# Patient Record
Sex: FEMALE | Race: WHITE | NOT HISPANIC OR LATINO | Employment: FULL TIME | URBAN - METROPOLITAN AREA
[De-identification: names, ages, dates, MRNs, and addresses within clinical notes are randomized per-mention and may not be internally consistent; named-entity substitution may affect disease eponyms.]

---

## 2017-03-15 ENCOUNTER — TRANSCRIBE ORDERS (OUTPATIENT)
Dept: ADMINISTRATIVE | Facility: HOSPITAL | Age: 67
End: 2017-03-15

## 2017-03-15 DIAGNOSIS — R92.8 ABNORMAL MAMMOGRAM: Primary | ICD-10-CM

## 2017-03-22 ENCOUNTER — APPOINTMENT (OUTPATIENT)
Dept: MAMMOGRAPHY | Facility: HOSPITAL | Age: 67
End: 2017-03-22

## 2017-03-23 ENCOUNTER — APPOINTMENT (OUTPATIENT)
Dept: MAMMOGRAPHY | Facility: HOSPITAL | Age: 67
End: 2017-03-23

## 2017-03-31 ENCOUNTER — APPOINTMENT (OUTPATIENT)
Dept: MAMMOGRAPHY | Facility: HOSPITAL | Age: 67
End: 2017-03-31

## 2023-09-12 ENCOUNTER — TELEPHONE (OUTPATIENT)
Dept: GYNECOLOGIC ONCOLOGY | Facility: CLINIC | Age: 73
End: 2023-09-12
Payer: MEDICARE

## 2023-09-12 NOTE — TELEPHONE ENCOUNTER
Caller: Cheryl Storm    Relationship: Self    Best call back number: [phone]    What is the best time to reach you: ASAP    Who are you requesting to speak with (clinical staff, provider,  specific staff member): SCHEDULING     What was the call regarding: PT REQUESTING A CALL BACK TO SEE IF SHE CAN BE ADDED TO DR ALAN'S SURGERY SCHEDULE, SO THINGS WILL BE EXPEDITED AFTER HER VISIT ON 9-29, PLEASE CALL TO ADVISE    Is it okay if the provider responds through MyChart: YES

## 2023-09-29 ENCOUNTER — LAB (OUTPATIENT)
Dept: LAB | Facility: HOSPITAL | Age: 73
End: 2023-09-29
Payer: MEDICARE

## 2023-09-29 ENCOUNTER — TELEPHONE (OUTPATIENT)
Dept: GYNECOLOGIC ONCOLOGY | Facility: CLINIC | Age: 73
End: 2023-09-29

## 2023-09-29 ENCOUNTER — PREP FOR SURGERY (OUTPATIENT)
Dept: OTHER | Facility: HOSPITAL | Age: 73
End: 2023-09-29
Payer: MEDICARE

## 2023-09-29 ENCOUNTER — OFFICE VISIT (OUTPATIENT)
Dept: GYNECOLOGIC ONCOLOGY | Facility: CLINIC | Age: 73
End: 2023-09-29
Payer: MEDICARE

## 2023-09-29 VITALS
HEART RATE: 63 BPM | OXYGEN SATURATION: 98 % | RESPIRATION RATE: 17 BRPM | HEIGHT: 64 IN | WEIGHT: 124 LBS | BODY MASS INDEX: 21.17 KG/M2 | SYSTOLIC BLOOD PRESSURE: 123 MMHG | TEMPERATURE: 97.3 F | DIASTOLIC BLOOD PRESSURE: 75 MMHG

## 2023-09-29 DIAGNOSIS — C54.1 ENDOMETRIAL CANCER: Primary | ICD-10-CM

## 2023-09-29 DIAGNOSIS — Z80.0 FAMILY HISTORY OF PANCREATIC CANCER: ICD-10-CM

## 2023-09-29 DIAGNOSIS — C54.1 ENDOMETRIAL CANCER: ICD-10-CM

## 2023-09-29 LAB
ALBUMIN SERPL-MCNC: 4.5 G/DL (ref 3.5–5.2)
ALBUMIN/GLOB SERPL: 1.7 G/DL
ALP SERPL-CCNC: 71 U/L (ref 39–117)
ALT SERPL W P-5'-P-CCNC: 18 U/L (ref 1–33)
ANION GAP SERPL CALCULATED.3IONS-SCNC: 7 MMOL/L (ref 5–15)
AST SERPL-CCNC: 25 U/L (ref 1–32)
BASOPHILS # BLD AUTO: 0.02 10*3/MM3 (ref 0–0.2)
BASOPHILS NFR BLD AUTO: 0.4 % (ref 0–1.5)
BILIRUB SERPL-MCNC: 0.3 MG/DL (ref 0–1.2)
BUN SERPL-MCNC: 17 MG/DL (ref 8–23)
BUN/CREAT SERPL: 26.2 (ref 7–25)
CALCIUM SPEC-SCNC: 9.6 MG/DL (ref 8.6–10.5)
CANCER AG125 SERPL QL: 116 U/ML (ref 0–38.1)
CHLORIDE SERPL-SCNC: 102 MMOL/L (ref 98–107)
CO2 SERPL-SCNC: 29 MMOL/L (ref 22–29)
CREAT SERPL-MCNC: 0.65 MG/DL (ref 0.57–1)
DEPRECATED RDW RBC AUTO: 48.4 FL (ref 37–54)
EGFRCR SERPLBLD CKD-EPI 2021: 93.7 ML/MIN/1.73
EOSINOPHIL # BLD AUTO: 0.1 10*3/MM3 (ref 0–0.4)
EOSINOPHIL NFR BLD AUTO: 1.9 % (ref 0.3–6.2)
ERYTHROCYTE [DISTWIDTH] IN BLOOD BY AUTOMATED COUNT: 13.7 % (ref 12.3–15.4)
GLOBULIN UR ELPH-MCNC: 2.6 GM/DL
GLUCOSE SERPL-MCNC: 105 MG/DL (ref 65–99)
HCT VFR BLD AUTO: 40.9 % (ref 34–46.6)
HGB BLD-MCNC: 13.6 G/DL (ref 12–15.9)
IMM GRANULOCYTES # BLD AUTO: 0.01 10*3/MM3 (ref 0–0.05)
IMM GRANULOCYTES NFR BLD AUTO: 0.2 % (ref 0–0.5)
LYMPHOCYTES # BLD AUTO: 1.41 10*3/MM3 (ref 0.7–3.1)
LYMPHOCYTES NFR BLD AUTO: 27.2 % (ref 19.6–45.3)
MCH RBC QN AUTO: 31.2 PG (ref 26.6–33)
MCHC RBC AUTO-ENTMCNC: 33.3 G/DL (ref 31.5–35.7)
MCV RBC AUTO: 93.8 FL (ref 79–97)
MONOCYTES # BLD AUTO: 0.52 10*3/MM3 (ref 0.1–0.9)
MONOCYTES NFR BLD AUTO: 10 % (ref 5–12)
NEUTROPHILS NFR BLD AUTO: 3.12 10*3/MM3 (ref 1.7–7)
NEUTROPHILS NFR BLD AUTO: 60.3 % (ref 42.7–76)
PLATELET # BLD AUTO: 273 10*3/MM3 (ref 140–450)
PMV BLD AUTO: 9.5 FL (ref 6–12)
POTASSIUM SERPL-SCNC: 4.8 MMOL/L (ref 3.5–5.2)
PROT SERPL-MCNC: 7.1 G/DL (ref 6–8.5)
RBC # BLD AUTO: 4.36 10*6/MM3 (ref 3.77–5.28)
SODIUM SERPL-SCNC: 138 MMOL/L (ref 136–145)
WBC NRBC COR # BLD: 5.18 10*3/MM3 (ref 3.4–10.8)

## 2023-09-29 PROCEDURE — 80053 COMPREHEN METABOLIC PANEL: CPT

## 2023-09-29 PROCEDURE — 85025 COMPLETE CBC W/AUTO DIFF WBC: CPT

## 2023-09-29 PROCEDURE — 36415 COLL VENOUS BLD VENIPUNCTURE: CPT

## 2023-09-29 PROCEDURE — 86304 IMMUNOASSAY TUMOR CA 125: CPT

## 2023-09-29 RX ORDER — PREGABALIN 150 MG/1
150 CAPSULE ORAL ONCE
OUTPATIENT
Start: 2023-09-29 | End: 2023-09-29

## 2023-09-29 RX ORDER — CEFAZOLIN SODIUM 2 G/100ML
2000 INJECTION, SOLUTION INTRAVENOUS ONCE
OUTPATIENT
Start: 2023-09-29 | End: 2023-09-29

## 2023-09-29 RX ORDER — HEPARIN SODIUM 5000 [USP'U]/ML
5000 INJECTION, SOLUTION INTRAVENOUS; SUBCUTANEOUS ONCE
OUTPATIENT
Start: 2023-09-29 | End: 2023-09-29

## 2023-09-29 RX ORDER — LATANOPROST 50 UG/ML
1 SOLUTION/ DROPS OPHTHALMIC
COMMUNITY
Start: 2023-06-21

## 2023-09-29 RX ORDER — CELECOXIB 200 MG/1
200 CAPSULE ORAL ONCE
OUTPATIENT
Start: 2023-09-29 | End: 2023-09-29

## 2023-09-29 RX ORDER — ACETAMINOPHEN 500 MG
1000 TABLET ORAL ONCE
OUTPATIENT
Start: 2023-09-29 | End: 2023-09-29

## 2023-09-29 RX ORDER — SODIUM CHLORIDE 9 MG/ML
40 INJECTION, SOLUTION INTRAVENOUS AS NEEDED
OUTPATIENT
Start: 2023-09-29

## 2023-09-29 RX ORDER — SCOLOPAMINE TRANSDERMAL SYSTEM 1 MG/1
1 PATCH, EXTENDED RELEASE TRANSDERMAL CONTINUOUS
OUTPATIENT
Start: 2023-09-29 | End: 2023-10-02

## 2023-09-29 RX ORDER — SODIUM CHLORIDE 0.9 % (FLUSH) 0.9 %
10 SYRINGE (ML) INJECTION EVERY 12 HOURS SCHEDULED
OUTPATIENT
Start: 2023-09-29

## 2023-09-29 RX ORDER — SODIUM CHLORIDE 0.9 % (FLUSH) 0.9 %
10 SYRINGE (ML) INJECTION AS NEEDED
OUTPATIENT
Start: 2023-09-29

## 2023-09-29 RX ORDER — SIMVASTATIN 10 MG
TABLET ORAL
COMMUNITY
Start: 2023-09-27

## 2023-09-29 RX ORDER — ESCITALOPRAM OXALATE 20 MG/1
TABLET ORAL
COMMUNITY
Start: 2023-09-05

## 2023-09-29 RX ORDER — VALACYCLOVIR HYDROCHLORIDE 500 MG/1
TABLET, FILM COATED ORAL
COMMUNITY
Start: 2023-09-05

## 2023-09-29 RX ORDER — DORZOLAMIDE HYDROCHLORIDE AND TIMOLOL MALEATE 20; 5 MG/ML; MG/ML
SOLUTION/ DROPS OPHTHALMIC
COMMUNITY
Start: 2023-09-08

## 2023-09-29 NOTE — PROGRESS NOTES
Cheryl Storm  [id number]  1950      Reason for visit: Newly diagnosed serous endometrial cancer    Consultation:  Patient is being seen at the request of Dr. Felicia Botello    History of present illness:  The patient is a 73 y.o. year old female who presents today for treatment and evaluation of the above issues.    Patient was seen 2023 with complaints of postmenopausal bleeding.  She underwent transvaginal ultrasound which showed a thickened endometrial stripe.  There is no free fluid and ovaries were not visualized.  She underwent endometrial biopsy which showed a serous endometrial cancer.  MSI testing was stable, but hormone receptor and HER2/miki testing was not performed.    She presents today for treatment discussion.    Today, patient notes she had vaginal bleeding for a few months, but delayed evaluation due to insurance issues related to her divorce and loss of medical insurance.  She is somewhat anxious and tearful today.  She notes a family history of pancreatic cancer in a father and brother.  She has questions about next steps.  For new patients, [facility] intake form from today was reviewed and confirmed.    OBGYN History:  She is a .  She does not use HRT. She does not have a history of abnormal pap smears, last Pap smear .    Oncologic History:  Oncology/Hematology History    No history exists.         Past Medical History:   Diagnosis Date    Endometrial cancer 2023       History reviewed. No pertinent surgical history.    MEDICATIONS:    Current Outpatient Medications:     dorzolamide-timolol (COSOPT) 22.3-6.8 MG/ML ophthalmic solution, , Disp: , Rfl:     escitalopram (LEXAPRO) 20 MG tablet, , Disp: , Rfl:     latanoprost (XALATAN) 0.005 % ophthalmic solution, Administer 1 drop to both eyes every night at bedtime., Disp: , Rfl:     simvastatin (ZOCOR) 10 MG tablet, , Disp: , Rfl:     timolol (BETIMOL) 0.25 % ophthalmic solution, Apply 1-2 drops to eye(s) as directed by provider  "Daily., Disp: , Rfl:     valACYclovir (VALTREX) 500 MG tablet, , Disp: , Rfl:      Allergies:  has No Known Allergies.    Social History:   Social History     Socioeconomic History    Marital status:        Family History:    Family History   Problem Relation Age of Onset    Breast cancer Neg Hx         no family hx       Health Maintenance:    Health Maintenance   Topic Date Due    DXA SCAN  Never done    COLORECTAL CANCER SCREENING  Never done    TDAP/TD VACCINES (1 - Tdap) Never done    Pneumococcal Vaccine 65+ (1 - PCV) Never done    MAMMOGRAM  08/31/2018    ZOSTER VACCINE (2 of 2) 02/20/2021    INFLUENZA VACCINE  08/01/2023    COVID-19 Vaccine (3 - 2023-24 season) 09/01/2023    HEPATITIS C SCREENING  Never done    ANNUAL WELLNESS VISIT  Never done       Review of Systems:  Please refer to history of present illness.  Review of systems otherwise negative.    Physical Exam:  Vitals:    09/29/23 1000   Resp: 17   Weight: 56.2 kg (124 lb)   Height: 162.6 cm (64\")   PainSc: 0-No pain       Body mass index is 21.28 kg/m².  Wt Readings from Last 3 Encounters:   09/29/23 56.2 kg (124 lb)     GENERAL: Alert, well-appearing female appearing her stated age who is in no apparent distress.   HEENT: Sclera anicteric. Head normocephalic, atraumatic. Mucus membranes moist.   NECK: Trachea midline, supple, without masses.  No thyromegaly.   BREASTS: Deferred  CARDIOVASCULAR: Normal rate, regular rhythm, no murmurs, rubs, or gallops.  No peripheral edema.  RESPIRATORY: Clear to auscultation bilaterally, normal respiratory effort  BACK:  No CVA tenderness, no vertebral tenderness on palpation  GASTROINTESTINAL:  Abdomen is soft, non-tender, non-distended, no rebound or guarding, no masses, or hernias. No HSM.   SKIN:  Warm, dry, well-perfused.  All visible areas intact.  No rashes, lesions, ulcers.  PSYCHIATRIC: AO x3, with appropriate affect, normal thought processes.  NEUROLOGIC: No focal deficits.  Moves extremities " well.  MUSCULOSKELETAL: Normal gait and station.   EXTREMITIES:   No cyanosis, clubbing, symmetric.  LYMPHATICS:  No cervical or inguinal adenopathy noted.     PELVIC exam:    External genitalia are free from lesion. On speculum examination, the cervix was free from lesion. On bimanual examination no mass was appreciated.  Uterus was normal in size and shape. There is no cervical motion or uterine tenderness. No cervical mass was palpated. Parametria were smooth. Rectovaginal exam was deferred.     ECOG PS 0    PROCEDURES: None    Diagnostic Data:    No Images in the past 120 days found..    No results found for: WBC, HGB, HCT, MCV, PLT, NEUTROABS, GLUCOSE, BUN, CREATININE, EGFRIFNONA, EGFRIFAFRI, NA, K, CL, CO2, MG, PHOS, CALCIUM, ALBUMIN, AST, ALT, BILITOT  No results found for:       Assessment & Plan   This is a 73 y.o. woman with newly diagnosed serous endometrial cancer on biopsy.  Encounter Diagnosis   Name Primary?    Endometrial cancer Yes     This high risk histology cancer was discussed.  Imaging and  were ordered.  We discussed surgery as a first step toward treatment although patient was advised of the high likelihood that she would need additional treatment.    Patient was consented for injection of ICG dye, robotic assisted total laparoscopic hysterectomy, bilateral salpingo-oophorectomy, sentinel/completion lymph node dissection, omentectomy.      Risks and benefits of surgery were discussed.  This included, but was not limited to, infection and bleeding like when the skin is cut; damage to surrounding structures; and incisional complications.  Risk of DVT was addressed for major surgeries.  Standard of care efforts to minimize these risks were reviewed.  Typical hospital stay and recovery were discussed as well as post-procedure precautions.  Surgical implications of chronic illnesses on recovery and surgical outcome were reviewed.   Pain medication regimen for postoperative care was  discussed.  Typical regimen and avoidance of narcotics was discussed.  Patient was educated that other factors, such as existing narcotic use, can impact postoperative pain management.    Risks and benefits of lymph node dissection were further discussed.  This included lymphocyst, hematoma, lymphedema, vascular injury, and nerve injury.  Patient verbalized understanding of the plan including the risks and benefits.  Appropriate perioperative testing including laboratory evaluation, EKG as clinically indicated, chest x-ray as clinically indicated, and preadmission evaluation were all ordered as a part of this patient's care.    Due to patient's family history of pancreatic cancer and personal history of cancer, referral was made to genetic counseling.  This is in keeping with guidelines.  Ajay testing was ordered.    Pain assessment was performed today as a part of patient’s care.  For patients with pain related to surgery, gynecologic malignancy or cancer treatment, the plan is as noted in the assessment/plan.  For patients with pain not related to these issues, they are to seek any further needed care from a more appropriate provider, such as PCP.      Orders Placed This Encounter   Procedures    CT Chest With Contrast     Standing Status:   Future     Standing Expiration Date:   9/29/2024     Order Specific Question:   Release to patient     Answer:   Routine Release [[id number]]    CT Abdomen Pelvis With Contrast     Standing Status:   Future     Standing Expiration Date:   9/28/2024     Order Specific Question:   Will Oral Contrast be needed for this procedure?     Answer:   Yes    SCANNED - LABS         Standing Status:   Future     Standing Expiration Date:   9/29/2024     Order Specific Question:   Release to patient     Answer:   Routine Release [[id number]]       FOLLOW UP: surgery    I spent 60 minutes caring for Cheryl on this date of service. This time includes time spent by me in the following  activities: preparing for the visit, reviewing tests, performing a medically appropriate examination and/or evaluation, counseling and educating the patient/family/caregiver, ordering medications, tests, or procedures, referring and communicating with other health care professionals, documenting information in the medical record, and care coordination    Electronically Signed by: Latasha Farr MD  Date: 9/29/2023

## 2023-09-29 NOTE — H&P (VIEW-ONLY)
Cheryl Storm  [id number]  1950      Reason for visit: Newly diagnosed serous endometrial cancer    Consultation:  Patient is being seen at the request of Dr. Felicia Botello    History of present illness:  The patient is a 73 y.o. year old female who presents today for treatment and evaluation of the above issues.    Patient was seen 2023 with complaints of postmenopausal bleeding.  She underwent transvaginal ultrasound which showed a thickened endometrial stripe.  There is no free fluid and ovaries were not visualized.  She underwent endometrial biopsy which showed a serous endometrial cancer.  MSI testing was stable, but hormone receptor and HER2/miki testing was not performed.    She presents today for treatment discussion.    Today, patient notes she had vaginal bleeding for a few months, but delayed evaluation due to insurance issues related to her divorce and loss of medical insurance.  She is somewhat anxious and tearful today.  She notes a family history of pancreatic cancer in a father and brother.  She has questions about next steps.  For new patients, [facility] intake form from today was reviewed and confirmed.    OBGYN History:  She is a .  She does not use HRT. She does not have a history of abnormal pap smears, last Pap smear .    Oncologic History:  Oncology/Hematology History    No history exists.         Past Medical History:   Diagnosis Date    Endometrial cancer 2023       History reviewed. No pertinent surgical history.    MEDICATIONS:    Current Outpatient Medications:     dorzolamide-timolol (COSOPT) 22.3-6.8 MG/ML ophthalmic solution, , Disp: , Rfl:     escitalopram (LEXAPRO) 20 MG tablet, , Disp: , Rfl:     latanoprost (XALATAN) 0.005 % ophthalmic solution, Administer 1 drop to both eyes every night at bedtime., Disp: , Rfl:     simvastatin (ZOCOR) 10 MG tablet, , Disp: , Rfl:     timolol (BETIMOL) 0.25 % ophthalmic solution, Apply 1-2 drops to eye(s) as directed by provider  "Daily., Disp: , Rfl:     valACYclovir (VALTREX) 500 MG tablet, , Disp: , Rfl:      Allergies:  has No Known Allergies.    Social History:   Social History     Socioeconomic History    Marital status:        Family History:    Family History   Problem Relation Age of Onset    Breast cancer Neg Hx         no family hx       Health Maintenance:    Health Maintenance   Topic Date Due    DXA SCAN  Never done    COLORECTAL CANCER SCREENING  Never done    TDAP/TD VACCINES (1 - Tdap) Never done    Pneumococcal Vaccine 65+ (1 - PCV) Never done    MAMMOGRAM  08/31/2018    ZOSTER VACCINE (2 of 2) 02/20/2021    INFLUENZA VACCINE  08/01/2023    COVID-19 Vaccine (3 - 2023-24 season) 09/01/2023    HEPATITIS C SCREENING  Never done    ANNUAL WELLNESS VISIT  Never done       Review of Systems:  Please refer to history of present illness.  Review of systems otherwise negative.    Physical Exam:  Vitals:    09/29/23 1000   Resp: 17   Weight: 56.2 kg (124 lb)   Height: 162.6 cm (64\")   PainSc: 0-No pain       Body mass index is 21.28 kg/m².  Wt Readings from Last 3 Encounters:   09/29/23 56.2 kg (124 lb)     GENERAL: Alert, well-appearing female appearing her stated age who is in no apparent distress.   HEENT: Sclera anicteric. Head normocephalic, atraumatic. Mucus membranes moist.   NECK: Trachea midline, supple, without masses.  No thyromegaly.   BREASTS: Deferred  CARDIOVASCULAR: Normal rate, regular rhythm, no murmurs, rubs, or gallops.  No peripheral edema.  RESPIRATORY: Clear to auscultation bilaterally, normal respiratory effort  BACK:  No CVA tenderness, no vertebral tenderness on palpation  GASTROINTESTINAL:  Abdomen is soft, non-tender, non-distended, no rebound or guarding, no masses, or hernias. No HSM.   SKIN:  Warm, dry, well-perfused.  All visible areas intact.  No rashes, lesions, ulcers.  PSYCHIATRIC: AO x3, with appropriate affect, normal thought processes.  NEUROLOGIC: No focal deficits.  Moves extremities " well.  MUSCULOSKELETAL: Normal gait and station.   EXTREMITIES:   No cyanosis, clubbing, symmetric.  LYMPHATICS:  No cervical or inguinal adenopathy noted.     PELVIC exam:    External genitalia are free from lesion. On speculum examination, the cervix was free from lesion. On bimanual examination no mass was appreciated.  Uterus was normal in size and shape. There is no cervical motion or uterine tenderness. No cervical mass was palpated. Parametria were smooth. Rectovaginal exam was deferred.     ECOG PS 0    PROCEDURES: None    Diagnostic Data:    No Images in the past 120 days found..    No results found for: WBC, HGB, HCT, MCV, PLT, NEUTROABS, GLUCOSE, BUN, CREATININE, EGFRIFNONA, EGFRIFAFRI, NA, K, CL, CO2, MG, PHOS, CALCIUM, ALBUMIN, AST, ALT, BILITOT  No results found for:       Assessment & Plan   This is a 73 y.o. woman with newly diagnosed serous endometrial cancer on biopsy.  Encounter Diagnosis   Name Primary?    Endometrial cancer Yes     This high risk histology cancer was discussed.  Imaging and  were ordered.  We discussed surgery as a first step toward treatment although patient was advised of the high likelihood that she would need additional treatment.    Patient was consented for injection of ICG dye, robotic assisted total laparoscopic hysterectomy, bilateral salpingo-oophorectomy, sentinel/completion lymph node dissection, omentectomy.      Risks and benefits of surgery were discussed.  This included, but was not limited to, infection and bleeding like when the skin is cut; damage to surrounding structures; and incisional complications.  Risk of DVT was addressed for major surgeries.  Standard of care efforts to minimize these risks were reviewed.  Typical hospital stay and recovery were discussed as well as post-procedure precautions.  Surgical implications of chronic illnesses on recovery and surgical outcome were reviewed.   Pain medication regimen for postoperative care was  discussed.  Typical regimen and avoidance of narcotics was discussed.  Patient was educated that other factors, such as existing narcotic use, can impact postoperative pain management.    Risks and benefits of lymph node dissection were further discussed.  This included lymphocyst, hematoma, lymphedema, vascular injury, and nerve injury.  Patient verbalized understanding of the plan including the risks and benefits.  Appropriate perioperative testing including laboratory evaluation, EKG as clinically indicated, chest x-ray as clinically indicated, and preadmission evaluation were all ordered as a part of this patient's care.    Due to patient's family history of pancreatic cancer and personal history of cancer, referral was made to genetic counseling.  This is in keeping with guidelines.  Ajay testing was ordered.    Pain assessment was performed today as a part of patient’s care.  For patients with pain related to surgery, gynecologic malignancy or cancer treatment, the plan is as noted in the assessment/plan.  For patients with pain not related to these issues, they are to seek any further needed care from a more appropriate provider, such as PCP.      Orders Placed This Encounter   Procedures    CT Chest With Contrast     Standing Status:   Future     Standing Expiration Date:   9/29/2024     Order Specific Question:   Release to patient     Answer:   Routine Release [[id number]]    CT Abdomen Pelvis With Contrast     Standing Status:   Future     Standing Expiration Date:   9/28/2024     Order Specific Question:   Will Oral Contrast be needed for this procedure?     Answer:   Yes    SCANNED - LABS         Standing Status:   Future     Standing Expiration Date:   9/29/2024     Order Specific Question:   Release to patient     Answer:   Routine Release [[id number]]       FOLLOW UP: surgery    I spent 60 minutes caring for Cheryl on this date of service. This time includes time spent by me in the following  activities: preparing for the visit, reviewing tests, performing a medically appropriate examination and/or evaluation, counseling and educating the patient/family/caregiver, ordering medications, tests, or procedures, referring and communicating with other health care professionals, documenting information in the medical record, and care coordination    Electronically Signed by: Latasha Farr MD  Date: 9/29/2023

## 2023-09-29 NOTE — PATIENT INSTRUCTIONS
"Surgery Instructions            Cheryl Storm  [id number]  1950      SURGEON:  Latasha Farr MD    Surgery Coordinator:  CHANA CASTREJON    If you have any questions before or after surgery please call.  [phone]        Appointment        1.  Your surgery has been scheduled on 10/16/2023 .  You will need to go to Main Registration to check in at 7:00 AM . They will then send up to the [address] [place] [address] second floor surgery area.    The Day(s) Before Surgery      Nothing by mouth after midnight on 10/15/2023.    Plan to have someone take you home from the hospital.    If you are taking any weight loss medications please let our staff now. Ideally they will need to be held 2 weeks prior to your procedure.    Do not use any products that contain nicotine or tobacco, such as cigarettes and e-cigarettes. These can delay healing after surgery. If you need help quitting, ask your health care provider       Do not take vitamins or aspirin one week before surgery ( if applicable). Do not take Ibuprofen or NSAIDs 5 days prior to Surgery. Do not take ACE or ARB medications for blood pressure the morning of surgery. If you are taking insulin, take 1/2 dose insulin the night prior to surgery.   Bring them with you to the hospital (Diabetic patient should bring insulin if instructed by the managing physician). If you are taking a blood thinner ( Eliquis, Coumadin, Xarelto, Lovenox, Asprin, Heparin, etc.) please have the provider that manages this instruct you on when to stop taking prior to surgery.     Continue antidepressants, Beta Blockers \"olol\", anti-seizure medication, GERD medication (heartburn), Opioids and Parkinson's medication.       If you are feeling sick, have a fever or cough and have seen your PCP let our office know 48 hours prior to surgery. It may be subject to rescheduling.           The Day of Surgery:    Do not chew gum or tobacco, smoke, or eat mints or hard candy. Shower and wash your hair. You may brush " your teeth but  do not swallow water. Use any wipes that Pre-admission testing has given you.     Please arrive for surgery as instructed by the pre-op nurse, often one to two hours before your surgery.     Remove all jewelry, including rings and piercings. Do not bring valuables to the hospital.     Wear loose-fitting clothing.     Avoid wearing eye makeup or contact lenses     Please remember to bring:  ? Photo ID and medical insurance card  ? Advanced directives, living will or power of  (if applicable)  ? Current list of all medications, including over-the- counter and herbal supplements  ? List of allergies  ? CPAP device if you have sleep apnea  ? Any assistive devices or equipment needed after surgery  ? Books/magazines to pass the time       Once you are called to go to your pre-op room, no one will be allowed in the pre op room.     Please note no one under age 12 is permitted to stay in the waiting area without supervision.    We make every effort to begin surgery at your scheduled start time but delays do occur. We will keep you and your family  updated about any delays.    While You are In the Pre-Op Room:   The nurse will review your health history and will place an IV (into the vein) in your hand or arm for fluids and  medicines.   An anesthesia provider will talk with you about anesthesia and pain control during and after surgery.   A member of the surgical team can answer your questions.             What can I expect after the procedure?    After Surgery and/or Discharge:    After surgery you will be moved to the recovery area. Recovery and discharge times will vary depending on the procedure.    The recovery room nurse will provide your family/visitors with updates. Visitors are not permitted in the immediate postoperative recovery area, but your visitors will be notified when they can come to see you after surgery.    If you will not be admitted to the hospital, your nurse will assess your  readiness to go home. This includes your:     Ability to walk, use crutches, etc.   Ability to urinate adequate amounts   Nausea and pain control    Before discharge, you will receive written instructions about how to care for yourself at home along with any prescriptions  for medications.     For your safety, you will need a responsible adult age 18 or older to accompany you home.     Please have a ride arranged and available when you are ready to leave. You cannot leave alone and it is recommended someone stay with you for the first 24 hours after anesthesia.       After the procedure, it is common to have:    Pain.  Soreness and numbness in your incision areas.  Constipation.  Temporary change in bladder function.  Feelings of sadness or other emotions.  Small amounts of clear drainage from incisions  If you are discharged with an abdominal binder, this is to be used as needed for incisional comfort. You may choose to discontinue use at any time.           Follow these instructions at home:  Medicines    Please take any medications that have been prescribed after your surgery, you may take over the counter Tylenol and Ibuprofen, unless told otherwise by your provider.   Please take your stool softener as prescribed. If you do not have a bowel movement within 24 hours following surgery try a laxative (milk of magnesia) ( Senna-Denice)  or you may take Natalie-Lax.    Activity    Return to your normal activities as told by your health care provider.   You may be told to take short walks every day and go farther each time.  Do not lift anything that is heavier than 20 lbs.      General instructions    Do not put anything in your vagina for 6 weeks after your surgery or as told by your health care provider. This includes tampons and douches.  Do not have sex until your health care provider says you can.  Do not take baths, swim, or use a hot tub until your health care provider approves.  Drink enough fluid to keep your  urine clear or pale yellow.  Do not drive for 24 hours if you were given a sedative.  Do not drive while taking Narcotic Pain medication ( oxycodone, hydrocodone, etc.).   Keep all follow-up visits as told by your health care provider. This is important. You will have a post op appointment typically 3 weeks after surgery.  Make sure you are urinating on a scheduled basis, for example every 2 hours. This will help retrain your bladder after surgery and prevent urinary tract infections.     Contact a health care provider if:    Your pain medicine is not helping.  You have a fever over 101.0  You have redness, swelling, or pain at your incision site.  You continue to have difficulty urinating.  You have not had a bowel movement 2-3 days after surgery, experience nausea and vomiting, are unable to pass gas.   Large volumes of drainage or blood from incisions, requiring multiple guaze changes.     Get help right away if:    You have severe abdominal or back pain that is not controlled with medication.      If you experience a medical emergency call 911 or have someone drive you to your nearest emergency department.         Parking Information:    Free parking is available for patients and guests. There is  parking at the [facility] Building in front of the hospital. Parking is also available in the [facility] and [facility].         Financial Assistance:    If you have any questions or need assistance, contact your [facility] financial counseling office from 8:30 a.m.-4:30  p.m. Monday through Friday. Closed weekends.     [place]: [phone] or, or visit at [address] [facility], [address], near the entrance.        Patient Payments and Correspondence    Customer service representatives are available to assist you from 8:00 a.m. to 6:00 p.m. Eastern Standard Time by calling [phone] Monday through Friday. You can also contact us through [employer].    [facility]  [id number]  [place]  [address]  [phone]

## 2023-09-29 NOTE — TELEPHONE ENCOUNTER
I called pt to discuss ca 125 result.  Left voice message that her tumor marker is elevated and is glad that we are getting CAT scans.  Electronically signed by Latasha Farr MD, 09/29/23, 4:11 PM EDT.

## 2023-10-11 ENCOUNTER — PREP FOR SURGERY (OUTPATIENT)
Dept: OTHER | Facility: HOSPITAL | Age: 73
End: 2023-10-11
Payer: MEDICARE

## 2023-10-12 ENCOUNTER — HOSPITAL ENCOUNTER (OUTPATIENT)
Dept: CT IMAGING | Facility: HOSPITAL | Age: 73
Discharge: HOME | End: 2023-10-12
Admitting: OBSTETRICS & GYNECOLOGY
Payer: MEDICARE

## 2023-10-12 ENCOUNTER — APPOINTMENT (OUTPATIENT)
Dept: CT IMAGING | Facility: HOSPITAL | Age: 73
End: 2023-10-12
Payer: MEDICARE

## 2023-10-12 ENCOUNTER — TELEPHONE (OUTPATIENT)
Dept: GYNECOLOGIC ONCOLOGY | Facility: CLINIC | Age: 73
End: 2023-10-12
Payer: MEDICARE

## 2023-10-12 DIAGNOSIS — C54.1 ENDOMETRIAL CANCER: ICD-10-CM

## 2023-10-12 PROCEDURE — 74177 CT ABD & PELVIS W/CONTRAST: CPT

## 2023-10-12 PROCEDURE — 25510000001 IOPAMIDOL 61 % SOLUTION: Performed by: OBSTETRICS & GYNECOLOGY

## 2023-10-12 PROCEDURE — 71260 CT THORAX DX C+: CPT

## 2023-10-12 RX ADMIN — IOPAMIDOL 85 ML: 612 INJECTION, SOLUTION INTRAVENOUS at 09:27

## 2023-10-13 ENCOUNTER — TELEPHONE (OUTPATIENT)
Dept: GYNECOLOGIC ONCOLOGY | Facility: CLINIC | Age: 73
End: 2023-10-13
Payer: MEDICARE

## 2023-10-13 NOTE — TELEPHONE ENCOUNTER
I called patient re: elevated ca 125, CT findings.    She understands that there could be ovarian involvement, but will need to wait for final pathology to make decisions and get stage.  She V/U  Electronically signed by Latasha Farr MD, 10/12/23, 8:43 PM EDT.

## 2023-10-16 ENCOUNTER — HOSPITAL ENCOUNTER (INPATIENT)
Facility: HOSPITAL | Age: 73
LOS: 1 days | Discharge: HOME | End: 2023-10-16
Attending: OBSTETRICS & GYNECOLOGY | Admitting: OBSTETRICS & GYNECOLOGY
Payer: MEDICARE

## 2023-10-16 ENCOUNTER — ANESTHESIA (OUTPATIENT)
Dept: PERIOP | Facility: HOSPITAL | Age: 73
End: 2023-10-16
Payer: MEDICARE

## 2023-10-16 ENCOUNTER — ANESTHESIA EVENT (OUTPATIENT)
Dept: PERIOP | Facility: HOSPITAL | Age: 73
End: 2023-10-16
Payer: MEDICARE

## 2023-10-16 VITALS
BODY MASS INDEX: 21.17 KG/M2 | OXYGEN SATURATION: 97 % | DIASTOLIC BLOOD PRESSURE: 89 MMHG | TEMPERATURE: 97 F | RESPIRATION RATE: 18 BRPM | HEART RATE: 60 BPM | SYSTOLIC BLOOD PRESSURE: 161 MMHG | WEIGHT: 124 LBS | HEIGHT: 64 IN

## 2023-10-16 DIAGNOSIS — C54.1 ENDOMETRIAL CANCER: ICD-10-CM

## 2023-10-16 PROCEDURE — 0UT74ZZ RESECTION OF BILATERAL FALLOPIAN TUBES, PERCUTANEOUS ENDOSCOPIC APPROACH: ICD-10-PCS | Performed by: OBSTETRICS & GYNECOLOGY

## 2023-10-16 PROCEDURE — 0UT24ZZ RESECTION OF BILATERAL OVARIES, PERCUTANEOUS ENDOSCOPIC APPROACH: ICD-10-PCS | Performed by: OBSTETRICS & GYNECOLOGY

## 2023-10-16 PROCEDURE — 25810000003 LACTATED RINGERS PER 1000 ML: Performed by: ANESTHESIOLOGY

## 2023-10-16 PROCEDURE — 58571 TLH W/T/O 250 G OR LESS: CPT | Performed by: OBSTETRICS & GYNECOLOGY

## 2023-10-16 PROCEDURE — 25010000002 INDOCYANINE GREEN 25 MG RECONSTITUTED SOLUTION: Performed by: OBSTETRICS & GYNECOLOGY

## 2023-10-16 PROCEDURE — 25810000003 SODIUM CHLORIDE PER 500 ML: Performed by: OBSTETRICS & GYNECOLOGY

## 2023-10-16 PROCEDURE — 88342 IMHCHEM/IMCYTCHM 1ST ANTB: CPT | Performed by: OBSTETRICS & GYNECOLOGY

## 2023-10-16 PROCEDURE — 0TN74ZZ RELEASE LEFT URETER, PERCUTANEOUS ENDOSCOPIC APPROACH: ICD-10-PCS | Performed by: OBSTETRICS & GYNECOLOGY

## 2023-10-16 PROCEDURE — 25010000002 PROPOFOL 10 MG/ML EMULSION: Performed by: ANESTHESIOLOGY

## 2023-10-16 PROCEDURE — 25010000002 SUGAMMADEX 200 MG/2ML SOLUTION: Performed by: ANESTHESIOLOGY

## 2023-10-16 PROCEDURE — 38571 LAPAROSCOPY LYMPHADENECTOMY: CPT | Performed by: OBSTETRICS & GYNECOLOGY

## 2023-10-16 PROCEDURE — 38900 IO MAP OF SENT LYMPH NODE: CPT | Performed by: OBSTETRICS & GYNECOLOGY

## 2023-10-16 PROCEDURE — 88360 TUMOR IMMUNOHISTOCHEM/MANUAL: CPT | Performed by: OBSTETRICS & GYNECOLOGY

## 2023-10-16 PROCEDURE — 25010000002 FENTANYL CITRATE (PF) 50 MCG/ML SOLUTION

## 2023-10-16 PROCEDURE — 25010000002 ONDANSETRON PER 1 MG: Performed by: ANESTHESIOLOGY

## 2023-10-16 PROCEDURE — 38571 LAPAROSCOPY LYMPHADENECTOMY: CPT | Performed by: PHYSICIAN ASSISTANT

## 2023-10-16 PROCEDURE — 58571 TLH W/T/O 250 G OR LESS: CPT | Performed by: PHYSICIAN ASSISTANT

## 2023-10-16 PROCEDURE — 0UT94ZZ RESECTION OF UTERUS, PERCUTANEOUS ENDOSCOPIC APPROACH: ICD-10-PCS | Performed by: OBSTETRICS & GYNECOLOGY

## 2023-10-16 PROCEDURE — 88341 IMHCHEM/IMCYTCHM EA ADD ANTB: CPT | Performed by: OBSTETRICS & GYNECOLOGY

## 2023-10-16 PROCEDURE — 25010000002 CEFAZOLIN IN DEXTROSE 2000 MG/ 100 ML SOLUTION: Performed by: OBSTETRICS & GYNECOLOGY

## 2023-10-16 PROCEDURE — 8E0W4CZ ROBOTIC ASSISTED PROCEDURE OF TRUNK REGION, PERCUTANEOUS ENDOSCOPIC APPROACH: ICD-10-PCS | Performed by: OBSTETRICS & GYNECOLOGY

## 2023-10-16 PROCEDURE — 88307 TISSUE EXAM BY PATHOLOGIST: CPT | Performed by: OBSTETRICS & GYNECOLOGY

## 2023-10-16 PROCEDURE — 07BC4ZX EXCISION OF PELVIS LYMPHATIC, PERCUTANEOUS ENDOSCOPIC APPROACH, DIAGNOSTIC: ICD-10-PCS | Performed by: OBSTETRICS & GYNECOLOGY

## 2023-10-16 PROCEDURE — 88305 TISSUE EXAM BY PATHOLOGIST: CPT | Performed by: OBSTETRICS & GYNECOLOGY

## 2023-10-16 PROCEDURE — 25010000002 HYDROMORPHONE 1 MG/ML SOLUTION

## 2023-10-16 PROCEDURE — 38900 IO MAP OF SENT LYMPH NODE: CPT | Performed by: PHYSICIAN ASSISTANT

## 2023-10-16 PROCEDURE — 88309 TISSUE EXAM BY PATHOLOGIST: CPT | Performed by: OBSTETRICS & GYNECOLOGY

## 2023-10-16 PROCEDURE — 0DBU4ZZ EXCISION OF OMENTUM, PERCUTANEOUS ENDOSCOPIC APPROACH: ICD-10-PCS | Performed by: OBSTETRICS & GYNECOLOGY

## 2023-10-16 PROCEDURE — 88331 PATH CONSLTJ SURG 1 BLK 1SPC: CPT | Performed by: OBSTETRICS & GYNECOLOGY

## 2023-10-16 PROCEDURE — 25010000002 FENTANYL CITRATE (PF) 100 MCG/2ML SOLUTION: Performed by: ANESTHESIOLOGY

## 2023-10-16 RX ORDER — PROPOFOL 10 MG/ML
VIAL (ML) INTRAVENOUS AS NEEDED
Status: DISCONTINUED | OUTPATIENT
Start: 2023-10-16 | End: 2023-10-16 | Stop reason: SURG

## 2023-10-16 RX ORDER — PSEUDOEPHEDRINE HCL 30 MG
200 TABLET ORAL 2 TIMES DAILY PRN
Qty: 90 CAPSULE | Refills: 3 | Status: SHIPPED | OUTPATIENT
Start: 2023-10-16

## 2023-10-16 RX ORDER — BUPIVACAINE HYDROCHLORIDE AND EPINEPHRINE 5; 5 MG/ML; UG/ML
INJECTION, SOLUTION PERINEURAL AS NEEDED
Status: DISCONTINUED | OUTPATIENT
Start: 2023-10-16 | End: 2023-10-16 | Stop reason: HOSPADM

## 2023-10-16 RX ORDER — ONDANSETRON 4 MG/1
4 TABLET, FILM COATED ORAL EVERY 6 HOURS PRN
Qty: 5 TABLET | Refills: 0 | Status: SHIPPED | OUTPATIENT
Start: 2023-10-16

## 2023-10-16 RX ORDER — FENTANYL CITRATE 50 UG/ML
INJECTION, SOLUTION INTRAMUSCULAR; INTRAVENOUS AS NEEDED
Status: DISCONTINUED | OUTPATIENT
Start: 2023-10-16 | End: 2023-10-16 | Stop reason: SURG

## 2023-10-16 RX ORDER — PREGABALIN 150 MG/1
150 CAPSULE ORAL ONCE
Status: COMPLETED | OUTPATIENT
Start: 2023-10-16 | End: 2023-10-16

## 2023-10-16 RX ORDER — SODIUM CHLORIDE 0.9 % (FLUSH) 0.9 %
10 SYRINGE (ML) INJECTION AS NEEDED
Status: CANCELLED | OUTPATIENT
Start: 2023-10-16

## 2023-10-16 RX ORDER — ROCURONIUM BROMIDE 10 MG/ML
INJECTION, SOLUTION INTRAVENOUS AS NEEDED
Status: DISCONTINUED | OUTPATIENT
Start: 2023-10-16 | End: 2023-10-16 | Stop reason: SURG

## 2023-10-16 RX ORDER — OXYCODONE HYDROCHLORIDE 5 MG/1
5 TABLET ORAL EVERY 6 HOURS PRN
Status: DISCONTINUED | OUTPATIENT
Start: 2023-10-16 | End: 2023-10-16 | Stop reason: HOSPADM

## 2023-10-16 RX ORDER — MAGNESIUM HYDROXIDE 1200 MG/15ML
LIQUID ORAL AS NEEDED
Status: DISCONTINUED | OUTPATIENT
Start: 2023-10-16 | End: 2023-10-16 | Stop reason: HOSPADM

## 2023-10-16 RX ORDER — INDOCYANINE GREEN AND WATER 25 MG
KIT INJECTION AS NEEDED
Status: DISCONTINUED | OUTPATIENT
Start: 2023-10-16 | End: 2023-10-16 | Stop reason: HOSPADM

## 2023-10-16 RX ORDER — SCOLOPAMINE TRANSDERMAL SYSTEM 1 MG/1
1 PATCH, EXTENDED RELEASE TRANSDERMAL CONTINUOUS
Status: DISCONTINUED | OUTPATIENT
Start: 2023-10-16 | End: 2023-10-16 | Stop reason: HOSPADM

## 2023-10-16 RX ORDER — LIDOCAINE HYDROCHLORIDE 10 MG/ML
0.5 INJECTION, SOLUTION EPIDURAL; INFILTRATION; INTRACAUDAL; PERINEURAL ONCE AS NEEDED
Status: COMPLETED | OUTPATIENT
Start: 2023-10-16 | End: 2023-10-16

## 2023-10-16 RX ORDER — DOCUSATE SODIUM 100 MG/1
200 CAPSULE, LIQUID FILLED ORAL 2 TIMES DAILY PRN
Status: DISCONTINUED | OUTPATIENT
Start: 2023-10-16 | End: 2023-10-16 | Stop reason: HOSPADM

## 2023-10-16 RX ORDER — OXYCODONE HYDROCHLORIDE 5 MG/1
5 TABLET ORAL EVERY 6 HOURS PRN
Qty: 5 TABLET | Refills: 0 | Status: SHIPPED | OUTPATIENT
Start: 2023-10-16

## 2023-10-16 RX ORDER — FAMOTIDINE 20 MG/1
20 TABLET, FILM COATED ORAL ONCE
Status: COMPLETED | OUTPATIENT
Start: 2023-10-16 | End: 2023-10-16

## 2023-10-16 RX ORDER — LIDOCAINE HYDROCHLORIDE 10 MG/ML
INJECTION, SOLUTION EPIDURAL; INFILTRATION; INTRACAUDAL; PERINEURAL AS NEEDED
Status: DISCONTINUED | OUTPATIENT
Start: 2023-10-16 | End: 2023-10-16 | Stop reason: SURG

## 2023-10-16 RX ORDER — SODIUM CHLORIDE, SODIUM LACTATE, POTASSIUM CHLORIDE, CALCIUM CHLORIDE 600; 310; 30; 20 MG/100ML; MG/100ML; MG/100ML; MG/100ML
9 INJECTION, SOLUTION INTRAVENOUS CONTINUOUS
Status: DISCONTINUED | OUTPATIENT
Start: 2023-10-16 | End: 2023-10-16 | Stop reason: HOSPADM

## 2023-10-16 RX ORDER — FAMOTIDINE 10 MG/ML
20 INJECTION, SOLUTION INTRAVENOUS ONCE
Status: CANCELLED | OUTPATIENT
Start: 2023-10-16 | End: 2023-10-16

## 2023-10-16 RX ORDER — CELECOXIB 200 MG/1
200 CAPSULE ORAL ONCE
Status: COMPLETED | OUTPATIENT
Start: 2023-10-16 | End: 2023-10-16

## 2023-10-16 RX ORDER — FENTANYL CITRATE 50 UG/ML
INJECTION, SOLUTION INTRAMUSCULAR; INTRAVENOUS
Status: COMPLETED
Start: 2023-10-16 | End: 2023-10-16

## 2023-10-16 RX ORDER — ONDANSETRON 4 MG/1
4 TABLET, FILM COATED ORAL EVERY 6 HOURS PRN
Status: DISCONTINUED | OUTPATIENT
Start: 2023-10-16 | End: 2023-10-16 | Stop reason: HOSPADM

## 2023-10-16 RX ORDER — SODIUM CHLORIDE 9 MG/ML
INJECTION, SOLUTION INTRAVENOUS AS NEEDED
Status: DISCONTINUED | OUTPATIENT
Start: 2023-10-16 | End: 2023-10-16 | Stop reason: HOSPADM

## 2023-10-16 RX ORDER — SODIUM CHLORIDE 0.9 % (FLUSH) 0.9 %
10 SYRINGE (ML) INJECTION EVERY 12 HOURS SCHEDULED
Status: CANCELLED | OUTPATIENT
Start: 2023-10-16

## 2023-10-16 RX ORDER — IBUPROFEN 600 MG/1
600 TABLET ORAL EVERY 6 HOURS PRN
Qty: 30 TABLET | Refills: 0 | Status: SHIPPED | OUTPATIENT
Start: 2023-10-16

## 2023-10-16 RX ORDER — CEFAZOLIN SODIUM 2 G/100ML
2000 INJECTION, SOLUTION INTRAVENOUS ONCE
Status: COMPLETED | OUTPATIENT
Start: 2023-10-16 | End: 2023-10-16

## 2023-10-16 RX ORDER — SODIUM CHLORIDE 9 MG/ML
40 INJECTION, SOLUTION INTRAVENOUS AS NEEDED
Status: CANCELLED | OUTPATIENT
Start: 2023-10-16

## 2023-10-16 RX ORDER — HYDROMORPHONE HYDROCHLORIDE 1 MG/ML
0.5 INJECTION, SOLUTION INTRAMUSCULAR; INTRAVENOUS; SUBCUTANEOUS
Status: DISCONTINUED | OUTPATIENT
Start: 2023-10-16 | End: 2023-10-16 | Stop reason: HOSPADM

## 2023-10-16 RX ORDER — ACETAMINOPHEN 325 MG/1
650 TABLET ORAL EVERY 6 HOURS PRN
Status: DISCONTINUED | OUTPATIENT
Start: 2023-10-16 | End: 2023-10-16 | Stop reason: HOSPADM

## 2023-10-16 RX ORDER — HEPARIN SODIUM 5000 [USP'U]/ML
5000 INJECTION, SOLUTION INTRAVENOUS; SUBCUTANEOUS ONCE
Status: DISCONTINUED | OUTPATIENT
Start: 2023-10-16 | End: 2023-10-16 | Stop reason: HOSPADM

## 2023-10-16 RX ORDER — FENTANYL CITRATE 50 UG/ML
50 INJECTION, SOLUTION INTRAMUSCULAR; INTRAVENOUS
Status: DISCONTINUED | OUTPATIENT
Start: 2023-10-16 | End: 2023-10-16 | Stop reason: HOSPADM

## 2023-10-16 RX ORDER — MIDAZOLAM HYDROCHLORIDE 1 MG/ML
0.5 INJECTION INTRAMUSCULAR; INTRAVENOUS
Status: DISCONTINUED | OUTPATIENT
Start: 2023-10-16 | End: 2023-10-16 | Stop reason: HOSPADM

## 2023-10-16 RX ORDER — ACETAMINOPHEN 500 MG
1000 TABLET ORAL ONCE
Status: COMPLETED | OUTPATIENT
Start: 2023-10-16 | End: 2023-10-16

## 2023-10-16 RX ORDER — ACETAMINOPHEN 325 MG/1
650 TABLET ORAL EVERY 6 HOURS PRN
Qty: 60 TABLET | Refills: 0 | Status: SHIPPED | OUTPATIENT
Start: 2023-10-16

## 2023-10-16 RX ORDER — ONDANSETRON 2 MG/ML
INJECTION INTRAMUSCULAR; INTRAVENOUS AS NEEDED
Status: DISCONTINUED | OUTPATIENT
Start: 2023-10-16 | End: 2023-10-16 | Stop reason: SURG

## 2023-10-16 RX ORDER — IBUPROFEN 600 MG/1
600 TABLET ORAL EVERY 6 HOURS PRN
Status: DISCONTINUED | OUTPATIENT
Start: 2023-10-16 | End: 2023-10-16 | Stop reason: HOSPADM

## 2023-10-16 RX ADMIN — PREGABALIN 150 MG: 150 CAPSULE ORAL at 07:58

## 2023-10-16 RX ADMIN — SODIUM CHLORIDE, POTASSIUM CHLORIDE, SODIUM LACTATE AND CALCIUM CHLORIDE 9 ML/HR: 600; 310; 30; 20 INJECTION, SOLUTION INTRAVENOUS at 07:35

## 2023-10-16 RX ADMIN — HYDROMORPHONE HYDROCHLORIDE 0.5 MG: 1 INJECTION, SOLUTION INTRAMUSCULAR; INTRAVENOUS; SUBCUTANEOUS at 12:45

## 2023-10-16 RX ADMIN — ROCURONIUM BROMIDE 50 MG: 10 SOLUTION INTRAVENOUS at 09:34

## 2023-10-16 RX ADMIN — HYDROMORPHONE HYDROCHLORIDE 0.5 MG: 1 INJECTION, SOLUTION INTRAMUSCULAR; INTRAVENOUS; SUBCUTANEOUS at 12:30

## 2023-10-16 RX ADMIN — FENTANYL CITRATE 50 MCG: 50 INJECTION, SOLUTION INTRAMUSCULAR; INTRAVENOUS at 13:33

## 2023-10-16 RX ADMIN — FENTANYL CITRATE 50 MCG: 50 INJECTION, SOLUTION INTRAMUSCULAR; INTRAVENOUS at 12:21

## 2023-10-16 RX ADMIN — ONDANSETRON 4 MG: 2 INJECTION INTRAMUSCULAR; INTRAVENOUS at 11:48

## 2023-10-16 RX ADMIN — SODIUM CHLORIDE, POTASSIUM CHLORIDE, SODIUM LACTATE AND CALCIUM CHLORIDE: 600; 310; 30; 20 INJECTION, SOLUTION INTRAVENOUS at 09:34

## 2023-10-16 RX ADMIN — SUGAMMADEX 200 MG: 100 INJECTION, SOLUTION INTRAVENOUS at 11:52

## 2023-10-16 RX ADMIN — FENTANYL CITRATE 100 MCG: 50 INJECTION, SOLUTION INTRAMUSCULAR; INTRAVENOUS at 10:13

## 2023-10-16 RX ADMIN — FENTANYL CITRATE 50 MCG: 50 INJECTION, SOLUTION INTRAMUSCULAR; INTRAVENOUS at 12:40

## 2023-10-16 RX ADMIN — ACETAMINOPHEN 1000 MG: 500 TABLET ORAL at 07:58

## 2023-10-16 RX ADMIN — LIDOCAINE HYDROCHLORIDE 100 MG: 10 INJECTION, SOLUTION EPIDURAL; INFILTRATION; INTRACAUDAL; PERINEURAL at 10:04

## 2023-10-16 RX ADMIN — LIDOCAINE HYDROCHLORIDE 0.2 ML: 10 INJECTION, SOLUTION EPIDURAL; INFILTRATION; INTRACAUDAL; PERINEURAL at 07:35

## 2023-10-16 RX ADMIN — CELECOXIB 200 MG: 200 CAPSULE ORAL at 07:57

## 2023-10-16 RX ADMIN — FAMOTIDINE 20 MG: 20 TABLET, FILM COATED ORAL at 07:57

## 2023-10-16 RX ADMIN — PROPOFOL 200 MG: 10 INJECTION, EMULSION INTRAVENOUS at 09:34

## 2023-10-16 RX ADMIN — LIDOCAINE HYDROCHLORIDE 200 MG: 10 INJECTION, SOLUTION EPIDURAL; INFILTRATION; INTRACAUDAL; PERINEURAL at 09:49

## 2023-10-16 RX ADMIN — CEFAZOLIN SODIUM 2000 MG: 2 INJECTION, SOLUTION INTRAVENOUS at 09:40

## 2023-10-16 NOTE — ANESTHESIA PROCEDURE NOTES
Airway  Urgency: elective    Date/Time: 10/16/2023 9:35 AM  Airway not difficult    General Information and Staff    Patient location during procedure: OR    Indications and Patient Condition  Indications for airway management: airway protection    Preoxygenated: yes  MILS not maintained throughout  Mask difficulty assessment: 1 - vent by mask    Final Airway Details  Final airway type: endotracheal airway      Successful airway: ETT  Cuffed: yes   Successful intubation technique: direct laryngoscopy  Endotracheal tube insertion site: oral  Blade: Lanny  Blade size: 3  ETT size (mm): 7.0  Cormack-Lehane Classification: grade I - full view of glottis  Placement verified by: chest auscultation and capnometry   Measured from: lips  ETT/EBT  to lips (cm): 20  Number of attempts at approach: 1  Assessment: lips, teeth, and gum same as pre-op and atraumatic intubation    Additional Comments  Negative epigastric sounds, Breath sound equal bilaterally with symmetric chest rise and fall

## 2023-10-16 NOTE — ANESTHESIA PREPROCEDURE EVALUATION
Anesthesia Evaluation     Patient summary reviewed and Nursing notes reviewed   no history of anesthetic complications:   NPO Solid Status: > 8 hours  NPO Liquid Status: > 8 hours           Airway   Mallampati: II  TM distance: >3 FB  Neck ROM: full  No difficulty expected  Dental      Pulmonary - negative pulmonary ROS and normal exam   Cardiovascular - negative cardio ROS and normal exam        Neuro/Psych  (+) psychiatric history  GI/Hepatic/Renal/Endo      Musculoskeletal     Abdominal    Substance History      OB/GYN          Other                    Anesthesia Plan    ASA 2     general     intravenous induction     Anesthetic plan, risks, benefits, and alternatives have been provided, discussed and informed consent has been obtained with: patient.    Plan discussed with CRNA.    CODE STATUS:

## 2023-10-16 NOTE — OP NOTE
Subjective     Date of Service:  10/16/23  Time of Service:  12:23 EDT    Surgical Staff: Surgeon(s) and Role:     * Latasha Farr MD - Primary     * Zahra Tyson MD - Resident - Assisting   Additional Staff:   Assistant: Franklyn Martinez PA-C  was responsible for performing the following activities: Retraction, Suction, Suturing, and Closing and their skilled assistance was necessary for the success of this case.     Pre-operative diagnosis(es): Pre-Op Diagnosis Codes:     * Endometrial cancer [C54.1]     Post-operative diagnosis(es): Post-Op Diagnosis Codes:     * Endometrial cancer [C54.1]   Procedure(s): Procedure(s):  INJECTION OF ICG DYE, TOTAL LAPAROSCOPIC HYSTERECTOMY BILATERAL SALPINGOOPHORECTOMY, SENTINEL PELVIC LYMPH NODE DISSECTION, LEFT URETEROLYSIS, OMENTECTOMY WITH DAVINCI ROBOT     Antibiotics: cefazolin (Ancef) ordered on call to OR     Anesthesia: Type: General  ASA:  II     Objective      Operative findings: At the time of laparoscopy, sentinel lymph nodes mapped bilaterally to the external iliac lymph nodes in the right obturator lymph nodes.  There is an enlarged left ovary.  On frozen section, this was malignant.  Due to this making the patient stage III cancer, no further lymph node dissection was performed although no bulky lymph nodes were identified.  There is no ascites, omental caking, or other clinical concerning findings regarding metastasis.   Specimens removed: ID Type Source Tests Collected by Time   A : LEFT PELVIC SENTINEL LYMPH NODE Tissue [place] Lymph Node TISSUE PATHOLOGY EXAM Latasha Farr MD 10/16/2023 1041   B : RIGHT PELVIC SENTINEL Tissue [place] Lymph Node TISSUE PATHOLOGY EXAM Latasha Farr MD 10/16/2023 1053   C : UTERUS, CERVIX, RIGHT TUBE AND OVARY Tissue Uterus, Cervix, R Fallopian Tube, R Ovary TISSUE PATHOLOGY EXAM Latasha Farr MD 10/16/2023 1111   D : LEFT TUBE AND OVARY FOR FROZEN Tissue Ovary, Left with Fallopian Tube TISSUE  PATHOLOGY EXAM Latasha Farr MD 10/16/2023 1118   E : OMENTUM FOR PERMANENT Tissue Omentum TISSUE PATHOLOGY EXAM Latasha Farr MD 10/16/2023 1129      Fluid Intake and Output: I/O this shift:  In: 1000 [I.V.:1000]  Out: 200 [Urine:200]   Blood products used: No   Drains: [REMOVED] Urethral Catheter Silicone 16 Fr. (Removed)      Implant Information: Nothing was implanted during the procedure   Complications: No immediate   Intraoperative consult(s):    Condition: stable   Disposition: to PACU and then discharge home       Indications:  Patient is a pleasant 73-year-old woman who was diagnosed with a serous endometrial cancer.  CT scan was concerning for metastatic disease given enlarged ovary.   was elevated.  Risks and benefits of surgery were discussed.  Consent was signed and on chart.    Procedure: After obtaining informed consent, the patient was taken to the operating room and underwent general endotracheal anesthesia after patient and site verification. The feet were placed in Guilherme stirrups. The arms were tucked at the sides. Steep Trendelenburg positioning was tested and found to be adequate. The abdomen, perineum, and vagina were prepped and draped in the usual sterile fashion. Donohue catheter was anchored. Retractors were used to visualize the cervix.  Cervix was injected at 3 and 9:00 with ICG dye in the standard fashion.  Cervix was sounded and the appropriate uterine manipulator was called for.  Uterine manipulator was secured with out difficulty.  Attention was turned to the abdomen. Prior to each incision, skin was injected with 0.5% Marcaine with epinephrine.  Varies needle was inserted at the umbilicus and the abdomen was insufflated to pressure 15 mmHg with CO2 gas.  An 8 mm trocar was inserted at the umbilical midline position without difficulty.  Laparoscope was introduced to confirm positioning. Steep Trendelenburg was called for.  2 left-sided 8 mm and 2 right-sided 8 mm trochars  were all placed under direct visualization.  The above findings were noted.  Da Cami robot was docked to the patient and surgeon retired to the operating console.    The peritoneum overlying the pelvic sidewalls was divided with monopolar scissors. Infundibulopelvic ligament on the right was isolated from surrounding structures and pedicles were created using bipolar cautery and scissors. Likewise, the round ligaments were divided.  Utero-ovarian ligament on the left was divided in a similar fashion.  Anterior and posterior leaves of the broad ligament were divided with monopolar scissors. A bladder flap was developed.  Uterine vessels were isolated. Pedicles were created at the level of the uterine vessels using bipolar cautery and scissors. Cardinal ligament and uterosacral ligament complexes were divided in a similar fashion. Monopolar scissors were used to perform a circumferential colpotomy and the specimen was handed off intact via the vagina for permanent pathology.     Bilateral pelvic,common, and periaortic sentinel lymph node dissection was performed according to the usual anatomic landmarks including the aorta and its bifurcation, the common iliac arteries and their bifurcations, the external and internal iliac arteries, the obturator and genitofemoral nerves, and the umbilical ligament. Pelvic lymph nodes were placed in Endo Catch bags and removed from the field.    Enlarged left ovary was reidentified.  Ureter was in close proximity of this and left ureterolysis had to be performed in order to facilitate removal of specimen.  Adhesions of the ovary to the mesentery were divided.  Infundibulopelvic ligament was divided using bipolar cautery monopolar scissors.  Any residual attachments of the specimen to the patient were divided.  Endoscopic bag was placed by the vagina and specimen was removed without any tumor spillage.    Attention was turned to the omentectomy.  Omentectomy was performed using  bipolar cautery and monopolar scissors.  This was infracolic omentectomy.  The vaginal cuff was closed with 0 Vicryl suture in a running locking fashion x2 layers. Good hemostasis was noted. Additional hemostasis was achieved at the pelvis as needed using bipolar cautery. Da Cami robot was undocked from the patient and the surgeon returned to the bedside.  Trochars were removed under direct visualization.  CO2 gas was allowed to escape from the abdominal cavity. At that point, no fascial defects could be palpated.  The skin was closed with 3-0 Monocryl in subcuticular stitch and glue was placed at the skin. The vagina was inspected.  Occluder and all instruments had been removed from the vagina.  Good hemostasis was noted at the vagina.  Bladder was back filled with 120 mL of sterile solution and the giraldo was discontinued.  The patient was taken to the recovery room in good condition. There were no immediate complications. All counts were correct.          Latasha Farr MD  10/16/23  12:23 EDT

## 2023-10-16 NOTE — ANESTHESIA POSTPROCEDURE EVALUATION
Patient: Cheryl Storm    Procedure Summary       Date: 10/16/23 Room / Location:  [facility]  [facility]    Anesthesia Start: 0930 Anesthesia Stop: 1209    Procedure: INJECTION OF ICG DYE, TOTAL LAPAROSCOPIC HYSTERECTOMY BILATERAL SALPINGOOPHORECTOMY, SENTINEL/COMPLETE LYMPH NODE DISSECTION, OMENTECTOMY WITH DAVINCI ROBOT (Bilateral) Diagnosis:       Endometrial cancer      (Endometrial cancer [C54.1])    Surgeons: Latasha Farr MD Provider: Bony Overton MD    Anesthesia Type: general ASA Status: 2            Anesthesia Type: general    Vitals  No vitals data found for the desired time range.          Post Anesthesia Care and Evaluation    Patient location during evaluation: PACU  Patient participation: complete - patient participated  Level of consciousness: awake and alert  Pain management: adequate    Airway patency: patent  Anesthetic complications: No anesthetic complications  PONV Status: none  Cardiovascular status: hemodynamically stable and acceptable  Respiratory status: nonlabored ventilation, acceptable and nasal cannula  Hydration status: acceptable

## 2023-10-16 NOTE — INTERVAL H&P NOTE
[facility] Pre-op    Full history and physical note from office is attached.    VS: /63  HR 60  RR 16  T 97.1  sat 99%RA    LAB Results:  Lab Results   Component Value Date    WBC 5.18 09/29/2023    HGB 13.6 09/29/2023    HCT 40.9 09/29/2023    MCV 93.8 09/29/2023     09/29/2023    NEUTROABS 3.12 09/29/2023    GLUCOSE 105 (H) 09/29/2023    BUN 17 09/29/2023    CREATININE 0.65 09/29/2023     09/29/2023    K 4.8 09/29/2023     09/29/2023    CO2 29.0 09/29/2023    CALCIUM 9.6 09/29/2023    ALBUMIN 4.5 09/29/2023    AST 25 09/29/2023    ALT 18 09/29/2023    BILITOT 0.3 09/29/2023      Latest Reference Range & Units 09/29/23 11:30    0.0 - 38.1 U/mL 116.0 (H)   (H): Data is abnormally high    10/12/23 CT abd:  Findings:  Chest: There is no pathologic axillary adenopathy or other worrisome body wall soft tissue finding in the chest. No acute findings are present in the partially characterized upper abdomen. There is no pleural or pericardial effusion. There is no   pathologic mediastinal adenopathy. Nonaneurysmal thoracic aorta. The pulmonary arteries are well-opacified and centrally. Evaluation of the osseous structures demonstrates no evidence of acute fracture or aggressive osseous lesion, with multilevel   thoracic spondylosis change present. Evaluation of the lung fields demonstrates no evidence of acute infectious process or distinct suspicious pulmonary nodularity.     Abdomen and pelvis: The body wall soft tissues demonstrates no acute or suspicious findings. The osseous structures demonstrate no evidence of acute fracture or aggressive osseous lesion, with multilevel spondylosis change present. The liver, spleen,   pancreas and bilateral adrenal glands demonstrate homogeneous enhancement without suspicious focal lesion. A nonspecific 4 mm cystic finding is noted along the posterior aspect of the pancreatic neck. Unremarkable gallbladder. Small and large bowel loops    are nondilated. There is no suspicious focal bowel wall thickening. There is no worrisome retroperitoneal lymphadenopathy. Atherosclerotic, nonaneurysmal abdominal aorta. Heterogeneous bilateral adnexal findings are present, including a   multilobulated/multilocular cystic finding on the left, with somewhat stellate central solid component, overall measuring 7.5 cm, as well as an irregular thick-walled and enhancing heterogeneous partially cystic mass on the right measuring 4.3 cm.    Impression:  Irregular bilateral cystic adnexal findings are present, appearing somewhat solid and abnormally enhancing on the right measuring 4.3 cm and multilocular, with a stellate enhancing solid component on the left measuring 7.5 cm. Findings are suspicious for   extension of or metastatic involvement from reported primary GYN neoplasm. Correlate with findings on reported upcoming salpingo-oophorectomy.     Otherwise no suspicious findings for more distant metastatic involvement in the chest, abdomen and pelvis.    Cancer Staging (if applicable)  Cancer Patient: _x_ yes __no __unknown__N/A; If yes, clinical stage T:_III_ N:_0_M:_0_, stage group III due to elevated ca 125 and bilateral ovarian masses      Impression: Endometrial cancer      Plan: INJECTION OF ICG DYE, TOTAL LAPAROSCOPIC HYSTERECTOMY BILATERAL SALPINGOOPHORECTOMY, SENTINEL/COMPLETE LYMPH NODE DISSECTION, OMENTECTOMY WITH DAVINCI ROBOT       CLAUDETTE Vaz   10/16/2023   07:41 EDT    I saw and evaluated the patient. I agree with the findings and the plan of care as documented in the note.    Latasha Farr MD  10/16/23  08:26 EDT

## 2023-10-23 ENCOUNTER — TELEPHONE (OUTPATIENT)
Dept: GYNECOLOGIC ONCOLOGY | Facility: CLINIC | Age: 73
End: 2023-10-23
Payer: MEDICARE

## 2023-10-23 LAB
CYTO UR: NORMAL
LAB AP CASE REPORT: NORMAL
LAB AP CLINICAL INFORMATION: NORMAL
LAB AP DIAGNOSIS COMMENT: NORMAL
LAB AP SPECIAL STAINS: NORMAL
Lab: NORMAL
PATH REPORT.ADDENDUM SPEC: NORMAL
PATH REPORT.FINAL DX SPEC: NORMAL
PATH REPORT.GROSS SPEC: NORMAL

## 2023-11-03 ENCOUNTER — CLINICAL SUPPORT (OUTPATIENT)
Dept: GENETICS | Facility: HOSPITAL | Age: 73
End: 2023-11-03

## 2023-11-03 ENCOUNTER — LAB (OUTPATIENT)
Dept: LAB | Facility: HOSPITAL | Age: 73
End: 2023-11-03

## 2023-11-03 ENCOUNTER — OFFICE VISIT (OUTPATIENT)
Dept: GYNECOLOGIC ONCOLOGY | Facility: CLINIC | Age: 73
End: 2023-11-03
Payer: MEDICARE

## 2023-11-03 VITALS
HEIGHT: 64 IN | OXYGEN SATURATION: 99 % | WEIGHT: 123.1 LBS | SYSTOLIC BLOOD PRESSURE: 131 MMHG | DIASTOLIC BLOOD PRESSURE: 72 MMHG | RESPIRATION RATE: 18 BRPM | TEMPERATURE: 97.3 F | BODY MASS INDEX: 21.02 KG/M2 | HEART RATE: 63 BPM

## 2023-11-03 DIAGNOSIS — Z13.79 GENETIC TESTING: Primary | ICD-10-CM

## 2023-11-03 DIAGNOSIS — C54.1 ENDOMETRIAL CANCER: Primary | ICD-10-CM

## 2023-11-03 DIAGNOSIS — Z80.52 FAMILY HISTORY OF BLADDER CANCER: ICD-10-CM

## 2023-11-03 DIAGNOSIS — C54.1 ENDOMETRIAL MALIGNANT NEOPLASM: ICD-10-CM

## 2023-11-03 DIAGNOSIS — Z80.0 FAMILY HISTORY OF MALIGNANT NEOPLASM OF GASTROINTESTINAL TRACT: ICD-10-CM

## 2023-11-03 DIAGNOSIS — Z80.0 FAMILY HISTORY OF MALIGNANT NEOPLASM OF PANCREAS: ICD-10-CM

## 2023-11-03 PROCEDURE — 96040: CPT

## 2023-11-03 RX ORDER — DORZOLAMIDE HYDROCHLORIDE AND TIMOLOL MALEATE 20; 5 MG/ML; MG/ML
1 SOLUTION/ DROPS OPHTHALMIC 2 TIMES DAILY
COMMUNITY
Start: 2023-10-29

## 2023-11-03 NOTE — PROGRESS NOTES
Date of Visit: 11/3/2023   Patient Name: Cheryl Storm  : 1950   MRN: [id number]     Referring Provider: Latasha Rodarte MD    REASON FOR CONSULT  Cheryl Storm is a pleasant 73 y.o. female referred for genetic counseling following a recent diagnosis of serous endometrial cancer and due to a family history of colorectal and pancreatic cancer.  Postmenopausal bleeding prompted Ms. Storm to pursue transvaginal ultrasound screening on  2023. 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  Ms Storm recently had a hysterectomy and bilateral salping-oophorectomy performed.  Ms. Storm is discussing chemotherapy and radiation treatment options with her provider.  She has no history of colon polyps and is current with her colonoscopy screenings.  She is current with her breast cancer screenings and has never had a breast biopsy performed.  She began menarche at age 19 and has never has been pregnant.  She began menopause at age 46.  She reported to have used estrogen - HRT for 10 years.  Ms. Storm was interested in discussing her risk for a hereditary cancer syndrome and genetic testing for cancer susceptibility.     PERTINENT FAMILY HISTORY:  A cancer-focused four-generation pedigree was obtained via patient reporting    Brother - pancreatic cancer, 70's  Father - pancreatic cancer, 70's  Paternal Grandfather - pancreatic cancer, 80  Mother - bladder cancer, 93  Maternal Uncle 1 - colon cancer, 80  Maternal Uncle 2 - colon cancer - 80  Maternal 1st Cousin ( son of Maternal Uncle 2) - unspecified cancer, age unknown ()    RISK ASSESSMENT  Ms. Storm's reported diagnosis of endometrial cancer in conjunction with the family history of cancer is suggestive of hereditary cancer risk.  Ms. Storm meets NCCN guidelines criteria for genetic testing based on two 1st degree relatives diagnosed with pancreatic cancer, and in conjunction with her own endometrial  cancer diagnosis, she has three 1st and 2nd degree maternal relatives diagnosed with Davalos syndrome related cancers.    Davalos syndrome is the most common hereditary colorectal syndrome accounting for 2% - 5% of all colorectal cancer cases.  Davalos syndrome can significantly increase the lifetime risk for endometrial/uterine cancer up to 57%.  The general population lifetime risk for uterine cancer risk 3.1%.  Davalos Syndrome is caused by mutations found in DNA mismatch repair (MMR) genes.  The genes associated with Davalos syndrome are MLH1, MSH2, MSH6, PMS2, and EPCAM.  EPCAM, though not a MMR gene, is still associated with Davalos syndrome as mutations in this gene can have a silencing effect on the MSH2 gene.  Davalos syndrome can also increase an individual's risk for other cancers such as ovarian, urothelial, gastric, pancreas, prostate, brain, small bowel, and biliary tract.  The general population lifetime risk for colorectal cancer development is around 4.1%, and approximately 5% - 16% of all colorectal cancers are attributed to hereditary mutations. The degree of risk and screening recommendations vary by gene, the individual's age, and related family history.     The general population lifetime risk for pancreatic cancer is 1% - 2%, and pancreatic cancer associated with inherited mutations accounts for approximately 10% of all pancreatic cancer cases.  Most of these hereditary pancreatic cancer cases are associated with mutations in genes associated with known cancer syndromes such as, but not limited to, Hereditary Breast and Ovarian Cancer syndrome, Davalos syndrome, Li Fraumeni syndrome, and Peutz-Jeghers syndrome.  Other high risk pancreatic cancer risk genes identified by the National Comprehensive Cancer Network (NCCN) are PALB2, TIAGO, and CDKN2A.  Mutations in these genes can cause an increase in lifetime pancreatic cancer risk potentially over 15% as with CDKN2A and STK11 (Puetz-Jeghers syndrome).  The degree  "of risk and screening recommendations vary by gene, the individual's age, and related family history.    GENETIC COUNSELING  We reviewed the family history information in detail. Cases of cancer follow three general patterns: sporadic, familial, and hereditary.  While most cancer cases are sporadic (~70% of cancer cases), some cases appear to occur in family clusters.  These cases are said to be familial and account for around 20% of cancer cases.  Familial cases may be due to a combination of shared genes and environmental factors among family members that we cannot evaluate via genetic testing at this time.  In 5% - 10% of cancer cases, the risk for cancer is inherited, and the genes responsible for the increased cancer risk are known.      Family histories typical of hereditary cancer syndromes usually include multiple first- and second-degree relatives diagnosed with cancer types that define a syndrome.  These cases tend to be diagnosed at younger-than-expected ages and can be bilateral or multifocal.  The cancer in these families follows an autosomal dominant inheritance pattern, which indicates the likely presence of a mutation in a cancer gene.  Children and siblings of an individual carrying a mutation have a 50% chance of inheriting that mutation, thereby inheriting the increased risk to develop cancer.  However, it is not recommended to pursue genetic testing for adult-onset cancers in children as the results would not have clinical utility until some time in adulthood among other ethical reasons.  These mutations can be passed down from the maternal or the paternal lineage.    We discussed that risk factors or \"red flags\" for hereditary risk include cancers diagnosed at earlier-than-average ages, multiple family members diagnosed with cancers associated with mutations in the same gene, or multiple generations of associated cancers. Dependent on the specific cancer type or syndrome, there exists the " potential for several clinically significant genes related related to the cancer's onset or increased risk for development.  Therefore, the standard approach to hereditary cancer genetic testing at this time is via multi-gene panel analyzing several genes associated with a hereditary risk for cancer.  Once results are completed, we will review them in the context of the patient's personal and family history to determine what, if any, post-test cancer risk management changes are recommended.     GENETIC TESTING  The risks, benefits, and limitations of genetic testing and implications for clinical management following testing were reviewed.  The implications of a positive, negative, or VUS test result were discussed.  We also discussed the importance of testing an affected relative.Genetic test results can influence decisions regarding screening and prevention.  Genetic testing can have significant psychological implications for both individuals and families. Also discussed was the possibility of insurance discrimination based on genetic test results and the laws in place to prevent this, as well as the limitations of these laws.      The Genetic Information Nondiscrimination Act (LISBET) is a federal law enacted in May 2008.  LISBET prohibits discrimination on the basis of genetic information such as genetic test results with respect to health insurance and employment status.  Under Title 1 of LISBET, health insurance companies are prohibited from using an individual's genetic information to change premiums, drop or change an individual's coverage, or prevent coverage from being acquired.  Title 2 of LISBET prohibits employers from using genetic information in employment decisions such as, but not limited to, hiring, firing, promotions, pay, and job assignments.  LISBET protections do not protect against genetic discrimination by life insurance, long-term care or disability insurance,  service members, federal  "employees, those using the  Health Service, or those employed by a small business with fewer than 15 employees.    We discussed panel testing, which could involve testing numerous genes associated with increased cancer risk.  With multigene panel testing, it is not uncommon for a variant of uncertain significance (VUS) to be identified.  Variants are termed \"VUS\" when there is not sufficient evidence to classify the variant as a negative (not harmful) variant or a postive (harmful) mutation. Genetic testing labs work to reclassify all VUSs overtime and will issue an updated report when a reclassification occurs.  The majority (over 90%) of VUSs that are reclassified are found to be negative genetic variants, however a small percentage will be upgraded to a harmful mutation. Clinically, a VUS is treated as a negative result.  If genetic testing is negative or a variant of uncertain significance (VUS) is found, management should be guided by a family history-based risk assessment.  Medical care should not be altered based on a VUS result.  Genetic testing for other unaffected (never had cancer) relatives is not recommended for a VUS.    We discussed that there are limitations to a negative test result.  If Ms. Storm tests negative it could be for several different reasons such as:    The possibility that there is a pathogenic variant causing cancer in the family, and that gene was on the patient's test, but Ms. Storm did not inherit it.  We cannot know if this is the case by only testing the patient.  A familial mutation could therefore still be present in the family and other close family members are still at risk.  Ms. Storm could have a pathogenic variant in one of the genes tested for that was not detected. Current testing will identify the overwhelming majority of pathogenic variants, but some are not detectable with current technology.   Ms. Storm may carry a pathogenic variant in another gene associated with " "hereditary cancer predisposition that was not tested for, or the risk in the family may be due to other genetic factors that are not well understood.    ASSESSMENT AND PLAN  Ms. Storm meets NCCN guidelines for genetic testing.     Reviewed the options for genetic testing including a multi-gene panel of high risk genes, a panel of genes with known management guidelines, or a broader approach including more newly discovered genes. Reviewed the benefits and drawbacks of this approach, including finding mutations in genes that are less well understood, or results that are unexpected based on this family history.    Ms. Storm elected to pursue genetic testing.  [employer]'s CancerNext panel was ordered, which analyzes 36 genes associated with an increased cancer risk. The genes on this panel include APC, TIAGO, AXIN2, BARD1, BMPR1A, BRCA1, BRCA2, BRIP1, CDH1, CDK4, CDKN2A, CHEK2, DICER1, EPCAM, GREM1, HOXB13, MLH1, MSH2, MSH3, MSH6, MUTYH, NBN, NF1, NTHL1, PALB2, PMS2, POLD1, POLE, PTEN, RAD51C, RAD51D, RECQL, SMAD4, SMARCA4, STK11, and TP53.      A blood sample for genetic evaluation was [facility] at [facility] on 11/3/2023.      Genetic testing results are expected in 2 - 4 weeks from 11/3/2023.  We will contact the patient when results are received.    Ms. Storm asked excellent questions during the consult.  Ms. Storm was tearful and anxious about her diagnosis.  She describes herself as a very creative person and loves art, particularly sculpting.  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  One of her worries about chemotherapy is that it will take away ability to create art and teach, often referring to the chemotherapy as \"poison.\"  She wishes to learn more about therapy options that can help her retain her creative skills while undergoing treatment or help her find new avenues of creation and self-expression that can help her fulfill her passions while going through treatment. This clinic " encounter was 1 hour.      Fly Schwarz MS  Genetic Counselor  [facility]

## 2023-11-03 NOTE — PROGRESS NOTES
"Cheryl Storm  [id number]  1950      Reason for Visit:  Treatment planning for newly diagnosed stage III serous endometrial cancer, Postoperative evaluation    History of Present Illness:  Patient is a very pleasant 73 y.o. woman who presents for a post operative evaluation status post INJECTION OF ICG DYE, TOTAL LAPAROSCOPIC HYSTERECTOMY BILATERAL SALPINGOOPHORECTOMY, SENTINEL PELVIC LYMPH NODE DISSECTION, LEFT URETEROLYSIS, OMENTECTOMY WITH DAVINCI ROBOT performed on 10/16/23.      Surgery and hospital course were uncomplicated.  Today, patient notes normal bowel and bladder function.  Her pain is well controlled.  Patient is doing well from a physical standpoint related to her surgery, but is emotionally troubled regarding her diagnosis.    Past Medical History, Past Surgical History, Social History, Family History have been reviewed and are without significant changes except as mentioned.    Review of Systems   All other systems were reviewed and are negative except as mentioned above.    Medications:  The current medication list was reviewed in the EMR    ALLERGIES:  No Known Allergies      /72   Pulse 63   Temp 97.3 °F (36.3 °C) (Temporal)   Resp 18   Ht 162.6 cm (64\")   Wt 55.8 kg (123 lb 1.6 oz)   LMP  (LMP Unknown)   SpO2 99%   BMI 21.13 kg/m²   ECOG score: 0       Physical Exam  Constitutional:  Patient is a pleasant woman in no acute distress.  Gastrointestinal: Abdomen is soft and appropriately tender.  There is no mass palpated.  There is no rebound or guarding.  Incision(s) is clean, dry and intact.  Extremities:  Bilateral lower extremities are non-tender.  Gynecologic:External genitalia are free from lesion. On speculum examination, the vaginal cuff was intact and no lesions were appreciated.  On bimanual examination, no fullness was appreciated.  Uterus, cervix and adnexa were absent.  There was no significant tenderness.  Rectovaginal exam was deferred.      PATHOLOGY:  Final " Diagnosis   1.  LEFT PELVIC SENTINEL LYMPH NODE, BIOPSY:  One lymph node, negative for metastatic carcinoma     2.  RIGHT PELVIC SENTINEL LYMPH NODE, BIOPSY:  One lymph node, positive for isolated tumor cells, see Comment     3.  UTERUS, CERVIX, RIGHT OVARY AND FALLOPIAN TUBE; HYSTERECTOMY AND RIGHT SALPINGO-OOPHORECTOMY:  Papillary serous carcinoma with dedifferentiation, arising within an endometrial polyp  The surgical margins are free of carcinoma  Please see CAP staging protocol below for full report     4.  LEFT OVARY AND FALLOPIAN TUBE; SALPINGO-OOPHORECTOMY:  Positive for serous carcinoma, see comment and CAP staging protocol     5.  OMENTUM, OMENTECTOMY:  Negative for carcinoma        UTERUS ENDOMETRIUM TEMPLATE:     SPECIMEN TYPE/ PROCEDURE: Total hysterectomy and right salpingo-oophorectomy  SPECIMEN INTEGRITY:   Intact  HISTOLOGIC TYPE:  Serous carcinoma with dedifferentiation, see Comment  HISTOLOGIC GRADE:  High-grade  MYOMETRIAL INVASION (Present/Not identified):  Not identified  INVOLVEMENT OF CERVICAL STROMA:  Not identified  EXTENT OF INVOLVEMENT OF OTHER TISSUE/ORGANS:  Left ovary  UTERINE SEROSA INVOLVEMENT:   Not identified  PERITONEAL/ASCITIC FLUID:  Not submitted/unknown  LYMPHATIC AND/OR VASCULAR INVASION:  Present, low (less than 3 vessel involvement)  MARGINS STATUS:  All margins negative for invasive carcinoma  PELVIC LYMPH NODES (SUBMITTED/NONE):  Submitted  NUMBER OF PELVIC LYMPH NODES EXAMINED:  2  NUMBER OF PELVIC SENTINEL NODES EXAMINED:  2  LATERALITY OF PELVIC LYMPH NODES EXAMINED:   Right and Left  NUMBER OF PELVIC LYMPH NODES WITH MACROMETASTASIS:  0  NUMBER OF PELVIC LYMPH NODES WITH MICROMETASTASIS:  0  NUMBER OF PELVIC LYMPH NODES WITH ITC’S:  1     AJCC PATHOLOGIC STAGE:  (COMPLETED BY PATHOLOGIST, BASED ONLY ON TISSUE FINDINGS, MORE EXTENSIVE DISEASE MAY NOT BE KNOWN TO THE PATHOLOGIST)  pT=    3a  pN=    0(i+)  pM=    N/A     P53:     Amplified/mutated (in both the  conventional serous and dedifferentiated components)  ER:       Negative (0%) in the dedifferentiated component; positive in the conventional serous component (30%, 2+)  IL:       Focally positive (5%, 2+) in both the conventional serous and dedifferentiated components  HER2:  Negative (0+) in the dedifferentiated component and strongly positive (3+) in the conventional serous component  PD-L1:  <1 (see special stains)  MSI:      Intact (per clinic note, stains not performed at [facility])    ASSESSMENT/PLAN:  Cheryl Storm returns for a post-operative evaluation today.  All pathology reports were discussed with the patient.  In keeping with NCCN guidelines, additional treatment was recommended.  Patient was given a copy of NCCN guidelines.  Due to the ovarian involvement, this is a stage III cancer, mixed histology (dedifferentiated, serous).  Due to this being 70% dedifferentiated histology, patient is not a candidate for research trial and we approached this as a dedifferentiated cancer.  We reviewed the inherent side effects of Carboplatin/Paclitaxel chemotherapy, including but not limited to: bone marrow suppression, peripheral neuropathy, fatigue, alopecia, constipation, and less commonly nausea/vomiting, allergic reaction, extravasation.   ARSLAN trial was a phase 3 trial which investigated the addition of dostarlimab to standard chemotherapy for advanced (stage III/IV) or recurrent endometrial cancer every three weeks with dostarlimab added as a maintenance q 6 weeks for up to 3 years. In the dMMR/MSH-H group (about 24% of patients) the progression free survival at 2 years was 61% for the dostarlimab group which was a 15% improvement over the placebo group.  In the overall population, PFS at 2 years was 36.1% in the study group vs 18% in the placebo group with OS being 71% vs 56%.    In general, the addition of dostarlimab to chemotherapy resulted in an improved PFS.  We discussed referral to radiation oncology  for vaginal brachytherapy.  Rationale for this was reviewed.    Patient was completely emotionally overwhelmed today.  She became tearful and I think it was difficult for her to process all of the information because of the emotional component related to her advanced endometrial cancer diagnosis.  I asked her about counseling to see if perhaps she would benefit from a referral, but she sees 2 therapist a week already.  She has concerns regarding her quality of life on treatment and risk of neuropathy.  She was planning on being out of the country the entire month of December and I advised against this as we typically try to start treatment within 6 weeks of surgery.  I recommended a truncated trip, but wanted her to continue the trip due to quality of life.  She had questions about sexual relations postoperatively and we discussed this.    She had questions regarding her survival.  I stated if she did not undergo treatment her cancer would certainly recur probably within a few months and certainly within the next year.  She would not be a surgical candidate at that point.  She has about survival with treatment and I estimated 50% survival.  I later was able to look it up and there is a 38% 5-year survival for stage III dedifferentiated carcinoma of the endometrium.  This was a very limited retrospective cohort study of 52 patients.  I had told her that there is limited information for patients with her exact histology.  Further, the mixed histology makes estimating survival challenging.      Overall, the patient is very pleased with her care.  I recommended continuation of post operative precautions as discussed.     She is to follow-up for chemotherapy education and consent.  We discussed the rationale for genetic counseling referral and she has that later today.  No referral was placed for vaginal brachytherapy as patient is uncertain how she wants to proceed at all at this point.    I spent 21 minutes caring for  Cheryl on this date of service. This time includes time spent by me in the following activities: preparing for the visit, reviewing tests, performing a medically appropriate examination and/or evaluation, counseling and educating the patient/family/caregiver, ordering medications, tests, or procedures, referring and communicating with other health care professionals, and documenting information in the medical record  This was completely in counseling and outside of normal postoperative care.  The postoperative portion of her visit was approximately 7 minutes in length.  Latasha Farr MD  11/03/23  08:14 EDT

## 2023-11-10 DIAGNOSIS — C54.1 ENDOMETRIAL CANCER: Primary | ICD-10-CM

## 2023-11-14 ENCOUNTER — HOSPITAL ENCOUNTER (OUTPATIENT)
Dept: ONCOLOGY | Facility: HOSPITAL | Age: 73
Discharge: HOME | End: 2023-11-14
Payer: MEDICARE

## 2023-11-14 ENCOUNTER — OFFICE VISIT (OUTPATIENT)
Dept: GYNECOLOGIC ONCOLOGY | Facility: CLINIC | Age: 73
End: 2023-11-14
Payer: MEDICARE

## 2023-11-14 ENCOUNTER — EDUCATION (OUTPATIENT)
Dept: ONCOLOGY | Facility: HOSPITAL | Age: 73
End: 2023-11-14
Payer: MEDICARE

## 2023-11-14 ENCOUNTER — HOSPITAL ENCOUNTER (OUTPATIENT)
Dept: ONCOLOGY | Facility: HOSPITAL | Age: 73
Discharge: HOME | End: 2023-11-14
Admitting: OBSTETRICS & GYNECOLOGY
Payer: MEDICARE

## 2023-11-14 VITALS
HEIGHT: 64 IN | TEMPERATURE: 97.5 F | BODY MASS INDEX: 20.76 KG/M2 | RESPIRATION RATE: 18 BRPM | DIASTOLIC BLOOD PRESSURE: 65 MMHG | WEIGHT: 121.6 LBS | HEART RATE: 69 BPM | SYSTOLIC BLOOD PRESSURE: 132 MMHG | OXYGEN SATURATION: 96 %

## 2023-11-14 DIAGNOSIS — T45.1X5A ALOPECIA DUE TO CYTOTOXIC DRUG: ICD-10-CM

## 2023-11-14 DIAGNOSIS — L65.8 ALOPECIA DUE TO CYTOTOXIC DRUG: ICD-10-CM

## 2023-11-14 DIAGNOSIS — C54.1 ENDOMETRIAL CANCER: Primary | ICD-10-CM

## 2023-11-14 DIAGNOSIS — C54.1 ENDOMETRIAL CANCER: ICD-10-CM

## 2023-11-14 LAB
ALBUMIN SERPL-MCNC: 4.5 G/DL (ref 3.5–5.2)
ALBUMIN/GLOB SERPL: 1.9 G/DL
ALP SERPL-CCNC: 141 U/L (ref 39–117)
ALT SERPL W P-5'-P-CCNC: 130 U/L (ref 1–33)
ANION GAP SERPL CALCULATED.3IONS-SCNC: 10 MMOL/L (ref 5–15)
AST SERPL-CCNC: 72 U/L (ref 1–32)
BASOPHILS # BLD AUTO: 0.03 10*3/MM3 (ref 0–0.2)
BASOPHILS NFR BLD AUTO: 0.6 % (ref 0–1.5)
BILIRUB SERPL-MCNC: 0.3 MG/DL (ref 0–1.2)
BUN SERPL-MCNC: 13 MG/DL (ref 8–23)
BUN/CREAT SERPL: 21.3 (ref 7–25)
CALCIUM SPEC-SCNC: 9.9 MG/DL (ref 8.6–10.5)
CANCER AG125 SERPL QL: 47.7 U/ML (ref 0–38.1)
CHLORIDE SERPL-SCNC: 102 MMOL/L (ref 98–107)
CO2 SERPL-SCNC: 27 MMOL/L (ref 22–29)
CREAT SERPL-MCNC: 0.61 MG/DL (ref 0.57–1)
DEPRECATED RDW RBC AUTO: 47 FL (ref 37–54)
EGFRCR SERPLBLD CKD-EPI 2021: 95.1 ML/MIN/1.73
EOSINOPHIL # BLD AUTO: 0.23 10*3/MM3 (ref 0–0.4)
EOSINOPHIL NFR BLD AUTO: 4.4 % (ref 0.3–6.2)
ERYTHROCYTE [DISTWIDTH] IN BLOOD BY AUTOMATED COUNT: 13.2 % (ref 12.3–15.4)
GLOBULIN UR ELPH-MCNC: 2.4 GM/DL
GLUCOSE SERPL-MCNC: 118 MG/DL (ref 65–99)
HCT VFR BLD AUTO: 38.7 % (ref 34–46.6)
HGB BLD-MCNC: 12.8 G/DL (ref 12–15.9)
IMM GRANULOCYTES # BLD AUTO: 0 10*3/MM3 (ref 0–0.05)
IMM GRANULOCYTES NFR BLD AUTO: 0 % (ref 0–0.5)
LYMPHOCYTES # BLD AUTO: 1.63 10*3/MM3 (ref 0.7–3.1)
LYMPHOCYTES NFR BLD AUTO: 31.2 % (ref 19.6–45.3)
MCH RBC QN AUTO: 31.2 PG (ref 26.6–33)
MCHC RBC AUTO-ENTMCNC: 33.1 G/DL (ref 31.5–35.7)
MCV RBC AUTO: 94.4 FL (ref 79–97)
MONOCYTES # BLD AUTO: 0.52 10*3/MM3 (ref 0.1–0.9)
MONOCYTES NFR BLD AUTO: 10 % (ref 5–12)
NEUTROPHILS NFR BLD AUTO: 2.81 10*3/MM3 (ref 1.7–7)
NEUTROPHILS NFR BLD AUTO: 53.8 % (ref 42.7–76)
PLATELET # BLD AUTO: 251 10*3/MM3 (ref 140–450)
PMV BLD AUTO: 9.5 FL (ref 6–12)
POTASSIUM SERPL-SCNC: 4.8 MMOL/L (ref 3.5–5.2)
PROT SERPL-MCNC: 6.9 G/DL (ref 6–8.5)
RBC # BLD AUTO: 4.1 10*6/MM3 (ref 3.77–5.28)
SODIUM SERPL-SCNC: 139 MMOL/L (ref 136–145)
T4 FREE SERPL-MCNC: 1.1 NG/DL (ref 0.93–1.7)
TSH SERPL DL<=0.05 MIU/L-ACNC: 1.37 UIU/ML (ref 0.27–4.2)
WBC NRBC COR # BLD: 5.22 10*3/MM3 (ref 3.4–10.8)

## 2023-11-14 PROCEDURE — 99215 OFFICE O/P EST HI 40 MIN: CPT | Performed by: NURSE PRACTITIONER

## 2023-11-14 PROCEDURE — 86304 IMMUNOASSAY TUMOR CA 125: CPT | Performed by: NURSE PRACTITIONER

## 2023-11-14 PROCEDURE — 1160F RVW MEDS BY RX/DR IN RCRD: CPT | Performed by: NURSE PRACTITIONER

## 2023-11-14 PROCEDURE — 85025 COMPLETE CBC W/AUTO DIFF WBC: CPT | Performed by: OBSTETRICS & GYNECOLOGY

## 2023-11-14 PROCEDURE — 84439 ASSAY OF FREE THYROXINE: CPT | Performed by: OBSTETRICS & GYNECOLOGY

## 2023-11-14 PROCEDURE — 80053 COMPREHEN METABOLIC PANEL: CPT | Performed by: OBSTETRICS & GYNECOLOGY

## 2023-11-14 PROCEDURE — 36415 COLL VENOUS BLD VENIPUNCTURE: CPT

## 2023-11-14 PROCEDURE — 1159F MED LIST DOCD IN RCRD: CPT | Performed by: NURSE PRACTITIONER

## 2023-11-14 PROCEDURE — 1126F AMNT PAIN NOTED NONE PRSNT: CPT | Performed by: NURSE PRACTITIONER

## 2023-11-14 PROCEDURE — 84443 ASSAY THYROID STIM HORMONE: CPT | Performed by: OBSTETRICS & GYNECOLOGY

## 2023-11-14 RX ORDER — OLANZAPINE 5 MG/1
5 TABLET ORAL NIGHTLY
Qty: 4 TABLET | Refills: 5 | Status: SHIPPED | OUTPATIENT
Start: 2023-11-14

## 2023-11-14 RX ORDER — ONDANSETRON HYDROCHLORIDE 8 MG/1
8 TABLET, FILM COATED ORAL 3 TIMES DAILY PRN
Qty: 30 TABLET | Refills: 5 | Status: SHIPPED | OUTPATIENT
Start: 2023-11-14

## 2023-11-14 NOTE — PLAN OF CARE
Outpatient Infusion  [address] [id number]  [phone]      CHEMOTHERAPY EDUCATION    NAME:  Cheryl Storm      : 1950           DATE: 23    Medication Education Sheets: (select all that apply)  Dostarlimab, Carboplatin, and Paclitaxel    Other Education Sheets: (select all that apply)  CINV, Diarrhea, HOPA Immune Checkpoint Inhibitors, Checkpoint Inhibitor Wallet Card, and Symptom Tracker Sheet and NALLELY Information    Chemotherapy Regimen:   OP ENDOMETRIAL Dostarlimab-gxly / CARBOplatin AUC=5 / PACLitaxel IV at the infusion center every 21 days for 6 cycles  *followed by*  Dostarlimab IV at the infusion center every 42 days to complete 1 year, total    TOPICS EDUCATION PROVIDED COMMENTS   ANEMIA:  role of RBC, cause, s/s, ways to manage, role of transfusion [x] Reviewed the role of RBC and the use of transfusions if hemoglobin decreases too much.  Patient to notify us if she experiences shortness of breath, dizziness, or palpitations.  Also let patient know she could feel more tired than usual and to try to stay active, but rest if she needs to.    THROMBOCYTOPENIA:  role of platelet, cause, s/s, ways to prevent bleeding, things to avoid, when to seek help [x] Reviewed the role of platelets in blood clotting and when to call clinic (bloody nose that bleeds for 5 minutes despite pressure, a cut that won't stop bleeding despite pressure, gums that bleed excessively with brushing or flossing). Recommend using a soft bristle toothbrush and blowing the nose gently.    NEUTROPENIA:  role of WBC, cause, infection precautions, s/s of infection, when to call MD [x] Reviewed the role of WBC, good infection prevention practices, and when to call the clinic (temperature 100.4F, sore throat, burning urination, etc).   NUTRITION & APPETITE CHANGES:  importance of maintaining healthy diet & weight, ways to manage to improve intake, dietary consult, exercise regimen,  electrolyte and/or blood glucose abnormalities [x] Metallic Taste: Informed patient of the potential for developing metallic taste and smell. Recommended flavoring water if it tastes metallic, using plastic utensils instead of metal utensils, and avoiding drinking from a metal can or cup to help mitigate this adverse effect.   DIARRHEA:  causes, s/s of dehydration, ways to manage, dietary changes, when to call MD [x] Both: Discussed the risk of diarrhea and immune related colitis. Can use OTC loperamide with diarrhea onset, but call the clinic if 4-6 episodes in 24 hours not relieved by OTC loperamide. Discussed the role of high-dose steroids for the treatment of immune related colitis.    NAUSEA & VOMITING:  cause, use of antiemetics, dietary changes, when to call MD [x] Emetic risk: High  Premeds: Dexamethasone (dual purpose, for infusion reaction prevention as well), Fosaprepitant, and Palonosetron  Scheduled home meds: Olanzapine 5 mg by mouth at night on days 1,2,3,4 after chemotherapy to prevent delayed nausea  PRN home meds: Ondansetron 8mg PO TID PRN N/V  Pharmacy home meds sent to: [facility]    Instructed the patient to take the scheduled anti-nausea medications even if she does not feel nauseous.  I explained this is to prevent delayed nausea.    Instructed the patient to take a dose of the PRN medication at the first onset of nausea and if it's not working to call us for additional medications.  Also provided non-drug measures to mitigate nausea.   MOUTH SORES:  causes, oral care, ways to manage [x] Mouth sores can be prevented by making a mouth wash mixture of salt, baking soda, and water. The patient was instructed to swish and spit four times daily after meals and before bedtime. Also recommended using a soft bristle toothbrush and avoiding alcohol-containing OTC mouthwashes. Note patient has a prescription for PRN Valtrex -- instructed her to speak with the clinic staff before taking this  for any sores that develop during chemotherapy treatment.   ALOPECIA:  cause, ways to manage, resources [x] Discussed the possibility of hair loss with the patient. Informed patient that she could request a prescription for a wig if desired and most of the cost is usually covered by insurance. Recommended covering the head with a hat and/or protecting the skin on the head with SPF 30 or higher.    NERVOUS SYSTEM CHANGES:  causes, s/s, neuropathies, cognitive changes, ways to manage [x] Discussed the adverse effect of peripheral neuropathy and signs/symptoms associated with this adverse effect. Instructed patient to call if symptoms appear.   PAIN:  causes, ways to manage [x] Discussed muscle and joint aches/pains with therapy, and recommended the use of OTC pain relief with ibuprofen or acetaminophen if needed. She was advised to contact the clinic with any signs of severe abdominal pain.   SKIN & NAIL CHANGES:  cause, s/s, ways to manage [x] Both: Discussed the potential for a rash or itchy skin from immunotherapy, offered nonpharmacologic prevention strategies, and instructed the patient to call if a rash develops and worsens. Informed the patient of the possibility for dark or white lines on finger and toenails during treatment, and that nails may become brittle and even fall off in extreme cases. This will likely reverse after treatment concludes.    ORGAN TOXICITIES:  cause, s/s, need for diagnostic tests, labs, when to notify MD [x] Discussed potential effects on organ systems, monitoring, diagnostic tests, labs, and when to notify the clinic. Discussed the signs/symptoms of the following: cardiotoxicity, central neurotoxicity (confusion, vision changes, stiff neck, seizure), eye changes (blurry vision, watery eyes, photophobia, diplopia), hepatotoxicity, hormone gland changes (most commonly the thyroid), lung changes, and nephrotoxicity.   INFUSION RELATED REACTIONS or INJECTION-SITE REACTION:  Cause, s/s,  anaphylaxis, monitoring, etc. [x] Premeds: Dexamethasone (dual purpose, for nausea prevention as well), Diphenhydramine, and Famotidine    Discussed the risk of an infusion reaction and symptoms such as: fever, chills, dizziness, itchiness or rash, flushing, trouble breathing, wheezing, sudden back pain, or feeling faint.  Instructed the patient to notify her nurse if she starts feeling weird at any point during her infusion.    Reviewed how infusion reactions are managed.   MISCELLANEOUS:  drug interactions, administration, labs, etc. [x] Discussed chemotherapy schedule, lab draws, infusion times, and total expected visit time.   DDIs:  Concomitant use of Lexapro and Zyprexa may increase the risk of QTc prolongation and serotonin syndrome. Given short course of Zyprexa, this is not likely clinically significant, but patient was advised to contact the clinic with any signs of palpitations, dizziness, shortness of breath at rest, or mental status changes.  Lab draws: On or before day 1 of each cycle, no sooner than 3 days early.   INFERTILITY & SEXUALITY:    causes, fertility preservation options, sexuality changes, ways to manage, importance of birth control [x] IV Oncology Therapy: Reviewed safe sex practices and the importance of minimizing exposure to body fluids for 48 hours after each dose of IV oncology therapy. The patient is not of childbearing potential s/p TLH/BSO 10/16/23.   HOME CARE:  storing of oral chemo, how to manage bodily fluids [x] IV - Counseled on management of soiled linens and proper flush technique.  Discussed how to manage all the side effects at home and advised when to contact the MD office.     Medications:  Prior to Admission medications    Medication Sig Start Date End Date Taking? Authorizing Provider   acetaminophen (TYLENOL) 325 MG tablet Take 2 tablets by mouth Every 6 (Six) Hours As Needed for Mild Pain. 10/16/23   Latasha Farr MD   docusate sodium 100 MG capsule Take 2  capsules by mouth 2 (Two) Times a Day As Needed for Constipation.  Patient not taking: Reported on 11/3/2023 10/16/23   Latasha Farr MD   dorzolamide-timolol (COSOPT) 2-0.5 % ophthalmic solution Administer 1 drop to both eyes 2 (Two) Times a Day. 10/29/23   Scott Herring MD   escitalopram (LEXAPRO) 20 MG tablet  9/5/23   Scott Herring MD   ibuprofen (ADVIL,MOTRIN) 600 MG tablet Take 1 tablet by mouth Every 6 (Six) Hours As Needed for Mild Pain. 10/16/23   Latasha Farr MD   simvastatin (ZOCOR) 10 MG tablet  9/27/23   Scott Herring MD   valACYclovir (VALTREX) 500 MG tablet  9/5/23   Scott Herring MD   dorzolamide-timolol (COSOPT) 22.3-6.8 MG/ML ophthalmic solution  9/8/23 10/16/23  Scott Herring MD   latanoprost (XALATAN) 0.005 % ophthalmic solution Administer 1 drop to both eyes every night at bedtime. 6/21/23 10/16/23  Scott Herring MD   ondansetron (ZOFRAN) 4 MG tablet Take 1 tablet by mouth Every 6 (Six) Hours As Needed for Nausea or Vomiting. 10/16/23 11/3/23  Latasha Farr MD   oxyCODONE (ROXICODONE) 5 MG immediate release tablet Take 1 tablet by mouth Every 6 (Six) Hours As Needed for Moderate Pain. 10/16/23 11/3/23  Latasha Farr MD   timolol (BETIMOL) 0.25 % ophthalmic solution Apply 1-2 drops to eye(s) as directed by provider Daily.  11/3/23  Scott Herring MD     Notes: All questions and concerns were addressed. Provided a personalized treatment calendar to patient (includes treatment and lab schedule). Provided patient with contact information for the pharmacist and clinic while instructing her to call if any questions or concerns arise. Informed consent for treatment was obtained. Patient was receptive to information and expressed understanding.     Ann Cowden Mayer, Jenn, [facility]  Oncology Clinical Pharmacist  Phone [phone]    11/14/2023  07:57 EST

## 2023-11-14 NOTE — PROGRESS NOTES
CHEMOTHERAPY PREPARATION    Cheryl Storm  [id number]  1950    Subjective   Chief Complaint: Treatment Preparation and Needs Assessment    History of present illness:  Cheryl Storm is a 73 y.o. year old female who is here today for chemotherapy preparation and needs assessment. The patient has been diagnosed with stage III endometrial cancer and is scheduled to begin treatment with carboplatin + paclitaxel + dostarlimab-gxly IV q21 days. Oncology history thus far has been reviewed and is outlined below. She is accompanied by her friend.        Oncology History:    Oncology/Hematology History   Endometrial cancer   8/8/2023 Initial Diagnosis    Patient to Dr. Felicia Botello with c/o postmenopausal bleeding. TVUS showed thickened endometrial stripe. EMB performed. Pathology showed serous endometrial cancer.  Referred to Gyn Oncology.   (Evaluation delayed due to social and insurance issues)     9/29/2023 Cancer Staged    Staging form: Corpus Uteri - Carcinoma And Carcinosarcoma, AJCC 8th Edition  - Clinical stage from 9/29/2023: FIGO Stage I (cT1, cN0, cM0) - Signed by Latasha Farr MD on 9/29/2023     10/12/2023 Imaging    CT chest, abdomen, pelvis:  Irregular bilateral cystic adnexal findings are present, appearing somewhat solid and abnormally enhancing on the right measuring 4.3 cm and multilocular, with a stellate enhancing solid component on the left measuring 7.5 cm. Findings are suspicious for extension of or metastatic involvement from reported primary GYN neoplasm. Correlate with findings on reported upcoming salpingo-oophorectomy. Otherwise no suspicious findings for more distant metastatic involvement in the chest, abdomen and pelvis.     10/16/2023 Surgery    Robotic-assisted total laparoscopic hysterectomy, bilateral salpingo-oophorectomy, sentinel pelvic lymph node dissection, left ureterolysis, and omentectomy by Dr. Latasha Farr at [facility].    Surgical pathology showed high-grade serous  "carcinoma with dedifferentiation (70% dedifferentiated) arising in an endometrial polyp. No myometrial invasion identified. Lymphvascular invasion present, low. Left ovary involved by serous carcinoma (100% serous at the ovary). Left pelvic sentinel node negative. Right pelvic positive for isolated tumor cells.   pT3a, pN0(i+), cM0    Molecular testing:  p53 amplified in both components  ER negative in dedifferentiated component, positive in serous (30%, 2+)  GA focally positive (5%, 2+)  Her2 negative in didifferentiated component, strongly positive (3+) in the serous compenent  PD-L1 <1  MSI intact     10/23/2023 Cancer Staged    Staging form: Corpus Uteri - Carcinoma And Carcinosarcoma, AJCC 8th Edition  - Pathologic stage from 10/23/2023: FIGO Stage IIIA (pT3a, pN0(i+), cM0) - Signed by Kathleen Fung APRN on 11/14/2023 11/17/2023 -  Chemotherapy    OP ENDOMETRIAL Dostarlimab-gxly / CARBOplatin AUC=5 / PACLitaxel         The current medication list and allergy list were reviewed and reconciled.     Past Medical History, Past Surgical History, Social History, Family History have been reviewed and are without significant changes except as mentioned.    Review of Systems   Constitutional: Negative.    Gastrointestinal: Negative.    Genitourinary: Negative.    Psychiatric/Behavioral: Negative.           Objective   Physical Exam  Vital Signs: /65   Pulse 69   Temp 97.5 °F (36.4 °C) (Temporal)   Resp 18   Ht 162.6 cm (64.02\")   Wt 55.2 kg (121 lb 9.6 oz)   LMP  (LMP Unknown)   SpO2 96%   BMI 20.86 kg/m²   Vitals:    11/14/23 0913   PainSc: 0-No pain           General Appearance:  alert, cooperative, no apparent distress, appears stated age, and normal weight   Neurologic/Psych: A&O x 3, gait steady, appropriate affect   HEENT:  Normocephalic, without obvious abnormality, mucous membranes moist   Lungs:   Clear to auscultation bilaterally; respirations regular, even, and unlabored bilaterally "   Heart:  Regular rate and rhythm, no murmurs appreciated   Extremities: Normal, atraumatic; no clubbing, cyanosis, or edema    Skin: No rashes, lesions, or abnormal coloration noted     ECOG Performance Status: 0 - Asymptomatic            NEEDS ASSESSMENTS    Genetics  The patient's new diagnosis and family history have been reviewed for genetic counseling needs. A genetic referral is recommended. GC referral has already been made by Dr. Farr and patient was seen for her consultation on 11/3/2023. She qualified for and opted for genetic testing, results pending.    Molecular Testing  Further molecular testing on tumor is indicated. Sufficient testing has been completed by hospital pathology. Will consider CARIS testing in the future in the event of a recurrence. Due to papillary serous cell type, Ajay testing will be ordered and followed.     Psychosocial  The patient has completed a PHQ-9 Depression Screening and the Distress Thermometer (DT) today.   PHQ-9 Total Score: 2. PHQ-9 results show 1-4 (Minimal Depression). The patient scored their distress today as 0 on a scale of 0-10 with 0 being no distress and 10 being extreme distress.   Problems marked by the patient as being an issue for them within the last week include no problems.   Results were reviewed along with psychosocial resources offered by our cancer center. Our oncology social worker will be flagged for a DT score of 4 or above, and a same day call will be made for a score of 9 or 10. A mental health referral is offered to all patients. The patient is not interested in a referral to [facility] No at this time.   Copies of patient's questionnaires will be scanned into EMR for details and further reference.    Barriers to care  A barriers form was also completed by the patient today. We discussed services offered by our facility to help her have adequate access to care. The patient was given the name and card for our Oncology Social Worker.  "Based upon barriers assessment today, the patient will not require a follow-up call from the  to further discuss needs.   A copy of the barriers form will also be scanned into EMR for details and further reference.     VAD Assessment  The patient and I discussed planned intervenous chemotherapy as well as other IV treatments that are often needed throughout the course of treatment. These may include, but are not limited to blood transfusions, antibiotics, and IV hydration. The vasculature does appear to be adequate for multiple peripheral IVs throughout their treatment course. Discussed risks and benefits of VADs. The patient would not like to pursue Port-A-Cath insertion prior to initiation of treatment.     Advance Care Planning   ACP discussion was held with the patient during this visit. Patient does not have an advance directive, information provided.  The patient and I discussed advanced care planning, \"Conversations that Matter\".   This service was offered, free of charge, for development of advance directives with a certified ACP facilitator.  The patient does not have an up-to-date advanced directive. This document is not on file with our office. The patient is not interested in an appointment with one of our facilitators to create or update their advanced directives.         Palliative Care  The patient and I discussed palliative care services. Palliative care is not the same as Hospice care. This is specialized medical care for people living with serious illness with the goal of improving quality of life for the patient and their family. [facility] has partnered with [facility] to offer our patients outpatient palliative care early along with their treatment to assist in coordination of care, symptom management, pain management, and medical decision making.  Oncology criteria for palliative care referral is met at this time. The patient is not interested in an up-front palliative " care consultation.     Additional Referral needs  none      CHEMOTHERAPY EDUCATION    Booklets Given: Chemotherapy and You []  Eating Hints []    Sexuality/Fertility Books []      Chemotherapy/Biotherapy Education Sheets: (list all that apply)  nausea management, acid reflux management, diarrhea management, Cancer resourse contacts information, skin and mouth care, vaccination information, wig information, and Treatment Calendar                                                                                                                                                                 Chemotherapy Regimen:   Treatment Plans       Name Type Plan Dates Plan Provider         Active    OP ENDOMETRIAL Dostarlimab-gxly / CARBOplatin AUC=5 / PACLitaxel ONCOLOGY TREATMENT  11/16/2023 - Present Latasha Farr MD                      DETAILED CHEMOTHERAPY TEACHING COMPLETED BY PHARMACY. CHEMOTHERAPY CONSENT COMPLETED BY PHARMACY. SEE PHARMACY EDUCATION FOR DOCUMENTATION.     Chemotherapy education comprehension reviewed. Questions answered and additional information discussed on topics including:  Nutrition and appetite changes, Nausea & vomiting, Alopecia, Infertility & sexuality, Organ toxicities, Survivorship, and Home care        Assessment and Plan:    Diagnoses and all orders for this visit:    1. Endometrial cancer (Primary)  -     OLANZapine (zyPREXA) 5 MG tablet; Take 1 tablet by mouth Every Night. Take nightly x 4 starting night of chemotherapy.  Dispense: 4 tablet; Refill: 5  -     ondansetron (ZOFRAN) 8 MG tablet; Take 1 tablet by mouth 3 (Three) Times a Day As Needed for Nausea or Vomiting.  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 This time includes time spent by me in the following activities: preparing for the visit, reviewing tests, counseling and educating the  patient/family/caregiver, ordering medications, tests, or procedures, documenting information in the medical record, and care coordination.     The patient and I have reviewed their new cancer diagnosis and scheduled treatment plan. Needs assessment was completed including genetics, psychosocial needs, barriers to care, VAD evaluation, advanced care planning, and palliative care services. Referrals have been ordered as appropriate based upon our evaluation and patient desires.     Chemotherapy teaching was also completed today as documented above. Adequate time was given to answer all questions to her satisfaction. Patient and family are aware of their care team members and contact information if they have questions or problems throughout the treatment course. Needs assessments and education has been completed. The patient is adequately prepared to begin treatment as scheduled.     Pain assessment was performed today as a part of patient’s care. For patients with pain related to surgery, gynecologic malignancy or cancer treatment, the plan is as noted in the assessment/plan.  For patients with pain not related to these issues, they are to seek any further needed care from a more appropriate provider, such as PCP.      Electronically signed by CLAUDETTE Calvert on 11/14/23 at 08:34 EST.

## 2023-11-15 ENCOUNTER — TELEPHONE (OUTPATIENT)
Dept: GYNECOLOGIC ONCOLOGY | Facility: CLINIC | Age: 73
End: 2023-11-15
Payer: MEDICARE

## 2023-11-16 ENCOUNTER — DOCUMENTATION (OUTPATIENT)
Dept: NUTRITION | Facility: HOSPITAL | Age: 73
End: 2023-11-16
Payer: MEDICARE

## 2023-11-16 ENCOUNTER — TELEPHONE (OUTPATIENT)
Dept: GYNECOLOGIC ONCOLOGY | Facility: CLINIC | Age: 73
End: 2023-11-16
Payer: MEDICARE

## 2023-11-16 NOTE — TELEPHONE ENCOUNTER
RN spoke to patient about her  and her liver enzymes per providers' requests. Patient was told to not take tylenol for now as her liver enzymes are elevated. Patient stated that she was taking extra tylenol due to a botanical plan that she was on. RN told her to stop the tylenol for now. Patient verbalized understanding.  RN also answered questions that were appropriate.

## 2023-11-16 NOTE — TELEPHONE ENCOUNTER
RN called patient to check in and see if she had any recent illness. Patient's liver enzymes are elevated some. RN left a VM for patient to call back. RN will review results with patient when patient returns call.

## 2023-11-16 NOTE — TELEPHONE ENCOUNTER
----- Message from Latasha Farr MD sent at 11/14/2023  5:12 PM EST -----  Please advise patient not to take tylenol.  Her liver enzymes were normal before, but now are elevated. Any recent illness?  Thanks-    ----- Message -----  From: Lab, Background User  Sent: 11/14/2023  10:50 AM EST  To: Latasha Farr MD

## 2023-11-16 NOTE — TELEPHONE ENCOUNTER
----- Message from CLAUDETTE Calvert sent at 11/14/2023  3:34 PM EST -----  Please notify patient CA-125 now 47.7, down from 116 pre-op. We will continue to follow trends with each cycle of treatment. Thanks!

## 2023-11-16 NOTE — PROGRESS NOTES
"Outpatient Oncology Nutrition     Reason for Visit:     Oncology Nutrition Screening and Patient Education    Patient Name:  Cheryl Storm    :  1950    MRN:  [id number]    Date of Encounter: 2023    Nutrition Assessment     Diagnosis:  Stage III serous endometrial cancer     Surgery:  INJECTION OF ICG DYE, TOTAL LAPAROSCOPIC HYSTERECTOMY BILATERAL SALPINGOOPHORECTOMY, SENTINEL PELVIC LYMPH NODE DISSECTION, LEFT URETEROLYSIS, OMENTECTOMY WITH DAVINCI ROBOT performed on 10/16/23     Chemotherapy:  OP ENDOMETRIAL Dostarlimab-gxly / CARBOplatin AUC=5 / PACLitaxel - 30 cycles with start date 23    Radiation: No referral was placed for vaginal brachytherapy as patient is uncertain how she wants to proceed at all at this point     Patient Active Problem List   Diagnosis    Endometrial cancer    Family history of pancreatic cancer       Food / Nutrition Related History       Hydration Status     Goal:  60 ounces    Enteral Feeding     NA  Anthropometric Measurements     Height:    Ht Readings from Last 1 Encounters:   23 162.6 cm (64.02\")       Weight:    Wt Readings from Last 1 Encounters:   23 55.2 kg (121 lb 9.6 oz)       BMI:  20.86 / normal    Weight Change: 3 lbs weight loss past 2  months / no prior weight documentation    Review of Lab Data (Time Frame - 1 month / 2 month)          Component  Ref Range & Units 2 d ago 1 mo ago   Glucose  65 - 99 mg/dL 118 High  105 High    BUN  8 - 23 mg/dL 13 17   Creatinine  0.57 - 1.00 mg/dL 0.61 0.65   Sodium  136 - 145 mmol/L 139 138   Potassium  3.5 - 5.2 mmol/L 4.8 4.8 CM   Comment: Slight hemolysis detected by analyzer. Result may be falsely elevated.   Chloride  98 - 107 mmol/L 102 102   CO2  22.0 - 29.0 mmol/L 27.0 29.0   Calcium  8.6 - 10.5 mg/dL 9.9 9.6   Total Protein  6.0 - 8.5 g/dL 6.9 7.1   Albumin  3.5 - 5.2 g/dL 4.5 4.5   ALT (SGPT)  1 - 33 U/L 130 High  18   AST (SGOT)  1 - 32 U/L 72 High  25   Alkaline Phosphatase  39 - 117 U/L " 141 High  71   Total Bilirubin  0.0 - 1.2 mg/dL 0.3 0.3   Globulin  gm/dL 2.4 2.6 CM   Comment: Calculated Result   A/G Ratio  g/dL 1.9 1.7   BUN/Creatinine Ratio  7.0 - 25.0 21.3 26.2 High    Anion Gap  5.0 - 15.0 mmol/L 10.0 7.0   eGFR  >60.0 mL/min/1.73 95.1 93.7   [facility]        Component  Ref Range & Units 2 d ago 1 mo ago     0.0 - 38.1 U/mL 47.7 High  116.0 High    [facility]ing Agency  [facility]                Medication Review   Reviewed 11/16/23    Nutrition Focused Physical Findings       Nutrition Impact Symptoms   None noted post operatively    Physical Activity      Not my normal self, but able to be up and about with fairly normal activities    Current Nutritional Intake     Oral diet:      Oral nutritional supplements:    Intake:    Malnutrition Risk Assessment     Recent weight loss over the past 6 months:  2 = Unsure    How much weight loss:      Eating poorly because of a decreased appetite:  0 = No    Malnutrition Screening Score:     MST = 0 or 1 Patient not at risk for malnutrition    Nutrition Diagnosis     Problem    Etiology    Signs / Symptoms      Nutrition Intervention   Chart reviewed for nutritional screening; nutrition consultation and education will be provided.    Goal   1. To maintain weight and nutritional status during chemoradiation.  2. Achieve nutrient and hydration goals via oral intake.  3. Provide patient education for management of nutritionally related side effects of treatment and follow up as needed.    Monitoring / Evaluation

## 2023-11-17 ENCOUNTER — HOSPITAL ENCOUNTER (OUTPATIENT)
Dept: ONCOLOGY | Facility: HOSPITAL | Age: 73
Discharge: HOME | End: 2023-11-17
Payer: MEDICARE

## 2023-11-17 ENCOUNTER — TELEPHONE (OUTPATIENT)
Dept: GYNECOLOGIC ONCOLOGY | Facility: CLINIC | Age: 73
End: 2023-11-17

## 2023-11-17 ENCOUNTER — OFFICE VISIT (OUTPATIENT)
Dept: GYNECOLOGIC ONCOLOGY | Facility: CLINIC | Age: 73
End: 2023-11-17
Payer: MEDICARE

## 2023-11-17 VITALS
OXYGEN SATURATION: 100 % | WEIGHT: 121.5 LBS | SYSTOLIC BLOOD PRESSURE: 122 MMHG | HEIGHT: 64 IN | DIASTOLIC BLOOD PRESSURE: 68 MMHG | RESPIRATION RATE: 18 BRPM | TEMPERATURE: 97.3 F | HEART RATE: 63 BPM | BODY MASS INDEX: 20.74 KG/M2

## 2023-11-17 VITALS — DIASTOLIC BLOOD PRESSURE: 56 MMHG | HEART RATE: 56 BPM | SYSTOLIC BLOOD PRESSURE: 97 MMHG

## 2023-11-17 DIAGNOSIS — C54.1 ENDOMETRIAL CANCER: Primary | ICD-10-CM

## 2023-11-17 PROCEDURE — 25810000003 SODIUM CHLORIDE 0.9 % SOLUTION 500 ML FLEX CONT: Performed by: OBSTETRICS & GYNECOLOGY

## 2023-11-17 PROCEDURE — 96413 CHEMO IV INFUSION 1 HR: CPT

## 2023-11-17 PROCEDURE — 25010000002 PALONOSETRON PER 25 MCG: Performed by: OBSTETRICS & GYNECOLOGY

## 2023-11-17 PROCEDURE — 25010000002 DOSTARLIMAB-GXLY 500 MG/10ML SOLUTION 10 ML VIAL: Performed by: OBSTETRICS & GYNECOLOGY

## 2023-11-17 PROCEDURE — 25010000002 FOSAPREPITANT PER 1 MG: Performed by: OBSTETRICS & GYNECOLOGY

## 2023-11-17 PROCEDURE — 96375 TX/PRO/DX INJ NEW DRUG ADDON: CPT

## 2023-11-17 PROCEDURE — 25010000002 DEXAMETHASONE SODIUM PHOSPHATE 100 MG/10ML SOLUTION: Performed by: OBSTETRICS & GYNECOLOGY

## 2023-11-17 PROCEDURE — 25010000002 CARBOPLATIN PER 50 MG: Performed by: OBSTETRICS & GYNECOLOGY

## 2023-11-17 PROCEDURE — 25810000003 SODIUM CHLORIDE 0.9 % SOLUTION 250 ML FLEX CONT: Performed by: OBSTETRICS & GYNECOLOGY

## 2023-11-17 PROCEDURE — 96367 TX/PROPH/DG ADDL SEQ IV INF: CPT

## 2023-11-17 PROCEDURE — 96417 CHEMO IV INFUS EACH ADDL SEQ: CPT

## 2023-11-17 PROCEDURE — 25010000002 PACLITAXEL PER 1 MG: Performed by: OBSTETRICS & GYNECOLOGY

## 2023-11-17 PROCEDURE — 96415 CHEMO IV INFUSION ADDL HR: CPT

## 2023-11-17 PROCEDURE — 25810000003 SODIUM CHLORIDE 0.9 % SOLUTION: Performed by: OBSTETRICS & GYNECOLOGY

## 2023-11-17 RX ORDER — CETIRIZINE HYDROCHLORIDE 10 MG/1
10 TABLET ORAL DAILY
Status: CANCELLED
Start: 2023-11-17

## 2023-11-17 RX ORDER — FAMOTIDINE 10 MG/ML
20 INJECTION, SOLUTION INTRAVENOUS ONCE
Status: COMPLETED | OUTPATIENT
Start: 2023-11-17 | End: 2023-11-17

## 2023-11-17 RX ORDER — PALONOSETRON 0.05 MG/ML
0.25 INJECTION, SOLUTION INTRAVENOUS ONCE
Status: COMPLETED | OUTPATIENT
Start: 2023-11-17 | End: 2023-11-17

## 2023-11-17 RX ORDER — FAMOTIDINE 10 MG/ML
20 INJECTION, SOLUTION INTRAVENOUS ONCE
Status: CANCELLED | OUTPATIENT
Start: 2023-11-17

## 2023-11-17 RX ORDER — SODIUM CHLORIDE 9 MG/ML
20 INJECTION, SOLUTION INTRAVENOUS ONCE
Status: COMPLETED | OUTPATIENT
Start: 2023-11-17 | End: 2023-11-17

## 2023-11-17 RX ORDER — FAMOTIDINE 10 MG/ML
20 INJECTION, SOLUTION INTRAVENOUS AS NEEDED
Status: CANCELLED | OUTPATIENT
Start: 2023-11-17

## 2023-11-17 RX ORDER — DIPHENHYDRAMINE HYDROCHLORIDE 50 MG/ML
50 INJECTION INTRAMUSCULAR; INTRAVENOUS AS NEEDED
Status: CANCELLED | OUTPATIENT
Start: 2023-11-17

## 2023-11-17 RX ORDER — PALONOSETRON 0.05 MG/ML
0.25 INJECTION, SOLUTION INTRAVENOUS ONCE
Status: CANCELLED | OUTPATIENT
Start: 2023-11-17

## 2023-11-17 RX ORDER — SODIUM CHLORIDE 9 MG/ML
20 INJECTION, SOLUTION INTRAVENOUS ONCE
Status: CANCELLED | OUTPATIENT
Start: 2023-11-17

## 2023-11-17 RX ORDER — CETIRIZINE HYDROCHLORIDE 10 MG/1
10 TABLET ORAL ONCE
Status: COMPLETED | OUTPATIENT
Start: 2023-11-17 | End: 2023-11-17

## 2023-11-17 RX ADMIN — SODIUM CHLORIDE 150 MG: 9 INJECTION, SOLUTION INTRAVENOUS at 10:55

## 2023-11-17 RX ADMIN — PACLITAXEL 280 MG: 6 INJECTION, SOLUTION INTRAVENOUS at 11:45

## 2023-11-17 RX ADMIN — SODIUM CHLORIDE 20 ML/HR: 9 INJECTION, SOLUTION INTRAVENOUS at 09:59

## 2023-11-17 RX ADMIN — CETIRIZINE HYDROCHLORIDE 10 MG: 10 TABLET, FILM COATED ORAL at 10:51

## 2023-11-17 RX ADMIN — DEXAMETHASONE SODIUM PHOSPHATE 20 MG: 10 INJECTION, SOLUTION INTRAMUSCULAR; INTRAVENOUS at 10:55

## 2023-11-17 RX ADMIN — SODIUM CHLORIDE 500 MG: 9 INJECTION, SOLUTION INTRAVENOUS at 10:00

## 2023-11-17 RX ADMIN — CARBOPLATIN 440 MG: 10 INJECTION, SOLUTION INTRAVENOUS at 15:27

## 2023-11-17 RX ADMIN — PALONOSETRON HYDROCHLORIDE 0.25 MG: 0.25 INJECTION INTRAVENOUS at 10:51

## 2023-11-17 RX ADMIN — FAMOTIDINE 20 MG: 10 INJECTION, SOLUTION INTRAVENOUS at 10:52

## 2023-11-17 NOTE — PROGRESS NOTES
Cheryl Storm  [id number]  1950      Reason for visit: Endometrial cancer, toxicity assessment, consideration of treatment    History of present illness:  The patient is a 73 y.o. year old female who presents today for treatment and evaluation of the above issues.    Patient presents today for consideration of cycle #1 of combined carboplatin/paclitaxel/dostarlimab-gxly. She underwent chemotherapy education.  She feels well prepared for initiation of treatment.  Labs were reviewed and are adequate to proceed.    Oncologic History:  Oncology/Hematology History   Endometrial cancer   8/8/2023 Initial Diagnosis    Patient to Dr. Felicia Botello with c/o postmenopausal bleeding. TVUS showed thickened endometrial stripe. EMB performed. Pathology showed serous endometrial cancer.  Referred to Gyn Oncology.   (Evaluation delayed due to social and insurance issues)     9/29/2023 Cancer Staged    Staging form: Corpus Uteri - Carcinoma And Carcinosarcoma, AJCC 8th Edition  - Clinical stage from 9/29/2023: FIGO Stage I (cT1, cN0, cM0) - Signed by Latasha Farr MD on 9/29/2023     10/12/2023 Imaging    CT chest, abdomen, pelvis:  Irregular bilateral cystic adnexal findings are present, appearing somewhat solid and abnormally enhancing on the right measuring 4.3 cm and multilocular, with a stellate enhancing solid component on the left measuring 7.5 cm. Findings are suspicious for extension of or metastatic involvement from reported primary GYN neoplasm. Correlate with findings on reported upcoming salpingo-oophorectomy. Otherwise no suspicious findings for more distant metastatic involvement in the chest, abdomen and pelvis.     10/16/2023 Surgery    Robotic-assisted total laparoscopic hysterectomy, bilateral salpingo-oophorectomy, sentinel pelvic lymph node dissection, left ureterolysis, and omentectomy by Dr. Latasha Farr at [facility].    Surgical pathology showed high-grade serous carcinoma with dedifferentiation (70%  dedifferentiated) arising in an endometrial polyp. No myometrial invasion identified. Lymphvascular invasion present, low. Left ovary involved by serous carcinoma (100% serous at the ovary). Left pelvic sentinel node negative. Right pelvic positive for isolated tumor cells.   pT3a, pN0(i+), cM0    Molecular testing:  p53 amplified in both components  ER negative in dedifferentiated component, positive in serous (30%, 2+)  CO focally positive (5%, 2+)  Her2 negative in didifferentiated component, strongly positive (3+) in the serous compenent  PD-L1 <1  MSI intact     10/23/2023 Cancer Staged    Staging form: Corpus Uteri - Carcinoma And Carcinosarcoma, AJCC 8th Edition  - Pathologic stage from 10/23/2023: FIGO Stage IIIA (pT3a, pN0(i+), cM0) - Signed by Kathleen Fung APRN on 11/14/2023 11/17/2023 -  Chemotherapy    OP ENDOMETRIAL Dostarlimab-gxly / CARBOplatin AUC=5 / PACLitaxel           Past Medical History:   Diagnosis Date    Anxiety     Depression     Endometrial cancer 09/29/2023    Heartburn        Past Surgical History:   Procedure Laterality Date    ORIF HUMERUS FRACTURE      20yrs ago    OTHER SURGICAL HISTORY Left     plate placed in great toe    SINUS SURGERY      2022    TOTAL LAPAROSCOPIC HYSTERECTOMY SALPINGO OOPHORECTOMY Bilateral 10/16/2023    Procedure: INJECTION OF ICG DYE, TOTAL LAPAROSCOPIC HYSTERECTOMY BILATERAL SALPINGOOPHORECTOMY, SENTINEL PELVIC LYMPH NODE DISSECTION, LEFT URETEROLYSIS, OMENTECTOMY WITH DAVINCI ROBOT;  Surgeon: Latasha Farr MD;  Location: [facility];  Service: Robotics - DaVinci;  Laterality: Bilateral;       MEDICATIONS:    Current Outpatient Medications:     acetaminophen (TYLENOL) 325 MG tablet, Take 2 tablets by mouth Every 6 (Six) Hours As Needed for Mild Pain., Disp: 60 tablet, Rfl: 0    docusate sodium 100 MG capsule, Take 2 capsules by mouth 2 (Two) Times a Day As Needed for Constipation., Disp: 90 capsule, Rfl: 3    dorzolamide-timolol (COSOPT)  "2-0.5 % ophthalmic solution, Administer 1 drop to both eyes 2 (Two) Times a Day., Disp: , Rfl:     escitalopram (LEXAPRO) 20 MG tablet, , Disp: , Rfl:     ibuprofen (ADVIL,MOTRIN) 600 MG tablet, Take 1 tablet by mouth Every 6 (Six) Hours As Needed for Mild Pain., Disp: 30 tablet, Rfl: 0    OLANZapine (zyPREXA) 5 MG tablet, Take 1 tablet by mouth Every Night. Take nightly x 4 starting night of chemotherapy., Disp: 4 tablet, Rfl: 5    ondansetron (ZOFRAN) 8 MG tablet, Take 1 tablet by mouth 3 (Three) Times a Day As Needed for Nausea or Vomiting., Disp: 30 tablet, Rfl: 5    simvastatin (ZOCOR) 10 MG tablet, , Disp: , Rfl:     valACYclovir (VALTREX) 500 MG tablet, , Disp: , Rfl:      Allergies:  has No Known Allergies.    Social History:   Social History     Socioeconomic History    Marital status: Legally    Tobacco Use    Smoking status: Former     Years: 10     Types: Cigarettes     Passive exposure: Past    Smokeless tobacco: Never    Tobacco comments:     Smoked in college. \"Bummed\" occasionally    Vaping Use    Vaping Use: Never used   Substance and Sexual Activity    Alcohol use: Not Currently     Comment: Stopped drinking alcohol about a year ago    Drug use: Never    Sexual activity: Not Currently     Partners: Male       Family History:    Family History   Problem Relation Age of Onset    Diabetes Father     Pancreatic cancer Father     Cancer Mother     No Known Problems Brother     Diabetes Brother     Pancreatic cancer Brother     No Known Problems Sister     No Known Problems Daughter     Breast cancer Neg Hx         no family hx       Health Maintenance:    Health Maintenance   Topic Date Due    DXA SCAN  Never done    COLORECTAL CANCER SCREENING  Never done    TDAP/TD VACCINES (1 - Tdap) Never done    Pneumococcal Vaccine 65+ (1 - PCV) Never done    MAMMOGRAM  08/31/2018    ZOSTER VACCINE (2 of 2) 02/20/2021    INFLUENZA VACCINE  08/01/2023    COVID-19 Vaccine (3 - 2023-24 season) 09/01/2023    " HEPATITIS C SCREENING  Never done    ANNUAL WELLNESS VISIT  Never done       Review of Systems:  Please refer to history of present illness.  Review of systems otherwise negative.    Physical Exam:  There were no vitals filed for this visit.  There is no height or weight on file to calculate BMI.  Wt Readings from Last 3 Encounters:   11/14/23 55.2 kg (121 lb 9.6 oz)   11/03/23 55.8 kg (123 lb 1.6 oz)   10/16/23 56.2 kg (124 lb)     GENERAL: Alert, well-appearing female appearing her stated age who is in no apparent distress.   HEENT: Sclera anicteric. Head normocephalic, atraumatic. Mucus membranes moist.   NECK: Trachea midline, supple, without masses.  No thyromegaly.   BREASTS: Deferred  CARDIOVASCULAR: Normal rate, regular rhythm, no murmurs, rubs, or gallops.  No peripheral edema.  RESPIRATORY: Clear to auscultation bilaterally, normal respiratory effort  BACK:  No CVA tenderness, no vertebral tenderness on palpation  GASTROINTESTINAL:  Abdomen is soft, non-tender, non-distended, no rebound or guarding, no masses, or hernias. No HSM.  Incisions well-healed  SKIN:  Warm, dry, well-perfused.  All visible areas intact.  No rashes, lesions, ulcers.  PSYCHIATRIC: AO x3, with appropriate affect, normal thought processes.  NEUROLOGIC: No focal deficits.  Moves extremities well.  MUSCULOSKELETAL: Normal gait and station.   EXTREMITIES:   No cyanosis, clubbing, symmetric.  LYMPHATICS:  No cervical or inguinal adenopathy noted.     PELVIC exam: Deferred    ECOG PS 0    PROCEDURES: None    Diagnostic Data:    CT Chest With Contrast Diagnostic    Result Date: 10/12/2023  Impression: Irregular bilateral cystic adnexal findings are present, appearing somewhat solid and abnormally enhancing on the right measuring 4.3 cm and multilocular, with a stellate enhancing solid component on the left measuring 7.5 cm. Findings are suspicious for  extension of or metastatic involvement from reported primary GYN neoplasm. Correlate with  findings on reported upcoming salpingo-oophorectomy. Otherwise no suspicious findings for more distant metastatic involvement in the chest, abdomen and pelvis. Electronically Signed: Yves Franks MD  10/12/2023 11:13 AM EDT  Workstation ID: [id number]    CT Abdomen Pelvis With Contrast    Result Date: 10/12/2023  Impression: Irregular bilateral cystic adnexal findings are present, appearing somewhat solid and abnormally enhancing on the right measuring 4.3 cm and multilocular, with a stellate enhancing solid component on the left measuring 7.5 cm. Findings are suspicious for  extension of or metastatic involvement from reported primary GYN neoplasm. Correlate with findings on reported upcoming salpingo-oophorectomy. Otherwise no suspicious findings for more distant metastatic involvement in the chest, abdomen and pelvis. Electronically Signed: Yves Franks MD  10/12/2023 11:13 AM EDT  Workstation ID: [id number]      Lab Results   Component Value Date    WBC 5.22 11/14/2023    HGB 12.8 11/14/2023    HCT 38.7 11/14/2023    MCV 94.4 11/14/2023     11/14/2023    NEUTROABS 2.81 11/14/2023    GLUCOSE 118 (H) 11/14/2023    BUN 13 11/14/2023    CREATININE 0.61 11/14/2023     11/14/2023    K 4.8 11/14/2023     11/14/2023    CO2 27.0 11/14/2023    CALCIUM 9.9 11/14/2023    ALBUMIN 4.5 11/14/2023    AST 72 (H) 11/14/2023     (H) 11/14/2023    BILITOT 0.3 11/14/2023     Lab Results   Component Value Date     47.7 (H) 11/14/2023     116.0 (H) 09/29/2023         Assessment & Plan   This is a 73 y.o. woman who presents for treatment of endometrial cancer    Encounter Diagnosis   Name Primary?    Endometrial cancer Yes     Stage III due to adnexal involvement.  Patient presents for cycle #1 of combined carboplatin/paclitaxel/dostarlimab for dedifferentiated/serous adenocarcinoma of the endometrium    Pain assessment was performed today as a part of patient’s care.  For patients with pain related  to surgery, gynecologic malignancy or cancer treatment, the plan is as noted in the assessment/plan.  For patients with pain not related to these issues, they are to seek any further needed care from a more appropriate provider, such as PCP.      No orders of the defined types were placed in this encounter.    FOLLOW UP: 3 weeks    I spent 20 minutes caring for Cheryl on this date of service. This time includes time spent by me in the following activities: preparing for the visit, reviewing tests, performing a medically appropriate examination and/or evaluation, counseling and educating the patient/family/caregiver, ordering medications, tests, or procedures, referring and communicating with other health care professionals, documenting information in the medical record, and care coordination    Electronically Signed by: Latasha Farr MD  Date: 11/17/2023

## 2023-11-20 ENCOUNTER — TELEPHONE (OUTPATIENT)
Dept: GENETICS | Facility: HOSPITAL | Age: 73
End: 2023-11-20
Payer: MEDICARE

## 2023-11-21 ENCOUNTER — TELEPHONE (OUTPATIENT)
Dept: GENETICS | Facility: HOSPITAL | Age: 73
End: 2023-11-21
Payer: MEDICARE

## 2023-11-27 NOTE — TELEPHONE ENCOUNTER
Called pt to disclose genetic test results. Pt asked me to call back on Wednesday at 10:30 to go over her results.

## 2023-12-04 ENCOUNTER — TELEPHONE (OUTPATIENT)
Dept: GENETICS | Facility: HOSPITAL | Age: 73
End: 2023-12-04
Payer: MEDICARE

## 2023-12-04 DIAGNOSIS — C54.1 ENDOMETRIAL CANCER: Primary | ICD-10-CM

## 2023-12-04 NOTE — TELEPHONE ENCOUNTER
Spoke with patient and disclosed negative genetic results. Informed patient these results would be on Spectropatht and sent to her Dr. Patient requested a copy be mailed to her.

## 2023-12-04 NOTE — PROGRESS NOTES
Cheryl Short Storm  [id number]  1950      Reason for visit: Endometrial cancer, toxicity assessment, consideration of treatment    History of present illness:  The patient is a 73 y.o. year old female who presents today for treatment and evaluation of the above issues.    Patient presents today for consideration of cycle #2 of combined carboplatin/paclitaxel/dostarlimab-gxly.Today, she is doing well. She denies vaginal bleeding and discharge. She does not have abdominal or pelvic pain. She is tolerating a regular diet and endorses a normal appetite. She denies nausea and vomiting. She denies early satiety and bloating. Denies any CP, SOB, lightheadedness or dizziness. She denies changes in her bowel/bladder. No dysuria, frequency, urgency, hematuria or flank pain. Reports normal energy levels.    Oncologic History:  Oncology/Hematology History   Endometrial cancer   8/8/2023 Initial Diagnosis    Patient to Dr. Felicia Botello with c/o postmenopausal bleeding. TVUS showed thickened endometrial stripe. EMB performed. Pathology showed serous endometrial cancer.  Referred to Gyn Oncology.   (Evaluation delayed due to social and insurance issues)     9/29/2023 Cancer Staged    Staging form: Corpus Uteri - Carcinoma And Carcinosarcoma, AJCC 8th Edition  - Clinical stage from 9/29/2023: FIGO Stage I (cT1, cN0, cM0) - Signed by Latasha Farr MD on 9/29/2023     10/12/2023 Imaging    CT chest, abdomen, pelvis:  Irregular bilateral cystic adnexal findings are present, appearing somewhat solid and abnormally enhancing on the right measuring 4.3 cm and multilocular, with a stellate enhancing solid component on the left measuring 7.5 cm. Findings are suspicious for extension of or metastatic involvement from reported primary GYN neoplasm. Correlate with findings on reported upcoming salpingo-oophorectomy. Otherwise no suspicious findings for more distant metastatic involvement in the chest, abdomen and pelvis.     10/16/2023  Surgery    Robotic-assisted total laparoscopic hysterectomy, bilateral salpingo-oophorectomy, sentinel pelvic lymph node dissection, left ureterolysis, and omentectomy by Dr. Latasha Farr at [facility].    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 No myometrial invasion identified. Lymphvascular invasion present, low. Left ovary involved by serous carcinoma (100% serous at the ovary). Left pelvic sentinel node negative. Right pelvic positive for isolated tumor cells.   pT3a, pN0(i+), cM0    Molecular testing:  p53 amplified in both components  ER negative in dedifferentiated component, positive in serous (30%, 2+)  HI focally positive (5%, 2+)  Her2 negative in didifferentiated component, strongly positive (3+) in the serous compenent  PD-L1 <1  MSI intact     10/23/2023 Cancer Staged    Staging form: Corpus Uteri - Carcinoma And Carcinosarcoma, AJCC 8th Edition  - Pathologic stage from 10/23/2023: FIGO Stage IIIA (pT3a, pN0(i+), cM0) - Signed by Kathleen Fung APRN on 11/14/2023 11/17/2023 -  Chemotherapy    OP ENDOMETRIAL Dostarlimab-gxly / CARBOplatin AUC=5 / PACLitaxel           Past Medical History:   Diagnosis Date    Anxiety     Depression     Endometrial cancer 09/29/2023    Heartburn        Past Surgical History:   Procedure Laterality Date    ORIF HUMERUS FRACTURE      20yrs ago    OTHER SURGICAL HISTORY Left     plate placed in great toe    SINUS SURGERY      2022    TOTAL LAPAROSCOPIC HYSTERECTOMY SALPINGO OOPHORECTOMY Bilateral 10/16/2023    Procedure: INJECTION OF ICG DYE, TOTAL LAPAROSCOPIC HYSTERECTOMY BILATERAL SALPINGOOPHORECTOMY, SENTINEL PELVIC LYMPH NODE DISSECTION, LEFT URETEROLYSIS, OMENTECTOMY WITH DAVINCI ROBOT;  Surgeon: Latasha Farr MD;  Location: [facility] OR;  Service: Robotics - DaVinci;  Laterality: Bilateral;       MEDICATIONS:    Current Outpatient Medications:     acetaminophen  "(TYLENOL) 325 MG tablet, Take 2 tablets by mouth Every 6 (Six) Hours As Needed for Mild Pain., Disp: 60 tablet, Rfl: 0    docusate sodium 100 MG capsule, Take 2 capsules by mouth 2 (Two) Times a Day As Needed for Constipation., Disp: 90 capsule, Rfl: 3    dorzolamide-timolol (COSOPT) 2-0.5 % ophthalmic solution, Administer 1 drop to both eyes 2 (Two) Times a Day., Disp: , Rfl:     escitalopram (LEXAPRO) 20 MG tablet, , Disp: , Rfl:     ibuprofen (ADVIL,MOTRIN) 600 MG tablet, Take 1 tablet by mouth Every 6 (Six) Hours As Needed for Mild Pain., Disp: 30 tablet, Rfl: 0    OLANZapine (zyPREXA) 5 MG tablet, Take 1 tablet by mouth Every Night. Take nightly x 4 starting night of chemotherapy., Disp: 4 tablet, Rfl: 5    ondansetron (ZOFRAN) 8 MG tablet, Take 1 tablet by mouth 3 (Three) Times a Day As Needed for Nausea or Vomiting., Disp: 30 tablet, Rfl: 5    simvastatin (ZOCOR) 10 MG tablet, , Disp: , Rfl:     valACYclovir (VALTREX) 500 MG tablet, , Disp: , Rfl:      Allergies:  has No Known Allergies.    Social History:   Social History     Socioeconomic History    Marital status: Legally    Tobacco Use    Smoking status: Former     Years: 10     Types: Cigarettes     Passive exposure: Past    Smokeless tobacco: Never    Tobacco comments:     Smoked in college. \"Bummed\" occasionally    Vaping Use    Vaping Use: Never used   Substance and Sexual Activity    Alcohol use: Not Currently     Comment: Stopped drinking alcohol about a year ago    Drug use: Never    Sexual activity: Not Currently     Partners: Male       Family History:    Family History   Problem Relation Age of Onset    Diabetes Father     Pancreatic cancer Father     Cancer Mother     No Known Problems Brother     Diabetes Brother     Pancreatic cancer Brother     No Known Problems Sister     No Known Problems Daughter     Breast cancer Neg Hx         no family hx       Health Maintenance:    Health Maintenance   Topic Date Due    DXA SCAN  Never done " "   COLORECTAL CANCER SCREENING  Never done    TDAP/TD VACCINES (1 - Tdap) Never done    Pneumococcal Vaccine 65+ (1 - PCV) Never done    MAMMOGRAM  08/31/2018    ZOSTER VACCINE (2 of 2) 02/20/2021    INFLUENZA VACCINE  08/01/2023    COVID-19 Vaccine (3 - 2023-24 season) 09/01/2023    HEPATITIS C SCREENING  Never done    ANNUAL WELLNESS VISIT  Never done       Review of Systems:  Please refer to history of present illness.  Review of systems otherwise negative.    Physical Exam:  Vitals:    12/07/23 1012   BP: 127/67   Pulse: 87   Resp: 20   Temp: 97.7 °F (36.5 °C)   TempSrc: Temporal   SpO2: 98%   Weight: 53.5 kg (117 lb 14.4 oz)   Height: 162.6 cm (64\")   PainSc: 0-No pain     Body mass index is 20.24 kg/m².  Wt Readings from Last 3 Encounters:   12/07/23 53.5 kg (117 lb 14.4 oz)   11/17/23 55.1 kg (121 lb 8 oz)   11/14/23 55.2 kg (121 lb 9.6 oz)     GENERAL: Alert, well-appearing female appearing her stated age who is in no apparent distress.   HEENT: Sclera anicteric. Head normocephalic, atraumatic. Mucus membranes moist.   NECK: Trachea midline, supple, without masses.  No thyromegaly.   BREASTS: Deferred  CARDIOVASCULAR: Normal rate, regular rhythm, no murmurs, rubs, or gallops.  No peripheral edema.  RESPIRATORY: Clear to auscultation bilaterally, normal respiratory effort  BACK:  No CVA tenderness, no vertebral tenderness on palpation  GASTROINTESTINAL:  Abdomen is soft, non-tender, non-distended, no rebound or guarding, no masses, or hernias. No HSM.  Incisions well-healed  SKIN:  Warm, dry, well-perfused.  All visible areas intact.  No rashes, lesions, ulcers.  PSYCHIATRIC: AO x3, with appropriate affect, normal thought processes.  NEUROLOGIC: No focal deficits.  Moves extremities well.  MUSCULOSKELETAL: Normal gait and station.   EXTREMITIES:   No cyanosis, clubbing, symmetric.  LYMPHATICS:  No cervical or inguinal adenopathy noted.     PELVIC exam: Deferred    ECOG PS 0    PROCEDURES: None    Diagnostic " Data:    CT Chest With Contrast Diagnostic    Result Date: 10/12/2023  Impression: Irregular bilateral cystic adnexal findings are present, appearing somewhat solid and abnormally enhancing on the right measuring 4.3 cm and multilocular, with a stellate enhancing solid component on the left measuring 7.5 cm. Findings are suspicious for  extension of or metastatic involvement from reported primary GYN neoplasm. Correlate with findings on reported upcoming salpingo-oophorectomy. Otherwise no suspicious findings for more distant metastatic involvement in the chest, abdomen and pelvis. Electronically Signed: Yves Franks MD  10/12/2023 11:13 AM EDT  Workstation ID: [id number]    CT Abdomen Pelvis With Contrast    Result Date: 10/12/2023  Impression: Irregular bilateral cystic adnexal findings are present, appearing somewhat solid and abnormally enhancing on the right measuring 4.3 cm and multilocular, with a stellate enhancing solid component on the left measuring 7.5 cm. Findings are suspicious for  extension of or metastatic involvement from reported primary GYN neoplasm. Correlate with findings on reported upcoming salpingo-oophorectomy. Otherwise no suspicious findings for more distant metastatic involvement in the chest, abdomen and pelvis. Electronically Signed: Yves Franks MD  10/12/2023 11:13 AM EDT  Workstation ID: [id number]      Lab Results   Component Value Date    WBC 3.49 12/07/2023    HGB 11.7 (L) 12/07/2023    HCT 34.4 12/07/2023    MCV 92.7 12/07/2023     12/07/2023    NEUTROABS 1.84 12/07/2023    GLUCOSE 110 (H) 12/07/2023    BUN 12 12/07/2023    CREATININE 0.59 12/07/2023     12/07/2023    K 4.5 12/07/2023     12/07/2023    CO2 27.0 12/07/2023    CALCIUM 9.7 12/07/2023    ALBUMIN 4.2 12/07/2023     (H) 12/07/2023     (H) 12/07/2023    BILITOT 0.2 12/07/2023     Lab Results   Component Value Date     47.7 (H) 11/14/2023     116.0 (H) 09/29/2023          Assessment & Plan   This is a 73 y.o. woman who presents for treatment of endometrial cancer    Encounter Diagnoses   Name Primary?    Transaminitis     Examination prior to chemotherapy Yes    Endometrial cancer     Immunotherapy        Stage III due to adnexal involvement.  Patient presents for cycle #2 of combined carboplatin/paclitaxel/dostarlimab for dedifferentiated/serous adenocarcinoma of the endometrium  -Tolerating treatment well with anticipated side effects  -Labs showed elevated AST and ALT >3x ULN but < 10x. Hold dostarlimab this cycle.    Hepatitis  -Grade 2, asymptomatic  -Per ASCO guidelines, we will hold dostarlimab at this time  -Repeat hepatic panel in 5 days. Hepatitis panel ordered to r/o acute heptatitis. CK ordered.  -May need corticosteroids if continues with grade 2 hepatitis      Pain assessment was performed today as a part of patient’s care.  For patients with pain related to surgery, gynecologic malignancy or cancer treatment, the plan is as noted in the assessment/plan.  For patients with pain not related to these issues, they are to seek any further needed care from a more appropriate provider, such as PCP.      Orders Placed This Encounter   Procedures    Hepatic Function Panel     Standing Status:   Standing     Number of Occurrences:   4     Standing Expiration Date:   12/7/2024     Order Specific Question:   Release to patient     Answer:   Routine Release [[id number]]     FOLLOW UP: Infusion of carbo/taxol tomorrow. Repeat labs next week      Electronically Signed by: Janeth Good MD  Date: 12/7/2023

## 2023-12-07 ENCOUNTER — OFFICE VISIT (OUTPATIENT)
Dept: GYNECOLOGIC ONCOLOGY | Facility: CLINIC | Age: 73
End: 2023-12-07
Payer: MEDICARE

## 2023-12-07 ENCOUNTER — DOCUMENTATION (OUTPATIENT)
Dept: GENETICS | Facility: HOSPITAL | Age: 73
End: 2023-12-07
Payer: MEDICARE

## 2023-12-07 ENCOUNTER — TELEPHONE (OUTPATIENT)
Dept: GYNECOLOGIC ONCOLOGY | Facility: CLINIC | Age: 73
End: 2023-12-07

## 2023-12-07 ENCOUNTER — HOSPITAL ENCOUNTER (OUTPATIENT)
Dept: ONCOLOGY | Facility: HOSPITAL | Age: 73
Discharge: HOME | End: 2023-12-07
Admitting: OBSTETRICS & GYNECOLOGY
Payer: MEDICARE

## 2023-12-07 VITALS
HEIGHT: 64 IN | HEART RATE: 87 BPM | OXYGEN SATURATION: 98 % | WEIGHT: 117.9 LBS | SYSTOLIC BLOOD PRESSURE: 127 MMHG | DIASTOLIC BLOOD PRESSURE: 67 MMHG | TEMPERATURE: 97.7 F | RESPIRATION RATE: 20 BRPM | BODY MASS INDEX: 20.13 KG/M2

## 2023-12-07 DIAGNOSIS — C54.1 ENDOMETRIAL CANCER: ICD-10-CM

## 2023-12-07 DIAGNOSIS — R74.01 TRANSAMINITIS: ICD-10-CM

## 2023-12-07 DIAGNOSIS — Z29.89 IMMUNOTHERAPY: ICD-10-CM

## 2023-12-07 DIAGNOSIS — Z01.818 EXAMINATION PRIOR TO CHEMOTHERAPY: Primary | ICD-10-CM

## 2023-12-07 LAB
ALBUMIN SERPL-MCNC: 4.2 G/DL (ref 3.5–5.2)
ALBUMIN SERPL-MCNC: 4.2 G/DL (ref 3.5–5.2)
ALBUMIN/GLOB SERPL: 1.6 G/DL
ALP SERPL-CCNC: 213 U/L (ref 39–117)
ALP SERPL-CCNC: 230 U/L (ref 39–117)
ALT SERPL W P-5'-P-CCNC: 234 U/L (ref 1–33)
ALT SERPL W P-5'-P-CCNC: 240 U/L (ref 1–33)
ANION GAP SERPL CALCULATED.3IONS-SCNC: 9 MMOL/L (ref 5–15)
AST SERPL-CCNC: 152 U/L (ref 1–32)
AST SERPL-CCNC: 158 U/L (ref 1–32)
BASOPHILS # BLD AUTO: 0.03 10*3/MM3 (ref 0–0.2)
BASOPHILS NFR BLD AUTO: 0.9 % (ref 0–1.5)
BILIRUB CONJ SERPL-MCNC: <0.2 MG/DL (ref 0–0.3)
BILIRUB INDIRECT SERPL-MCNC: ABNORMAL MG/DL
BILIRUB SERPL-MCNC: 0.2 MG/DL (ref 0–1.2)
BILIRUB SERPL-MCNC: <0.2 MG/DL (ref 0–1.2)
BUN SERPL-MCNC: 12 MG/DL (ref 8–23)
BUN/CREAT SERPL: 20.3 (ref 7–25)
CALCIUM SPEC-SCNC: 9.7 MG/DL (ref 8.6–10.5)
CANCER AG125 SERPL QL: 39.3 U/ML (ref 0–38.1)
CHLORIDE SERPL-SCNC: 103 MMOL/L (ref 98–107)
CO2 SERPL-SCNC: 27 MMOL/L (ref 22–29)
CREAT SERPL-MCNC: 0.59 MG/DL (ref 0.57–1)
DEPRECATED RDW RBC AUTO: 51.3 FL (ref 37–54)
EGFRCR SERPLBLD CKD-EPI 2021: 95.9 ML/MIN/1.73
EOSINOPHIL # BLD AUTO: 0.06 10*3/MM3 (ref 0–0.4)
EOSINOPHIL NFR BLD AUTO: 1.7 % (ref 0.3–6.2)
ERYTHROCYTE [DISTWIDTH] IN BLOOD BY AUTOMATED COUNT: 14.9 % (ref 12.3–15.4)
GLOBULIN UR ELPH-MCNC: 2.6 GM/DL
GLUCOSE SERPL-MCNC: 110 MG/DL (ref 65–99)
HCT VFR BLD AUTO: 34.4 % (ref 34–46.6)
HGB BLD-MCNC: 11.7 G/DL (ref 12–15.9)
IMM GRANULOCYTES # BLD AUTO: 0 10*3/MM3 (ref 0–0.05)
IMM GRANULOCYTES NFR BLD AUTO: 0 % (ref 0–0.5)
LYMPHOCYTES # BLD AUTO: 0.96 10*3/MM3 (ref 0.7–3.1)
LYMPHOCYTES NFR BLD AUTO: 27.5 % (ref 19.6–45.3)
MCH RBC QN AUTO: 31.5 PG (ref 26.6–33)
MCHC RBC AUTO-ENTMCNC: 34 G/DL (ref 31.5–35.7)
MCV RBC AUTO: 92.7 FL (ref 79–97)
MONOCYTES # BLD AUTO: 0.6 10*3/MM3 (ref 0.1–0.9)
MONOCYTES NFR BLD AUTO: 17.2 % (ref 5–12)
NEUTROPHILS NFR BLD AUTO: 1.84 10*3/MM3 (ref 1.7–7)
NEUTROPHILS NFR BLD AUTO: 52.7 % (ref 42.7–76)
PLATELET # BLD AUTO: 257 10*3/MM3 (ref 140–450)
PMV BLD AUTO: 8.9 FL (ref 6–12)
POTASSIUM SERPL-SCNC: 4.5 MMOL/L (ref 3.5–5.2)
PROT SERPL-MCNC: 6.7 G/DL (ref 6–8.5)
PROT SERPL-MCNC: 6.8 G/DL (ref 6–8.5)
RBC # BLD AUTO: 3.71 10*6/MM3 (ref 3.77–5.28)
SODIUM SERPL-SCNC: 139 MMOL/L (ref 136–145)
T4 FREE SERPL-MCNC: 1.03 NG/DL (ref 0.93–1.7)
TSH SERPL DL<=0.05 MIU/L-ACNC: 1.65 UIU/ML (ref 0.27–4.2)
WBC NRBC COR # BLD AUTO: 3.49 10*3/MM3 (ref 3.4–10.8)

## 2023-12-07 PROCEDURE — 36415 COLL VENOUS BLD VENIPUNCTURE: CPT

## 2023-12-07 PROCEDURE — 82248 BILIRUBIN DIRECT: CPT | Performed by: OBSTETRICS & GYNECOLOGY

## 2023-12-07 PROCEDURE — 86304 IMMUNOASSAY TUMOR CA 125: CPT | Performed by: OBSTETRICS & GYNECOLOGY

## 2023-12-07 PROCEDURE — 84443 ASSAY THYROID STIM HORMONE: CPT | Performed by: OBSTETRICS & GYNECOLOGY

## 2023-12-07 PROCEDURE — 80053 COMPREHEN METABOLIC PANEL: CPT | Performed by: OBSTETRICS & GYNECOLOGY

## 2023-12-07 PROCEDURE — 85025 COMPLETE CBC W/AUTO DIFF WBC: CPT | Performed by: OBSTETRICS & GYNECOLOGY

## 2023-12-07 PROCEDURE — 84439 ASSAY OF FREE THYROXINE: CPT | Performed by: OBSTETRICS & GYNECOLOGY

## 2023-12-07 RX ORDER — FAMOTIDINE 10 MG/ML
20 INJECTION, SOLUTION INTRAVENOUS AS NEEDED
Status: CANCELLED | OUTPATIENT
Start: 2023-12-08

## 2023-12-07 RX ORDER — PALONOSETRON 0.05 MG/ML
0.25 INJECTION, SOLUTION INTRAVENOUS ONCE
Status: CANCELLED | OUTPATIENT
Start: 2023-12-08

## 2023-12-07 RX ORDER — CETIRIZINE HYDROCHLORIDE 10 MG/1
10 TABLET ORAL DAILY
Status: CANCELLED
Start: 2023-12-08

## 2023-12-07 RX ORDER — FAMOTIDINE 10 MG/ML
20 INJECTION, SOLUTION INTRAVENOUS ONCE
Status: CANCELLED | OUTPATIENT
Start: 2023-12-08

## 2023-12-07 RX ORDER — SODIUM CHLORIDE 9 MG/ML
20 INJECTION, SOLUTION INTRAVENOUS ONCE
Status: CANCELLED | OUTPATIENT
Start: 2023-12-08

## 2023-12-07 RX ORDER — DIPHENHYDRAMINE HYDROCHLORIDE 50 MG/ML
50 INJECTION INTRAMUSCULAR; INTRAVENOUS AS NEEDED
Status: CANCELLED | OUTPATIENT
Start: 2023-12-08

## 2023-12-07 NOTE — PROGRESS NOTES
Date of Visit: 2023  Name: Cheryl Storm  : 1950  MRN: [id number]    Referring Provider: Latasha Rodarte MD        REASON FOR CONSULT  Cheryl Storm is a pleasant 73 y.o. female referred for genetic counseling following a recent diagnosis of serous endometrial cancer and due to a family history of colorectal and pancreatic cancer.  Postmenopausal bleeding prompted Ms. Storm to pursue transvaginal ultrasound screening on  2023. 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  Ms Storm recently had a hysterectomy and bilateral salping-oophorectomy performed.  Ms. Storm is discussing chemotherapy and radiation treatment options with her provider.  She has no history of colon polyps and is current with her colonoscopy screenings.  She is current with her breast cancer screenings and has never had a breast biopsy performed.  She began menarche at age 19 and has never has been pregnant.  She began menopause at age 46.  She reported to have used estrogen - HRT for 10 years.  Ms. Storm elected to pursue genetic testing via [facility] CancerNext 36-gene panel with Xi3.  Ms. Storm's genetic test result was NEGATIVE for pathogenic mutations in all 36 - cancer risk genes analyzed.  Ms. Storm's estimated lifetime breast cancer risk is 2.2% as calculated by the Tyrer-Cuzick model.  Ms. Storm was notified of these results via telephone on 2023 .     PERTINENT FAMILY HISTORY:  A cancer-focused four-generation pedigree was obtained via patient reporting     Brother - pancreatic cancer, 70's  Father - pancreatic cancer, 70's  Paternal Grandfather - pancreatic cancer, 80  Mother - bladder cancer, 93  Maternal Uncle 1 - colon cancer, 80  Maternal Uncle 2 - colon cancer - 80  Maternal 1st Cousin ( son of Maternal Uncle 2) - unspecified cancer, age unknown    GENETIC TESTING RESULTS AND RECOMMENDATIONS  Genetic testing was performed by [facility]  analyzing 36-cancer-risk genes.    Genetic testing was negative for pathogenic mutations by sequencing, rearrangement testing, and RNA analysis for the 36 genes on the CancerNext panel.  Most cancer cases (~70%) are classified as sporadic in nature.  This result does not clarify the cause of Ms. Storm cancer diagnosis or her family history of cancer.  Increased cancer risks for Ms. Storm and her family may remain due to her diagnosis and the family history of cancer.  The Tyrer-Cuzick model (BLADIMIR) is a validated female breast cancer risk tool that assesses an unaffected (has not had breast cancer) female's lifetime breast cancer risk.  Tyrer-Cuzick version v8.0b estimates Ms. Clouds lifetime breast cancer risk is 2.2 %.  The Tyrer-Cuzick model also estimates the average 72-y.o. female's lifetime breast cancer risk is 3.8%.  The National Comprehensive Cancer Network guidelines (version 3.2023) recommend the following for 72-y.o. females who have an less than a 20% lifetime risk of breast cancer calculated from validated models such as the Tyrer-Cuzick model:    Annual clinical breast exam  Annual mammogram with tomosynthesis   Practice breast awareness    A negative result does not absolutely rule out a hereditary cause for Ms. Clouds personal or family history of cancer.  There could be a mutation in the family that explains Ms. Clouds family history of cancer that she did not inherit.  There could be a mutation in the family that explains the pattern of cancer that we cannot identify at this time or in a gene that was not tested.  There could be a mutation in one of the genes tested that was not detected. Current genetic testing technologies will identify the majority of mutations with high accuracy, but some remain undetectable at this time.  Ms. Clouds personal and family history of cancer could be occurring not due to a single gene mutation, but due to multiple undetectable genetic and environmental factors that  "we do not fully understand yet.     We encourage patients to reach out over time to inquire about any new methodologies of testing or newly identified cancer risk genes that could explain their personal or family history of cancer.    GENETIC COUNSELING  We reviewed the family history and her personal cancer history information in detail. Cases of cancer follow three general patterns: sporadic, familial, and hereditary.  While most cancer cases are sporadic (~70% of cancer cases), some cases appear to occur in family clusters.  These cases are said to be familial and account for around 20% of cancer cases.  Familial cases may be due to a combination of shared genes and environmental factors among family members that we cannot evaluate via genetic testing at this time.  In 5% - 10% of cancer cases, the risk for cancer is inherited, and the genes responsible for the increased cancer risk are known.       Family histories typical of hereditary cancer syndromes usually include multiple first- and second-degree relatives diagnosed with cancer types that define a syndrome.  These cases tend to be diagnosed at younger-than-expected ages and can be bilateral or multifocal.  The cancer in these families follows an autosomal dominant inheritance pattern, which indicates the likely presence of a mutation in a cancer gene.  Children and siblings of an individual carrying a mutation have a 50% chance of inheriting that mutation, thereby inheriting the increased risk to develop cancer.  However, it is not recommended to pursue genetic testing for adult-onset cancers in children as the results would not have clinical utility until some time in adulthood among other ethical reasons.  These mutations can be passed down from the maternal or the paternal lineage.     We discussed that risk factors or \"red flags\" for hereditary risk include cancers diagnosed at earlier-than-average ages, multiple family members diagnosed with cancers " associated with mutations in the same gene, or multiple generations of associated cancers. Dependent on the specific cancer type or syndrome, there exists the potential for several clinically significant genes related to the cancer's onset or increased risk for development.  Therefore, the standard approach to hereditary cancer genetic testing at this time is via multi-gene panel analyzing several genes associated with a hereditary risk for cancer.  We reviewed the results in the context of the patient's personal and family history to determine what, if any, post-test cancer risk management changes are recommended.     GENETIC TESTING  The risks, benefits, and limitations of genetic testing and implications for clinical management following testing were reviewed.  Genetic test results can influence decisions regarding screening and prevention.  Genetic testing can have significant psychological implications for both individuals and families. Also discussed was the possibility of insurance discrimination based on genetic test results and the laws in place to prevent this, as well as the limitations of these laws.       The Genetic Information Nondiscrimination Act (LISBET) is a federal law enacted in May 2008.  LISBET prohibits discrimination on the basis of genetic information such as genetic test results with respect to health insurance and employment status.  Under Title 1 of LISBET, health insurance companies are prohibited from using an individual's genetic information to change premiums, drop or change an individual's coverage, or prevent coverage from being acquired.  Title 2 of LISBET prohibits employers from using genetic information in employment decisions such as, but not limited to, hiring, firing, promotions, pay, and job assignments.  LISBET protections do not protect against genetic discrimination by life insurance, long-term care or disability insurance,  service members, federal employees, those using  "the [facility], or those employed by a small business with fewer than 15 employees.     We discussed the implications and limitations of a positive, negative, or variant of uncertain significance (VUS) test result.  Variants are termed \"VUS\" when there is not sufficient evidence to classify the variant as a negative (not harmful) variant or a positive (harmful) mutation. Genetic testing labs work to reclassify all VUSs overtime and will issue an updated report when a reclassification occurs.  The majority (over 90%) of VUSs that are reclassified are found to be negative genetic variants, however a small percentage will be upgraded to a harmful mutation. Clinically, a VUS is treated as a negative result.  If genetic testing is negative or a variant of uncertain significance (VUS) is found, management should be guided by a family history-based risk assessment.  Medical care should not be altered based on a VUS result.  Genetic testing for other unaffected (never had cancer) relatives is not recommended for a VUS.     FAMILY CONSIDERATIONS  Genetic testing for Ms. Storm's close family members may be informative despite Ms. Storm's negative test result.  Her family history demonstrates several pancreatic cancer diagnoses and Davalos syndrome-related cancers.  The possibility remains that a familial pathogenic mutation that explains the history of cancers exists, but Ms. Storm did not inherit it. Therefore, genetic counseling and genetic testing is appropriate for her close family members.  All her relatives affected by cancer are , therefore genetic testing for unaffected (never diagnosed with cancer) family members is appropriate.     Family members are encouraged to discuss their family history of cancer and appropriate cancer screening recommendations with their healthcare providers.  Family members are also encouraged to inquire about information regarding genetics and genetic testing, if appropriate, " at a clinic that offers genetic counseling or their healthcare provider.  We would be happy to see relatives in our clinic for genetic counseling and testing.  They can request a referral from their healthcare provider to [facility] and that will prompt a coordinator to call them for an appointment.   For family members who live elsewhere, there are genetic counselors at most [facility] centers.  They can find a genetic counselor by visiting the National Society of Genetic Counselors website at www.nsgc.org or they can call our office and we would be happy to give them the contact information of the closest genetic counselor.        SUMMARY  Ms. Storm's hereditary cancer genetic test identified no pathogenic mutations explaining why her cancer may have developed or her family history of cancer.  The Tyrer-Cuzick model estimates her lifetime breast cancer risk is 2.2%.  It is recommended that Ms. Storm follow her provider's recommendations for her current treatment and for future cancer screening and future risk reduction.  A copy of the genetic test report is attached to this note.      Ms. Storm asked excellent questions during the consult and did not demonstrate any acute psychosocial distress during our visit. This clinic encounter was 15 minutes.     Fly Schwarz MS  Genetic Counselor  [facility]    Cc:  MD Kathleen Langley APRN [facility], PCP

## 2023-12-08 ENCOUNTER — DOCUMENTATION (OUTPATIENT)
Dept: NUTRITION | Facility: HOSPITAL | Age: 73
End: 2023-12-08
Payer: MEDICARE

## 2023-12-08 ENCOUNTER — HOSPITAL ENCOUNTER (OUTPATIENT)
Dept: ONCOLOGY | Facility: HOSPITAL | Age: 73
Discharge: HOME | End: 2023-12-08
Admitting: OBSTETRICS & GYNECOLOGY
Payer: MEDICARE

## 2023-12-08 VITALS
SYSTOLIC BLOOD PRESSURE: 91 MMHG | WEIGHT: 118 LBS | DIASTOLIC BLOOD PRESSURE: 57 MMHG | TEMPERATURE: 97.8 F | RESPIRATION RATE: 16 BRPM | HEART RATE: 52 BPM | BODY MASS INDEX: 20.14 KG/M2 | HEIGHT: 64 IN

## 2023-12-08 DIAGNOSIS — R74.01 TRANSAMINITIS: Primary | ICD-10-CM

## 2023-12-08 DIAGNOSIS — C54.1 ENDOMETRIAL CANCER: Primary | ICD-10-CM

## 2023-12-08 LAB
ALBUMIN SERPL-MCNC: 4 G/DL (ref 3.5–5.2)
ALP SERPL-CCNC: 235 U/L (ref 39–117)
ALT SERPL W P-5'-P-CCNC: 193 U/L (ref 1–33)
AST SERPL-CCNC: 122 U/L (ref 1–32)
BILIRUB CONJ SERPL-MCNC: <0.2 MG/DL (ref 0–0.3)
BILIRUB INDIRECT SERPL-MCNC: ABNORMAL MG/DL
BILIRUB SERPL-MCNC: 0.2 MG/DL (ref 0–1.2)
HAV IGM SERPL QL IA: NORMAL
HBV CORE IGM SERPL QL IA: NORMAL
HBV SURFACE AG SERPL QL IA: NORMAL
HCV AB SER DONR QL: NORMAL
PROT SERPL-MCNC: 6.4 G/DL (ref 6–8.5)

## 2023-12-08 PROCEDURE — 25810000003 SODIUM CHLORIDE 0.9 % SOLUTION 500 ML FLEX CONT: Performed by: OBSTETRICS & GYNECOLOGY

## 2023-12-08 PROCEDURE — 25010000002 PALONOSETRON PER 25 MCG: Performed by: OBSTETRICS & GYNECOLOGY

## 2023-12-08 PROCEDURE — 96413 CHEMO IV INFUSION 1 HR: CPT

## 2023-12-08 PROCEDURE — 25010000002 CARBOPLATIN PER 50 MG: Performed by: OBSTETRICS & GYNECOLOGY

## 2023-12-08 PROCEDURE — 25010000002 FOSAPREPITANT PER 1 MG: Performed by: OBSTETRICS & GYNECOLOGY

## 2023-12-08 PROCEDURE — 96375 TX/PRO/DX INJ NEW DRUG ADDON: CPT

## 2023-12-08 PROCEDURE — 96415 CHEMO IV INFUSION ADDL HR: CPT

## 2023-12-08 PROCEDURE — 96367 TX/PROPH/DG ADDL SEQ IV INF: CPT

## 2023-12-08 PROCEDURE — 25810000003 SODIUM CHLORIDE 0.9 % SOLUTION 250 ML FLEX CONT: Performed by: OBSTETRICS & GYNECOLOGY

## 2023-12-08 PROCEDURE — 80076 HEPATIC FUNCTION PANEL: CPT | Performed by: OBSTETRICS & GYNECOLOGY

## 2023-12-08 PROCEDURE — 96374 THER/PROPH/DIAG INJ IV PUSH: CPT

## 2023-12-08 PROCEDURE — 25810000003 SODIUM CHLORIDE 0.9 % SOLUTION: Performed by: OBSTETRICS & GYNECOLOGY

## 2023-12-08 PROCEDURE — 80074 ACUTE HEPATITIS PANEL: CPT | Performed by: OBSTETRICS & GYNECOLOGY

## 2023-12-08 PROCEDURE — 96417 CHEMO IV INFUS EACH ADDL SEQ: CPT

## 2023-12-08 PROCEDURE — 25010000002 DEXAMETHASONE SODIUM PHOSPHATE 100 MG/10ML SOLUTION: Performed by: OBSTETRICS & GYNECOLOGY

## 2023-12-08 PROCEDURE — 25010000002 PACLITAXEL PER 1 MG: Performed by: OBSTETRICS & GYNECOLOGY

## 2023-12-08 RX ORDER — FAMOTIDINE 10 MG/ML
20 INJECTION, SOLUTION INTRAVENOUS ONCE
Status: COMPLETED | OUTPATIENT
Start: 2023-12-08 | End: 2023-12-08

## 2023-12-08 RX ORDER — SODIUM CHLORIDE 9 MG/ML
20 INJECTION, SOLUTION INTRAVENOUS ONCE
Status: COMPLETED | OUTPATIENT
Start: 2023-12-08 | End: 2023-12-08

## 2023-12-08 RX ORDER — PALONOSETRON 0.05 MG/ML
0.25 INJECTION, SOLUTION INTRAVENOUS ONCE
Status: COMPLETED | OUTPATIENT
Start: 2023-12-08 | End: 2023-12-08

## 2023-12-08 RX ORDER — CETIRIZINE HYDROCHLORIDE 10 MG/1
10 TABLET ORAL DAILY
Status: DISCONTINUED | OUTPATIENT
Start: 2023-12-08 | End: 2023-12-09 | Stop reason: HOSPADM

## 2023-12-08 RX ADMIN — SODIUM CHLORIDE 150 MG: 9 INJECTION, SOLUTION INTRAVENOUS at 09:29

## 2023-12-08 RX ADMIN — DEXAMETHASONE SODIUM PHOSPHATE 20 MG: 10 INJECTION, SOLUTION INTRAMUSCULAR; INTRAVENOUS at 09:31

## 2023-12-08 RX ADMIN — CARBOPLATIN 430 MG: 10 INJECTION, SOLUTION INTRAVENOUS at 14:30

## 2023-12-08 RX ADMIN — FAMOTIDINE 20 MG: 10 INJECTION, SOLUTION INTRAVENOUS at 09:31

## 2023-12-08 RX ADMIN — SODIUM CHLORIDE 20 ML/HR: 9 INJECTION, SOLUTION INTRAVENOUS at 09:29

## 2023-12-08 RX ADMIN — PACLITAXEL 280 MG: 6 INJECTION, SOLUTION INTRAVENOUS at 10:40

## 2023-12-08 RX ADMIN — CETIRIZINE HYDROCHLORIDE 10 MG: 10 TABLET, FILM COATED ORAL at 09:27

## 2023-12-08 RX ADMIN — PALONOSETRON 0.25 MG: 0.05 INJECTION, SOLUTION INTRAVENOUS at 09:28

## 2023-12-08 NOTE — PROGRESS NOTES
ONC Nutrition    Diagnosis:  Stage III serous endometrial cancer      Surgery:  INJECTION OF ICG DYE, TOTAL LAPAROSCOPIC HYSTERECTOMY BILATERAL SALPINGOOPHORECTOMY, SENTINEL PELVIC LYMPH NODE DISSECTION, LEFT URETEROLYSIS, OMENTECTOMY WITH DAVINCI ROBOT performed on 10/16/23      Chemotherapy:  OP ENDOMETRIAL Dostarlimab-gxly / CARBOplatin AUC=5 / PACLitaxel - 30 cycles with start date 11/17/23     Radiation: No referral was placed for vaginal brachytherapy as patient is uncertain how she wants to proceed at all at this point     Weight 118 lbs /  6 lbs weight loss past 2 1/2 months    Nutritional consultation with patient and friend who accompanies her to treatment today.  Patient lives alone, but has a strong support system.  Her PCP is Dr. Brijesh Choi / [facility].  In recent months since diagnosis, she has made significant changes in her diet eliminating sugar, caffeine, alcohol, white and wheat flour, white foods which is probably a contributing factor to the weight loss she is experiencing.  She takes a multitude of OTC supplements that have been prescribed by PCP.    Discussed the importance of nutrition with diagnosis and treatment plan, focusing on adequate calorie, protein, nutrient and hydration. Encouraged focus on healthy food choices that would support immune system and nutritional status.   Discussed indication for higher protein intake and reviewed high protein foods with encouragement to include at each meal.      Discussed the possible side effects of treatment and tips for nutritional management including N/V, constipation, diarrhea, fatigue, appetite and taste changes.   Reviewed modification of the diet for managing treatment side effects.       Discussed food safety and cautioned regarding possible drug interaction with chemo / toxicity with OTC. supplements.    Will continue to follow as indicated.

## 2023-12-11 ENCOUNTER — TELEPHONE (OUTPATIENT)
Dept: ONCOLOGY | Facility: CLINIC | Age: 73
End: 2023-12-11
Payer: MEDICARE

## 2023-12-12 ENCOUNTER — TELEPHONE (OUTPATIENT)
Dept: ONCOLOGY | Facility: CLINIC | Age: 73
End: 2023-12-12
Payer: MEDICARE

## 2023-12-12 NOTE — TELEPHONE ENCOUNTER
Attempts x4 to contact to give Provider comments for Lab results with messages left with office number to contact.

## 2023-12-12 NOTE — TELEPHONE ENCOUNTER
Attempts x4 to contact Patient for Providers request to communicate Labs with no success, office number provided on vm.

## 2023-12-12 NOTE — TELEPHONE ENCOUNTER
----- Message from Janeth Good MD sent at 12/7/2023  2:33 PM EST -----  Please let Cheryl Storm know that her CA-125 is low and normal.

## 2023-12-15 LAB
CYTO UR: NORMAL
LAB AP CASE REPORT: NORMAL
LAB AP CLINICAL INFORMATION: NORMAL
LAB AP DIAGNOSIS COMMENT: NORMAL
LAB AP SPECIAL STAINS: NORMAL
Lab: NORMAL
Lab: NORMAL
PATH REPORT.ADDENDUM SPEC: NORMAL
PATH REPORT.FINAL DX SPEC: NORMAL
PATH REPORT.GROSS SPEC: NORMAL

## 2023-12-19 ENCOUNTER — TELEPHONE (OUTPATIENT)
Dept: GYNECOLOGIC ONCOLOGY | Facility: CLINIC | Age: 73
End: 2023-12-19
Payer: MEDICARE

## 2023-12-19 NOTE — TELEPHONE ENCOUNTER
Patient phoned. States she had a call from Rosalina but she needs a little more of an explanation. Please advise.

## 2023-12-28 ENCOUNTER — OFFICE VISIT (OUTPATIENT)
Dept: GYNECOLOGIC ONCOLOGY | Facility: CLINIC | Age: 73
End: 2023-12-28
Payer: MEDICARE

## 2023-12-28 ENCOUNTER — TELEPHONE (OUTPATIENT)
Dept: GYNECOLOGIC ONCOLOGY | Facility: CLINIC | Age: 73
End: 2023-12-28

## 2023-12-28 ENCOUNTER — HOSPITAL ENCOUNTER (OUTPATIENT)
Dept: ONCOLOGY | Facility: HOSPITAL | Age: 73
Discharge: HOME | End: 2023-12-28
Admitting: OBSTETRICS & GYNECOLOGY
Payer: MEDICARE

## 2023-12-28 VITALS
BODY MASS INDEX: 20.25 KG/M2 | HEIGHT: 64 IN | TEMPERATURE: 97.8 F | DIASTOLIC BLOOD PRESSURE: 59 MMHG | OXYGEN SATURATION: 97 % | RESPIRATION RATE: 18 BRPM | WEIGHT: 118.6 LBS | SYSTOLIC BLOOD PRESSURE: 115 MMHG | HEART RATE: 76 BPM

## 2023-12-28 DIAGNOSIS — Z09 CHEMOTHERAPY FOLLOW-UP EXAMINATION: ICD-10-CM

## 2023-12-28 DIAGNOSIS — C54.1 ENDOMETRIAL CANCER: Primary | ICD-10-CM

## 2023-12-28 DIAGNOSIS — D70.1 CHEMOTHERAPY-INDUCED NEUTROPENIA: ICD-10-CM

## 2023-12-28 DIAGNOSIS — R21 RASH AND NONSPECIFIC SKIN ERUPTION: ICD-10-CM

## 2023-12-28 DIAGNOSIS — R74.01 TRANSAMINITIS: ICD-10-CM

## 2023-12-28 DIAGNOSIS — Z29.89 IMMUNOTHERAPY: ICD-10-CM

## 2023-12-28 DIAGNOSIS — T45.1X5A CHEMOTHERAPY-INDUCED NEUTROPENIA: ICD-10-CM

## 2023-12-28 LAB
ALBUMIN SERPL-MCNC: 4.3 G/DL (ref 3.5–5.2)
ALBUMIN SERPL-MCNC: 4.4 G/DL (ref 3.5–5.2)
ALBUMIN/GLOB SERPL: 2.1 G/DL
ALP SERPL-CCNC: 186 U/L (ref 39–117)
ALP SERPL-CCNC: 194 U/L (ref 39–117)
ALT SERPL W P-5'-P-CCNC: 75 U/L (ref 1–33)
ALT SERPL W P-5'-P-CCNC: 78 U/L (ref 1–33)
ANION GAP SERPL CALCULATED.3IONS-SCNC: 10 MMOL/L (ref 5–15)
AST SERPL-CCNC: 49 U/L (ref 1–32)
AST SERPL-CCNC: 51 U/L (ref 1–32)
BASOPHILS # BLD AUTO: 0.04 10*3/MM3 (ref 0–0.2)
BASOPHILS NFR BLD AUTO: 1.4 % (ref 0–1.5)
BILIRUB CONJ SERPL-MCNC: <0.2 MG/DL (ref 0–0.3)
BILIRUB CONJ SERPL-MCNC: <0.2 MG/DL (ref 0–0.3)
BILIRUB INDIRECT SERPL-MCNC: ABNORMAL MG/DL
BILIRUB SERPL-MCNC: 0.3 MG/DL (ref 0–1.2)
BILIRUB SERPL-MCNC: 0.3 MG/DL (ref 0–1.2)
BUN SERPL-MCNC: 19 MG/DL (ref 8–23)
BUN/CREAT SERPL: 35.2 (ref 7–25)
CALCIUM SPEC-SCNC: 9.9 MG/DL (ref 8.6–10.5)
CANCER AG125 SERPL QL: 31.7 U/ML (ref 0–38.1)
CHLORIDE SERPL-SCNC: 104 MMOL/L (ref 98–107)
CO2 SERPL-SCNC: 26 MMOL/L (ref 22–29)
CREAT SERPL-MCNC: 0.54 MG/DL (ref 0.57–1)
DEPRECATED RDW RBC AUTO: 57.5 FL (ref 37–54)
EGFRCR SERPLBLD CKD-EPI 2021: 98 ML/MIN/1.73
EOSINOPHIL # BLD AUTO: 0.03 10*3/MM3 (ref 0–0.4)
EOSINOPHIL NFR BLD AUTO: 1.1 % (ref 0.3–6.2)
ERYTHROCYTE [DISTWIDTH] IN BLOOD BY AUTOMATED COUNT: 17.1 % (ref 12.3–15.4)
GLOBULIN UR ELPH-MCNC: 2.1 GM/DL
GLUCOSE SERPL-MCNC: 102 MG/DL (ref 65–99)
HCT VFR BLD AUTO: 31.7 % (ref 34–46.6)
HGB BLD-MCNC: 10.7 G/DL (ref 12–15.9)
IMM GRANULOCYTES # BLD AUTO: 0 10*3/MM3 (ref 0–0.05)
IMM GRANULOCYTES NFR BLD AUTO: 0 % (ref 0–0.5)
LYMPHOCYTES # BLD AUTO: 1.03 10*3/MM3 (ref 0.7–3.1)
LYMPHOCYTES NFR BLD AUTO: 36.1 % (ref 19.6–45.3)
MCH RBC QN AUTO: 31.8 PG (ref 26.6–33)
MCHC RBC AUTO-ENTMCNC: 33.8 G/DL (ref 31.5–35.7)
MCV RBC AUTO: 94.1 FL (ref 79–97)
MONOCYTES # BLD AUTO: 0.49 10*3/MM3 (ref 0.1–0.9)
MONOCYTES NFR BLD AUTO: 17.2 % (ref 5–12)
NEUTROPHILS NFR BLD AUTO: 1.26 10*3/MM3 (ref 1.7–7)
NEUTROPHILS NFR BLD AUTO: 44.2 % (ref 42.7–76)
PLATELET # BLD AUTO: 284 10*3/MM3 (ref 140–450)
PMV BLD AUTO: 8.7 FL (ref 6–12)
POTASSIUM SERPL-SCNC: 5.1 MMOL/L (ref 3.5–5.2)
PROT SERPL-MCNC: 6.4 G/DL (ref 6–8.5)
PROT SERPL-MCNC: 6.5 G/DL (ref 6–8.5)
RBC # BLD AUTO: 3.37 10*6/MM3 (ref 3.77–5.28)
SODIUM SERPL-SCNC: 140 MMOL/L (ref 136–145)
T4 FREE SERPL-MCNC: 1.28 NG/DL (ref 0.93–1.7)
TSH SERPL DL<=0.05 MIU/L-ACNC: 0.65 UIU/ML (ref 0.27–4.2)
WBC NRBC COR # BLD AUTO: 2.85 10*3/MM3 (ref 3.4–10.8)

## 2023-12-28 PROCEDURE — 84443 ASSAY THYROID STIM HORMONE: CPT | Performed by: OBSTETRICS & GYNECOLOGY

## 2023-12-28 PROCEDURE — 36415 COLL VENOUS BLD VENIPUNCTURE: CPT

## 2023-12-28 PROCEDURE — 80053 COMPREHEN METABOLIC PANEL: CPT | Performed by: OBSTETRICS & GYNECOLOGY

## 2023-12-28 PROCEDURE — 82248 BILIRUBIN DIRECT: CPT | Performed by: OBSTETRICS & GYNECOLOGY

## 2023-12-28 PROCEDURE — 86304 IMMUNOASSAY TUMOR CA 125: CPT | Performed by: OBSTETRICS & GYNECOLOGY

## 2023-12-28 PROCEDURE — 85025 COMPLETE CBC W/AUTO DIFF WBC: CPT | Performed by: OBSTETRICS & GYNECOLOGY

## 2023-12-28 PROCEDURE — 84439 ASSAY OF FREE THYROXINE: CPT | Performed by: OBSTETRICS & GYNECOLOGY

## 2023-12-28 NOTE — PROGRESS NOTES
GYN ONCOLOGY FOLLOW-UP    Cheryl Storm  [id number]  1950    Subjective   Chief Complaint: Uterine Cancer, Chemotherapy, and Immunotherapy        History of present illness:       Cheryl Storm is a 73 y.o. year old female who is here today for advanced endometrial cancer, treatment toxicity assessment, and consideration of ongoing chemotherapy and immunotherapy. She is scheduled for cycle #3 carboplatin + paclitaxel + dostarlimab-gxly tomorrow, pending evaluation and labs. Genetic testing completed and returned negative. Treatment thus far has been complicated by elevated AST/ALT leading to hold of dostarlimab at cycle #2. Hepatitis panel negative and hepatic function panel is being followed.   She is feeling generally well today. Denies vaginal bleeding and discharge. Denies abdominal or pelvic pain. Appetite is normal and she is tolerating a normal diet. Bowels and bladder are normal for her, occasional constipation managed with Metamucil and increased water intake. Patient denies diarrhea or cough. She notes a few itchy red spots scattered throughout skin over the last few weeks, very mild, treating with OTC topical ointments.       Oncology History:    Oncology/Hematology History   Endometrial cancer   8/8/2023 Initial Diagnosis    Patient to Dr. Felicia Botello with c/o postmenopausal bleeding. TVUS showed thickened endometrial stripe. EMB performed. Pathology showed serous endometrial cancer.  Referred to Gyn Oncology.   (Evaluation delayed due to social and insurance issues)     9/29/2023 Cancer Staged    Staging form: Corpus Uteri - Carcinoma And Carcinosarcoma, AJCC 8th Edition  - Clinical stage from 9/29/2023: FIGO Stage I (cT1, cN0, cM0) - Signed by Latasha Farr MD on 9/29/2023     10/12/2023 Imaging    CT chest, abdomen, pelvis:  Irregular bilateral cystic adnexal findings are present, appearing somewhat solid and abnormally enhancing on the right measuring 4.3 cm and multilocular, with a  stellate enhancing solid component on the left measuring 7.5 cm. Findings are suspicious for extension of or metastatic involvement from reported primary GYN neoplasm. Correlate with findings on reported upcoming salpingo-oophorectomy. Otherwise no suspicious findings for more distant metastatic involvement in the chest, abdomen and pelvis.     10/16/2023 Surgery    Robotic-assisted total laparoscopic hysterectomy, bilateral salpingo-oophorectomy, sentinel pelvic lymph node dissection, left ureterolysis, and omentectomy by Dr. Latasha Farr at [facility].    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 No myometrial invasion identified. Lymphvascular invasion present, low. Left ovary involved by serous carcinoma (100% serous at the ovary). Left pelvic sentinel node negative. Right pelvic positive for isolated tumor cells.   pT3a, pN0(i+), cM0    Molecular testing:  p53 amplified in both components  ER negative in dedifferentiated component, positive in serous (30%, 2+)  PA focally positive (5%, 2+)  Her2 negative in didifferentiated component, strongly positive (3+) in the serous compenent  PD-L1 <1  MSI intact     10/23/2023 Cancer Staged    Staging form: Corpus Uteri - Carcinoma And Carcinosarcoma, AJCC 8th Edition  - Pathologic stage from 10/23/2023: FIGO Stage IIIA (pT3a, pN0(i+), cM0) - Signed by Kathleen Fung APRN on 11/14/2023 11/17/2023 -  Chemotherapy    OP ENDOMETRIAL Dostarlimab-gxly / CARBOplatin AUC=5 / PACLitaxel  - dostarlimab held at cycle #2 due to transaminitis  - cycle #3 delayed 1 week due to neutropenia     12/7/2023 Genetic Testing    Genetic testing NEGATIVE for pathogenic mutations in BRCA 1/2 and 34 other genes via [employer] panel.          The current medication list and allergy list were reviewed and reconciled.     Past Medical History, Past Surgical History, Social History, Family History  "have been reviewed and are without significant changes except as mentioned.    Review of Systems   Constitutional: Negative.    Gastrointestinal:  Positive for constipation.   Genitourinary: Negative.    Skin:  Positive for rash (few red itchy spots to arms/abdomen).   Psychiatric/Behavioral: Negative.           Objective   Physical Exam  Vital Signs: /59   Pulse 76   Temp 97.8 °F (36.6 °C) (Temporal)   Resp 18   Ht 162.6 cm (64\")   Wt 53.8 kg (118 lb 9.6 oz)   LMP  (LMP Unknown)   SpO2 97%   BMI 20.36 kg/m²   Wt Readings from Last 3 Encounters:   12/28/23 53.8 kg (118 lb 9.6 oz)   12/08/23 53.5 kg (118 lb)   12/07/23 53.5 kg (117 lb 14.4 oz)     Vitals:    12/28/23 1511   PainSc: 0-No pain           General Appearance:  alert, cooperative, no apparent distress, appears stated age, and normal weight   Neurologic/Psych: A&O x 3, gait steady, appropriate affect   Lungs:   Clear to auscultation bilaterally; respirations regular, even, and unlabored bilaterally   Heart:  Regular rate and rhythm, no murmurs appreciated   Skin: No lesions, rashes, or discoloration   Extremities: Normal, atraumatic; no clubbing, cyanosis, or edema    Pelvic: deferred     ECOG score: 0             Result Review :   The following data was reviewed by: CLAUDETTE Calvert on 12/28/2023:  CMP          12/7/2023    09:54 12/8/2023    08:48 12/28/2023    14:51   CMP   Glucose 110   102    BUN 12   19    Creatinine 0.59   0.54    EGFR 95.9   98.0    Sodium 139   140    Potassium 4.5   5.1    Chloride 103   104    Calcium 9.7   9.9    Total Protein 6.8     6.7  6.4  6.5     6.4    Albumin 4.2     4.2  4.0  4.4     4.3    Globulin 2.6   2.1    Total Bilirubin 0.2     <0.2  0.2  0.3     0.3    Alkaline Phosphatase 230     213  235  194     186    AST (SGOT) 152     158  122  49     51    ALT (SGPT) 234     240  193  75     78    Albumin/Globulin Ratio 1.6   2.1    BUN/Creatinine Ratio 20.3   35.2    Anion Gap 9.0   10.0      CBC " w/diff          11/14/2023    10:34 12/7/2023    09:54 12/28/2023    14:51   CBC w/Diff   WBC 5.22  3.49  2.85    RBC 4.10  3.71  3.37    Hemoglobin 12.8  11.7  10.7    Hematocrit 38.7  34.4  31.7    MCV 94.4  92.7  94.1    MCH 31.2  31.5  31.8    MCHC 33.1  34.0  33.8    RDW 13.2  14.9  17.1    Platelets 251  257  284    Neutrophil Rel % 53.8  52.7  44.2    Immature Granulocyte Rel % 0.0  0.0  0.0    Lymphocyte Rel % 31.2  27.5  36.1    Monocyte Rel % 10.0  17.2  17.2    Eosinophil Rel % 4.4  1.7  1.1    Basophil Rel % 0.6  0.9  1.4      Component      Latest Ref Rng 9/29/2023 11/14/2023 12/7/2023 12/28/2023   Neutrophils Absolute      1.70 - 7.00 10*3/mm3 3.12  2.81  1.84  1.26 (L)         TSH          11/14/2023    10:34 12/7/2023    09:54 12/28/2023    14:51   TSH   TSH 1.370  1.650  0.650      Last imaging study was CT chest, abdomen, pelvis 10/12/2023.   Tumor marker:     Date Value Ref Range Status   12/28/2023 31.7 0.0 - 38.1 U/mL Final   12/07/2023 39.3 (H) 0.0 - 38.1 U/mL Final   11/14/2023 47.7 (H) 0.0 - 38.1 U/mL Final   09/29/2023 116.0 (H) 0.0 - 38.1 U/mL Final         Procedure Notes:              Assessment and Plan:    Diagnoses and all orders for this visit:    1. Endometrial cancer (Primary)    2. Chemotherapy follow-up examination    3. Immunotherapy    4. Transaminitis  -     Hepatic Function Panel; Standing    5. Chemotherapy-induced neutropenia    6. Rash and nonspecific skin eruption        This is a 73 y.o. woman with Stage IIIA serous endometrial cancer, currently undergoing combination chemotherapy and immunotherapy with carboplatin/paclitaxel/dostarlimab-gxly    Stage III due to adnexal involvement:  -Patient presents for cycle #3 of combined carboplatin/paclitaxel/dostarlimab for dedifferentiated/serous adenocarcinoma of the endometrium  -Tolerating treatment well with anticipated side effects  -dostarlimab hold with cycle #2 due to elevated AST and ALT >3x ULN but < 10x  -labs  today show neutropenia with WBC = 2.85 and ANC = 1.26, below ANC hold parameter. No treatment tomorrow. Hold x 1 week. Repeat CBC and CMP in 1 week for reconsideration of cycle #3    Chemotherapy-induced neutropenia:  -labs as above. Hold treatment x 1 week  -no signs or symptoms of acute infection. Neutropenic precautions given  -continue to monitor closely    Transaminitis  -Grade 2, asymptomatic noted at cycle 2  -Per ASCO guidelines, dostarlimab held at cycle 2  -Hepatitis panel returned negative. Repeat hepatic panels showed LFTs trending down without corticosteroid intervention  -ALT = 75 (4x ULN) and AST = 49 (2x ULN) today. Improved to grade 1-2. Repeat CMP in 1 week and consider resuming immunotherapy.   -May need corticosteroids if transaminitis recurrs  -continue to monitor closely    Other considerations:  Constipation: continue fiber and increased water intake, consider stool softener if needed  Rash: possible grade 1 I/O rash. Continue OTC topicals. Will follow.       Pain assessment was performed today as a part of patient’s care. For patients with pain related to surgery, gynecologic malignancy or cancer treatment, the plan is as noted in the assessment/plan.  For patients with pain not related to these issues, they are to seek any further needed care from a more appropriate provider, such as PCP.      Follow-up:    Return to clinic in 1 week for reconsideration of cycle #3.      Electronically signed by CLAUDETTE Calvert on 12/28/2023

## 2023-12-28 NOTE — TELEPHONE ENCOUNTER
Notified patient of thyroid and CMP lab results, including LFTs. Informed LFTs still slightly elevated, but significantly improved from 2 weeks ago. We will repeat CMP in 1 week with reconsideration of treatment. She v/u and appreciated the call.

## 2023-12-29 ENCOUNTER — TELEPHONE (OUTPATIENT)
Dept: GYNECOLOGIC ONCOLOGY | Facility: CLINIC | Age: 73
End: 2023-12-29
Payer: MEDICARE

## 2023-12-29 NOTE — TELEPHONE ENCOUNTER
----- Message from CLAUDETTE Calvert sent at 12/28/2023  8:20 PM EST -----  Please notify patient CA-125 continues to trend down appropriately, now in normal limits. She will be pleased. Thanks!

## 2023-12-29 NOTE — TELEPHONE ENCOUNTER
RN called patient and reported her  level WNL. Patient was very excited and verbalized understanding.

## 2024-01-03 NOTE — PROGRESS NOTES
Cheryl Storm  [id number]  1950      Reason for visit: Endometrial cancer, toxicity assessment, consideration of treatment    History of present illness:  The patient is a 73 y.o. year old female who presents today for treatment and evaluation of the above issues.    Patient presents today for consideration of cycle #3 of combined carboplatin/paclitaxel/dostarlimab-gxly. Treatment thus far has been complicated by elevated AST/ALT leading to hold of dostarlimab at cycle #2. Hepatitis panel negative and hepatic function panel is being followed. Cycle #3 held due to neutropenia last week. Presents today for re-consideration of cycle 3.    She is feeling generally well today physically, is feeling depressed after coming back from holiday at her home in [facility]. Has no suicidal ideation. Reports worries related to her divorce and getting very anxious about small things such as going to the grocery store and getting an infection. Has also been dealing with eye infection/inflammation, but has seen ophthalmologist Dr. Collier who prescribed steroid eye drops which she has started. Denies vaginal bleeding and discharge. Denies abdominal or pelvic pain. Appetite is normal and she is tolerating a normal diet. Bowels and bladder are normal for her. Patient denies diarrhea or cough. Skin with rare itchy spot, not significant and using coconut oil for moisture.    Oncologic History:  Oncology/Hematology History   Endometrial cancer   8/8/2023 Initial Diagnosis    Patient to Dr. Felicia Botello with c/o postmenopausal bleeding. TVUS showed thickened endometrial stripe. EMB performed. Pathology showed serous endometrial cancer.  Referred to Gyn Oncology.   (Evaluation delayed due to social and insurance issues)     9/29/2023 Cancer Staged    Staging form: Corpus Uteri - Carcinoma And Carcinosarcoma, AJCC 8th Edition  - Clinical stage from 9/29/2023: FIGO Stage I (cT1, cN0, cM0) - Signed by Latasha Farr MD on  9/29/2023     10/12/2023 Imaging    CT chest, abdomen, pelvis:  Irregular bilateral cystic adnexal findings are present, appearing somewhat solid and abnormally enhancing on the right measuring 4.3 cm and multilocular, with a stellate enhancing solid component on the left measuring 7.5 cm. Findings are suspicious for extension of or metastatic involvement from reported primary GYN neoplasm. Correlate with findings on reported upcoming salpingo-oophorectomy. Otherwise no suspicious findings for more distant metastatic involvement in the chest, abdomen and pelvis.     10/16/2023 Surgery    Robotic-assisted total laparoscopic hysterectomy, bilateral salpingo-oophorectomy, sentinel pelvic lymph node dissection, left ureterolysis, and omentectomy by Dr. Latasha Farr at [facility].    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 No myometrial invasion identified. Lymphvascular invasion present, low. Left ovary involved by serous carcinoma (100% serous at the ovary). Left pelvic sentinel node negative. Right pelvic positive for isolated tumor cells.   pT3a, pN0(i+), cM0    Molecular testing:  p53 amplified in both components  ER negative in dedifferentiated component, positive in serous (30%, 2+)  IA focally positive (5%, 2+)  Her2 negative in didifferentiated component, strongly positive (3+) in the serous compenent  PD-L1 <1  MSI intact     10/23/2023 Cancer Staged    Staging form: Corpus Uteri - Carcinoma And Carcinosarcoma, AJCC 8th Edition  - Pathologic stage from 10/23/2023: FIGO Stage IIIA (pT3a, pN0(i+), cM0) - Signed by Kathleen Fung APRN on 11/14/2023 11/17/2023 -  Chemotherapy    OP ENDOMETRIAL Dostarlimab-gxly / CARBOplatin AUC=5 / PACLitaxel  - dostarlimab held at cycle #2 due to transaminitis  - cycle #3 delayed 1 week due to neutropenia     12/7/2023 Genetic Testing    Genetic testing NEGATIVE for pathogenic  mutations in BRCA 1/2 and 34 other genes via CancerNext panel.            Past Medical History:   Diagnosis Date    Anxiety     Depression     Endometrial cancer 09/29/2023    Heartburn     Immunotherapy 12/28/2023       Past Surgical History:   Procedure Laterality Date    ORIF HUMERUS FRACTURE      20yrs ago    OTHER SURGICAL HISTORY Left     plate placed in great toe    SINUS SURGERY      2022    TOTAL LAPAROSCOPIC HYSTERECTOMY SALPINGO OOPHORECTOMY Bilateral 10/16/2023    Procedure: INJECTION OF ICG DYE, TOTAL LAPAROSCOPIC HYSTERECTOMY BILATERAL SALPINGOOPHORECTOMY, SENTINEL PELVIC LYMPH NODE DISSECTION, LEFT URETEROLYSIS, OMENTECTOMY WITH DAVINCI ROBOT;  Surgeon: Latasha Farr MD;  Location: [facility];  Service: Robotics - DaVinci;  Laterality: Bilateral;       MEDICATIONS:    Current Outpatient Medications:     ciprofloxacin (CILOXAN) 0.3 % ophthalmic solution, Administer 1 drop to both eyes Every 2 (Two) Hours., Disp: , Rfl:     docusate sodium 100 MG capsule, Take 2 capsules by mouth 2 (Two) Times a Day As Needed for Constipation., Disp: 90 capsule, Rfl: 3    dorzolamide-timolol (COSOPT) 2-0.5 % ophthalmic solution, Administer 1 drop to both eyes 2 (Two) Times a Day., Disp: , Rfl:     escitalopram (LEXAPRO) 20 MG tablet, , Disp: , Rfl:     ibuprofen (ADVIL,MOTRIN) 600 MG tablet, Take 1 tablet by mouth Every 6 (Six) Hours As Needed for Mild Pain., Disp: 30 tablet, Rfl: 0    OLANZapine (zyPREXA) 5 MG tablet, Take 1 tablet by mouth Every Night. Take nightly x 4 starting night of chemotherapy., Disp: 4 tablet, Rfl: 5    ondansetron (ZOFRAN) 8 MG tablet, Take 1 tablet by mouth 3 (Three) Times a Day As Needed for Nausea or Vomiting., Disp: 30 tablet, Rfl: 5    simvastatin (ZOCOR) 10 MG tablet, , Disp: , Rfl:     valACYclovir (VALTREX) 500 MG tablet, , Disp: , Rfl:     clonazePAM (KlonoPIN) 0.5 MG tablet, Take 1 tablet by mouth 3 (Three) Times a Day As Needed for Anxiety., Disp: 90 tablet, Rfl: 0  No current  "facility-administered medications for this visit.    Facility-Administered Medications Ordered in Other Visits:     cetirizine (zyrTEC) tablet 10 mg, 10 mg, Oral, Daily, Latasha Farr MD, 10 mg at 01/05/24 1000     Allergies:  has No Known Allergies.    Social History:   Social History     Socioeconomic History    Marital status: Legally    Tobacco Use    Smoking status: Former     Years: 10     Types: Cigarettes     Passive exposure: Past    Smokeless tobacco: Never    Tobacco comments:     Smoked in college. \"Bummed\" occasionally    Vaping Use    Vaping Use: Never used   Substance and Sexual Activity    Alcohol use: Not Currently     Comment: Stopped drinking alcohol about a year ago    Drug use: Never    Sexual activity: Not Currently     Partners: Male       Family History:    Family History   Problem Relation Age of Onset    Diabetes Father     Pancreatic cancer Father     Cancer Mother     No Known Problems Brother     Diabetes Brother     Pancreatic cancer Brother     No Known Problems Sister     No Known Problems Daughter     Breast cancer Neg Hx         no family hx       Health Maintenance:    Health Maintenance   Topic Date Due    DXA SCAN  Never done    COLORECTAL CANCER SCREENING  Never done    TDAP/TD VACCINES (1 - Tdap) Never done    Pneumococcal Vaccine 65+ (1 of 1 - PCV) Never done    MAMMOGRAM  08/31/2018    ZOSTER VACCINE (2 of 2) 02/20/2021    INFLUENZA VACCINE  08/01/2023    COVID-19 Vaccine (3 - 2023-24 season) 09/01/2023    ANNUAL WELLNESS VISIT  Never done    HEPATITIS C SCREENING  Completed       Review of Systems:  Please refer to history of present illness.  Review of systems otherwise negative.    Physical Exam:  Vitals:    01/05/24 0839   BP: 127/74   Pulse: 76   Resp: 18   Temp: 97.3 °F (36.3 °C)   TempSrc: Temporal   SpO2: 99%   Weight: 53.1 kg (117 lb 1.6 oz)   Height: 162.6 cm (64\")   PainSc:   3   PainLoc: Eye     Body mass index is 20.1 kg/m².  Wt Readings from Last 3 " Encounters:   01/05/24 53.1 kg (117 lb 1.6 oz)   01/04/24 53.5 kg (118 lb)   12/28/23 53.8 kg (118 lb 9.6 oz)     GENERAL: Alert, well-appearing female appearing her stated age who is in no apparent distress.   HEENT: Bilateral inflamed cornea without discharge, purulent drainage. Head normocephalic, atraumatic. Mucus membranes moist.   NECK: Trachea midline, supple, without masses.  No thyromegaly.   BREASTS: Deferred  CARDIOVASCULAR: Normal rate, regular rhythm, no murmurs, rubs, or gallops.  No peripheral edema.  RESPIRATORY: Clear to auscultation bilaterally, normal respiratory effort  BACK:  No CVA tenderness, no vertebral tenderness on palpation  GASTROINTESTINAL:  Abdomen is soft, non-tender, non-distended, no rebound or guarding, no masses, or hernias.   SKIN:  Warm, dry, well-perfused.  All visible areas intact.  No rashes, lesions, ulcers.  PSYCHIATRIC: AO x3, with appropriate affect, normal thought processes.  NEUROLOGIC: No focal deficits.  Moves extremities well.  MUSCULOSKELETAL: Normal gait and station.   EXTREMITIES:   No cyanosis, clubbing, symmetric.  LYMPHATICS:  No cervical or inguinal adenopathy noted.     PELVIC exam: Deferred    ECOG PS 0    PROCEDURES: None    Diagnostic Data:    CT Chest With Contrast Diagnostic    Result Date: 10/12/2023  Impression: Irregular bilateral cystic adnexal findings are present, appearing somewhat solid and abnormally enhancing on the right measuring 4.3 cm and multilocular, with a stellate enhancing solid component on the left measuring 7.5 cm. Findings are suspicious for  extension of or metastatic involvement from reported primary GYN neoplasm. Correlate with findings on reported upcoming salpingo-oophorectomy. Otherwise no suspicious findings for more distant metastatic involvement in the chest, abdomen and pelvis. Electronically Signed: Yves Franks MD  10/12/2023 11:13 AM EDT  Workstation ID: [id number]    CT Abdomen Pelvis With Contrast    Result Date:  10/12/2023  Impression: Irregular bilateral cystic adnexal findings are present, appearing somewhat solid and abnormally enhancing on the right measuring 4.3 cm and multilocular, with a stellate enhancing solid component on the left measuring 7.5 cm. Findings are suspicious for  extension of or metastatic involvement from reported primary GYN neoplasm. Correlate with findings on reported upcoming salpingo-oophorectomy. Otherwise no suspicious findings for more distant metastatic involvement in the chest, abdomen and pelvis. Electronically Signed: Yves Franks MD  10/12/2023 11:13 AM EDT  Workstation ID: [id number]      Lab Results   Component Value Date    WBC 4.27 01/04/2024    HGB 10.9 (L) 01/04/2024    HCT 33.2 (L) 01/04/2024    MCV 99.1 (H) 01/04/2024     01/04/2024    NEUTROABS 2.30 01/04/2024    GLUCOSE 105 (H) 01/04/2024    BUN 14 01/04/2024    CREATININE 0.50 (L) 01/04/2024     01/04/2024    K 4.2 01/04/2024     01/04/2024    CO2 27.0 01/04/2024    CALCIUM 9.8 01/04/2024    ALBUMIN 4.6 01/04/2024    AST 62 (H) 01/04/2024    ALT 84 (H) 01/04/2024    BILITOT 0.4 01/04/2024     Lab Results   Component Value Date     31.7 12/28/2023     39.3 (H) 12/07/2023     47.7 (H) 11/14/2023     116.0 (H) 09/29/2023         Assessment & Plan   This is a 73 y.o. woman who presents for treatment of endometrial cancer presenting for consideration for treatment.    Encounter Diagnoses   Name Primary?    Endometrial cancer Yes    Reactive depression     Anxiety, generalized      Stage III endometrial cancer due to adnexal involvement  -Patient presents for cycle #3 of combined carboplatin/paclitaxel/dostarlimab for dedifferentiated/serous adenocarcinoma of the endometrium  -Tolerating treatment well with anticipated side effects  -dostarlimab hold with cycle #2 due to elevated AST and ALT >3x ULN but < 10x  -Cycle #3 held 12/28 due to neutropenia   -Labs today reviewed, okay to proceed with  treatment today     Chemotherapy-induced neutropenia  -Noted 12/28, cycle 3 held  -Labs today with ANC 2.3, WBC 4.27     Transaminitis  -Grade 2, asymptomatic noted at cycle 2  -Per ASCO guidelines, dostarlimab held at cycle 2  -Hepatitis panel returned negative. Repeat hepatic panels showed LFTs trending down without corticosteroid intervention  -ALT = 84 and AST = 62 today. Improved to grade 1-2.  -May need corticosteroids if transaminitis recurrs  -continue to monitor closely    Anxiety/Depression  -Related to generalized worries and depressed mood, worsening after holidays and with getting treatment held. No SI.  -Referral to Tarah Hart made  -Will start klonopin 0.5mg PRN as there is generalized anxiety component    Eye infection, bilateral  - Has seen ophthalmologist Dr. Collier, on steroid eye drops. He does not think this is related to chemotherapy.  - Has follow-up in 10 days     Constipation: continue fiber and increased water intake, consider stool softener if needed  Rash: possible grade 1 I/O rash. Continue OTC topicals. Will follow.    Pain assessment was performed today as a part of patient’s care.  For patients with pain related to surgery, gynecologic malignancy or cancer treatment, the plan is as noted in the assessment/plan.  For patients with pain not related to these issues, they are to seek any further needed care from a more appropriate provider, such as PCP.      Orders Placed This Encounter   Procedures    Ambulatory Referral to Psychotherapy     Referral Priority:   Routine     Referral Type:   Behavorial Health/Psych     Referral Reason:   Specialty Services Required     Referred to Provider:   Tarah Hart APRN     Requested Specialty:   Psychiatry     Number of Visits Requested:   1     FOLLOW UP: 3 weeks    I spent 30 minutes caring for Cheryl on this date of service. This time includes time spent by me in the following activities: preparing for the visit, reviewing tests,  performing a medically appropriate examination and/or evaluation, counseling and educating the patient/family/caregiver, ordering medications, tests, or procedures, referring and communicating with other health care professionals, documenting information in the medical record, and care coordination    Patient was seen and examined with Dr. Howell,  resident, who performed portions of the examination and documentation for this patient's care under my direct supervision.  I agree with the above documentation and plan.    Latasha Farr MD  01/05/24  11:46 EST

## 2024-01-04 ENCOUNTER — HOSPITAL ENCOUNTER (OUTPATIENT)
Dept: ONCOLOGY | Facility: HOSPITAL | Age: 74
Discharge: HOME | End: 2024-01-04
Admitting: NURSE PRACTITIONER
Payer: MEDICARE

## 2024-01-04 VITALS
SYSTOLIC BLOOD PRESSURE: 129 MMHG | BODY MASS INDEX: 20.14 KG/M2 | HEIGHT: 64 IN | HEART RATE: 74 BPM | TEMPERATURE: 97 F | RESPIRATION RATE: 16 BRPM | DIASTOLIC BLOOD PRESSURE: 64 MMHG | WEIGHT: 118 LBS

## 2024-01-04 DIAGNOSIS — C54.1 ENDOMETRIAL CANCER: ICD-10-CM

## 2024-01-04 LAB
ALBUMIN SERPL-MCNC: 4.6 G/DL (ref 3.5–5.2)
ALBUMIN/GLOB SERPL: 2.3 G/DL
ALP SERPL-CCNC: 180 U/L (ref 39–117)
ALT SERPL W P-5'-P-CCNC: 84 U/L (ref 1–33)
ANION GAP SERPL CALCULATED.3IONS-SCNC: 9 MMOL/L (ref 5–15)
AST SERPL-CCNC: 62 U/L (ref 1–32)
BASOPHILS # BLD AUTO: 0.03 10*3/MM3 (ref 0–0.2)
BASOPHILS NFR BLD AUTO: 0.7 % (ref 0–1.5)
BILIRUB SERPL-MCNC: 0.4 MG/DL (ref 0–1.2)
BUN SERPL-MCNC: 14 MG/DL (ref 8–23)
BUN/CREAT SERPL: 28 (ref 7–25)
CALCIUM SPEC-SCNC: 9.8 MG/DL (ref 8.6–10.5)
CHLORIDE SERPL-SCNC: 103 MMOL/L (ref 98–107)
CO2 SERPL-SCNC: 27 MMOL/L (ref 22–29)
CREAT SERPL-MCNC: 0.5 MG/DL (ref 0.57–1)
DEPRECATED RDW RBC AUTO: 66.1 FL (ref 37–54)
EGFRCR SERPLBLD CKD-EPI 2021: 99.8 ML/MIN/1.73
EOSINOPHIL # BLD AUTO: 0.03 10*3/MM3 (ref 0–0.4)
EOSINOPHIL NFR BLD AUTO: 0.7 % (ref 0.3–6.2)
ERYTHROCYTE [DISTWIDTH] IN BLOOD BY AUTOMATED COUNT: 18.1 % (ref 12.3–15.4)
GLOBULIN UR ELPH-MCNC: 2 GM/DL
GLUCOSE SERPL-MCNC: 105 MG/DL (ref 65–99)
HCT VFR BLD AUTO: 33.2 % (ref 34–46.6)
HGB BLD-MCNC: 10.9 G/DL (ref 12–15.9)
IMM GRANULOCYTES # BLD AUTO: 0.01 10*3/MM3 (ref 0–0.05)
IMM GRANULOCYTES NFR BLD AUTO: 0.2 % (ref 0–0.5)
LYMPHOCYTES # BLD AUTO: 1.36 10*3/MM3 (ref 0.7–3.1)
LYMPHOCYTES NFR BLD AUTO: 31.9 % (ref 19.6–45.3)
MCH RBC QN AUTO: 32.5 PG (ref 26.6–33)
MCHC RBC AUTO-ENTMCNC: 32.8 G/DL (ref 31.5–35.7)
MCV RBC AUTO: 99.1 FL (ref 79–97)
MONOCYTES # BLD AUTO: 0.54 10*3/MM3 (ref 0.1–0.9)
MONOCYTES NFR BLD AUTO: 12.6 % (ref 5–12)
NEUTROPHILS NFR BLD AUTO: 2.3 10*3/MM3 (ref 1.7–7)
NEUTROPHILS NFR BLD AUTO: 53.9 % (ref 42.7–76)
PLATELET # BLD AUTO: 220 10*3/MM3 (ref 140–450)
PMV BLD AUTO: 9.5 FL (ref 6–12)
POTASSIUM SERPL-SCNC: 4.2 MMOL/L (ref 3.5–5.2)
PROT SERPL-MCNC: 6.6 G/DL (ref 6–8.5)
RBC # BLD AUTO: 3.35 10*6/MM3 (ref 3.77–5.28)
SODIUM SERPL-SCNC: 139 MMOL/L (ref 136–145)
WBC NRBC COR # BLD AUTO: 4.27 10*3/MM3 (ref 3.4–10.8)

## 2024-01-04 PROCEDURE — 80053 COMPREHEN METABOLIC PANEL: CPT | Performed by: NURSE PRACTITIONER

## 2024-01-04 PROCEDURE — 85025 COMPLETE CBC W/AUTO DIFF WBC: CPT | Performed by: NURSE PRACTITIONER

## 2024-01-04 PROCEDURE — 36415 COLL VENOUS BLD VENIPUNCTURE: CPT

## 2024-01-05 ENCOUNTER — HOSPITAL ENCOUNTER (OUTPATIENT)
Dept: ONCOLOGY | Facility: HOSPITAL | Age: 74
Discharge: HOME | End: 2024-01-05
Admitting: OBSTETRICS & GYNECOLOGY
Payer: MEDICARE

## 2024-01-05 ENCOUNTER — OFFICE VISIT (OUTPATIENT)
Dept: GYNECOLOGIC ONCOLOGY | Facility: CLINIC | Age: 74
End: 2024-01-05
Payer: MEDICARE

## 2024-01-05 VITALS
BODY MASS INDEX: 19.99 KG/M2 | OXYGEN SATURATION: 99 % | DIASTOLIC BLOOD PRESSURE: 74 MMHG | RESPIRATION RATE: 18 BRPM | WEIGHT: 117.1 LBS | TEMPERATURE: 97.3 F | HEIGHT: 64 IN | SYSTOLIC BLOOD PRESSURE: 127 MMHG | HEART RATE: 76 BPM

## 2024-01-05 VITALS — SYSTOLIC BLOOD PRESSURE: 103 MMHG | HEART RATE: 64 BPM | DIASTOLIC BLOOD PRESSURE: 58 MMHG

## 2024-01-05 DIAGNOSIS — C54.1 ENDOMETRIAL CANCER: Primary | ICD-10-CM

## 2024-01-05 DIAGNOSIS — F32.9 REACTIVE DEPRESSION: ICD-10-CM

## 2024-01-05 DIAGNOSIS — F41.1 ANXIETY, GENERALIZED: ICD-10-CM

## 2024-01-05 PROCEDURE — 96417 CHEMO IV INFUS EACH ADDL SEQ: CPT

## 2024-01-05 PROCEDURE — 25010000002 CARBOPLATIN PER 50 MG: Performed by: OBSTETRICS & GYNECOLOGY

## 2024-01-05 PROCEDURE — 25010000002 FOSAPREPITANT PER 1 MG: Performed by: OBSTETRICS & GYNECOLOGY

## 2024-01-05 PROCEDURE — 25010000002 DEXAMETHASONE PER 1 MG: Performed by: OBSTETRICS & GYNECOLOGY

## 2024-01-05 PROCEDURE — 25010000002 PALONOSETRON PER 25 MCG: Performed by: OBSTETRICS & GYNECOLOGY

## 2024-01-05 PROCEDURE — 25010000002 DOSTARLIMAB-GXLY 500 MG/10ML SOLUTION 10 ML VIAL: Performed by: OBSTETRICS & GYNECOLOGY

## 2024-01-05 PROCEDURE — 25810000003 SODIUM CHLORIDE 0.9 % SOLUTION: Performed by: OBSTETRICS & GYNECOLOGY

## 2024-01-05 PROCEDURE — 96415 CHEMO IV INFUSION ADDL HR: CPT

## 2024-01-05 PROCEDURE — 25810000003 SODIUM CHLORIDE 0.9 % SOLUTION 250 ML FLEX CONT: Performed by: OBSTETRICS & GYNECOLOGY

## 2024-01-05 PROCEDURE — 96365 THER/PROPH/DIAG IV INF INIT: CPT

## 2024-01-05 PROCEDURE — 96375 TX/PRO/DX INJ NEW DRUG ADDON: CPT

## 2024-01-05 PROCEDURE — 96367 TX/PROPH/DG ADDL SEQ IV INF: CPT

## 2024-01-05 PROCEDURE — 25810000003 SODIUM CHLORIDE 0.9 % SOLUTION 500 ML FLEX CONT: Performed by: OBSTETRICS & GYNECOLOGY

## 2024-01-05 PROCEDURE — 96413 CHEMO IV INFUSION 1 HR: CPT

## 2024-01-05 PROCEDURE — 96368 THER/DIAG CONCURRENT INF: CPT

## 2024-01-05 PROCEDURE — 25010000002 PACLITAXEL PER 1 MG: Performed by: OBSTETRICS & GYNECOLOGY

## 2024-01-05 RX ORDER — CETIRIZINE HYDROCHLORIDE 10 MG/1
10 TABLET ORAL DAILY
Status: CANCELLED
Start: 2024-01-05

## 2024-01-05 RX ORDER — FAMOTIDINE 10 MG/ML
20 INJECTION, SOLUTION INTRAVENOUS ONCE
Status: COMPLETED | OUTPATIENT
Start: 2024-01-05 | End: 2024-01-05

## 2024-01-05 RX ORDER — CIPROFLOXACIN HYDROCHLORIDE 3.5 MG/ML
1 SOLUTION/ DROPS TOPICAL
COMMUNITY
Start: 2023-12-29

## 2024-01-05 RX ORDER — PALONOSETRON 0.05 MG/ML
0.25 INJECTION, SOLUTION INTRAVENOUS ONCE
Status: COMPLETED | OUTPATIENT
Start: 2024-01-05 | End: 2024-01-05

## 2024-01-05 RX ORDER — FAMOTIDINE 10 MG/ML
20 INJECTION, SOLUTION INTRAVENOUS ONCE
Status: CANCELLED | OUTPATIENT
Start: 2024-01-05

## 2024-01-05 RX ORDER — CETIRIZINE HYDROCHLORIDE 10 MG/1
10 TABLET ORAL DAILY
Status: DISCONTINUED | OUTPATIENT
Start: 2024-01-05 | End: 2024-01-06 | Stop reason: HOSPADM

## 2024-01-05 RX ORDER — FAMOTIDINE 10 MG/ML
20 INJECTION, SOLUTION INTRAVENOUS AS NEEDED
Status: CANCELLED | OUTPATIENT
Start: 2024-01-05

## 2024-01-05 RX ORDER — CLONAZEPAM 0.5 MG/1
0.5 TABLET ORAL 3 TIMES DAILY PRN
Qty: 90 TABLET | Refills: 0 | Status: SHIPPED | OUTPATIENT
Start: 2024-01-05

## 2024-01-05 RX ORDER — SODIUM CHLORIDE 9 MG/ML
20 INJECTION, SOLUTION INTRAVENOUS ONCE
Status: CANCELLED | OUTPATIENT
Start: 2024-01-05

## 2024-01-05 RX ORDER — SODIUM CHLORIDE 9 MG/ML
20 INJECTION, SOLUTION INTRAVENOUS ONCE
Status: COMPLETED | OUTPATIENT
Start: 2024-01-05 | End: 2024-01-05

## 2024-01-05 RX ORDER — PALONOSETRON 0.05 MG/ML
0.25 INJECTION, SOLUTION INTRAVENOUS ONCE
Status: CANCELLED | OUTPATIENT
Start: 2024-01-05

## 2024-01-05 RX ORDER — DIPHENHYDRAMINE HYDROCHLORIDE 50 MG/ML
50 INJECTION INTRAMUSCULAR; INTRAVENOUS AS NEEDED
Status: CANCELLED | OUTPATIENT
Start: 2024-01-05

## 2024-01-05 RX ADMIN — FAMOTIDINE 20 MG: 10 INJECTION, SOLUTION INTRAVENOUS at 10:37

## 2024-01-05 RX ADMIN — CETIRIZINE HYDROCHLORIDE 10 MG: 10 TABLET, FILM COATED ORAL at 10:00

## 2024-01-05 RX ADMIN — PACLITAXEL 280 MG: 6 INJECTION, SOLUTION INTRAVENOUS at 11:52

## 2024-01-05 RX ADMIN — FOSAPREPITANT DIMEGLUMINE 150 MG: 150 INJECTION, POWDER, LYOPHILIZED, FOR SOLUTION INTRAVENOUS at 10:39

## 2024-01-05 RX ADMIN — SODIUM CHLORIDE 500 MG: 9 INJECTION, SOLUTION INTRAVENOUS at 10:01

## 2024-01-05 RX ADMIN — CARBOPLATIN 430 MG: 600 INJECTION, SOLUTION INTRAVENOUS at 15:00

## 2024-01-05 RX ADMIN — DEXAMETHASONE SODIUM PHOSPHATE 20 MG: 4 INJECTION, SOLUTION INTRAMUSCULAR; INTRAVENOUS at 10:39

## 2024-01-05 RX ADMIN — PALONOSETRON HYDROCHLORIDE 0.25 MG: 0.25 INJECTION, SOLUTION INTRAVENOUS at 10:00

## 2024-01-05 RX ADMIN — SODIUM CHLORIDE 20 ML/HR: 9 INJECTION, SOLUTION INTRAVENOUS at 09:59

## 2024-01-25 NOTE — PROGRESS NOTES
Cheryl Storm  [id number]  1950      Reason for visit: Endometrial cancer, toxicity assessment, consideration of treatment    History of present illness:  The patient is a 73 y.o. year old female who presents today for treatment and evaluation of the above issues.    Patient presents today for consideration of cycle #4 of combined carboplatin/paclitaxel/dostarlimab-gxly. Treatment thus far has been complicated by elevated AST/ALT leading to hold of dostarlimab at cycle #2 and cycle #3 being delayed due to neutropenia. Hepatitis panel negative and hepatic function panel is being followed. Presents today for consideration of cycle 4.    She is feeling generally well today physically, still feeling down about winter, but overall coping. Eye inflammation/infection improved. Dr. Collier changed eye drops. Denies vaginal bleeding and discharge. Denies abdominal or pelvic pain. Appetite is normal and she is tolerating a normal diet. Bowels and bladder are normal for her. Patient denies diarrhea or cough. Skin with rare itchy spot, not significant, improving, and using coconut oil for moisture.    Oncologic History:  Oncology/Hematology History   Endometrial cancer   8/8/2023 Initial Diagnosis    Patient to Dr. Felicia Botello with c/o postmenopausal bleeding. TVUS showed thickened endometrial stripe. EMB performed. Pathology showed serous endometrial cancer.  Referred to Gyn Oncology.   (Evaluation delayed due to social and insurance issues)     9/29/2023 Cancer Staged    Staging form: Corpus Uteri - Carcinoma And Carcinosarcoma, AJCC 8th Edition  - Clinical stage from 9/29/2023: FIGO Stage I (cT1, cN0, cM0) - Signed by Latasha Farr MD on 9/29/2023     10/12/2023 Imaging    CT chest, abdomen, pelvis:  Irregular bilateral cystic adnexal findings are present, appearing somewhat solid and abnormally enhancing on the right measuring 4.3 cm and multilocular, with a stellate enhancing solid component on the left measuring  7.5 cm. Findings are suspicious for extension of or metastatic involvement from reported primary GYN neoplasm. Correlate with findings on reported upcoming salpingo-oophorectomy. Otherwise no suspicious findings for more distant metastatic involvement in the chest, abdomen and pelvis.     10/16/2023 Surgery    Robotic-assisted total laparoscopic hysterectomy, bilateral salpingo-oophorectomy, sentinel pelvic lymph node dissection, left ureterolysis, and omentectomy by Dr. Latasha Farr at [facility].    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 No myometrial invasion identified. Lymphvascular invasion present, low. Left ovary involved by serous carcinoma (100% serous at the ovary). Left pelvic sentinel node negative. Right pelvic positive for isolated tumor cells.   pT3a, pN0(i+), cM0    Molecular testing:  p53 amplified in both components  ER negative in dedifferentiated component, positive in serous (30%, 2+)  TX focally positive (5%, 2+)  Her2 negative in didifferentiated component, strongly positive (3+) in the serous compenent  PD-L1 <1  MSI intact     10/23/2023 Cancer Staged    Staging form: Corpus Uteri - Carcinoma And Carcinosarcoma, AJCC 8th Edition  - Pathologic stage from 10/23/2023: FIGO Stage IIIA (pT3a, pN0(i+), cM0) - Signed by Kathleen Fung APRN on 11/14/2023 11/17/2023 -  Chemotherapy    OP ENDOMETRIAL Dostarlimab-gxly / CARBOplatin AUC=5 / PACLitaxel  - dostarlimab held at cycle #2 due to transaminitis  - cycle #3 delayed 1 week due to neutropenia     12/7/2023 Genetic Testing    Genetic testing NEGATIVE for pathogenic mutations in BRCA 1/2 and 34 other genes via CancerNext panel.            Past Medical History:   Diagnosis Date    Anxiety     Depression     Endometrial cancer 09/29/2023    Heartburn     Immunotherapy 12/28/2023       Past Surgical History:   Procedure Laterality Date    ORIF  HUMERUS FRACTURE      20yrs ago    OTHER SURGICAL HISTORY Left     plate placed in great toe    SINUS SURGERY      2022    TOTAL LAPAROSCOPIC HYSTERECTOMY SALPINGO OOPHORECTOMY Bilateral 10/16/2023    Procedure: INJECTION OF ICG DYE, TOTAL LAPAROSCOPIC HYSTERECTOMY BILATERAL SALPINGOOPHORECTOMY, SENTINEL PELVIC LYMPH NODE DISSECTION, LEFT URETEROLYSIS, OMENTECTOMY WITH DAVINCI ROBOT;  Surgeon: Latasha Farr MD;  Location: [facility] [place];  Service: Robotics - DaVinci;  Laterality: Bilateral;       MEDICATIONS:    Current Outpatient Medications:     ciprofloxacin (CILOXAN) 0.3 % ophthalmic solution, Administer 1 drop to both eyes Every 2 (Two) Hours., Disp: , Rfl:     clonazePAM (KlonoPIN) 0.5 MG tablet, Take 1 tablet by mouth 3 (Three) Times a Day As Needed for Anxiety., Disp: 90 tablet, Rfl: 0    docusate sodium 100 MG capsule, Take 2 capsules by mouth 2 (Two) Times a Day As Needed for Constipation., Disp: 90 capsule, Rfl: 3    dorzolamide-timolol (COSOPT) 2-0.5 % ophthalmic solution, Administer 1 drop to both eyes 2 (Two) Times a Day., Disp: , Rfl:     escitalopram (LEXAPRO) 20 MG tablet, , Disp: , Rfl:     ibuprofen (ADVIL,MOTRIN) 600 MG tablet, Take 1 tablet by mouth Every 6 (Six) Hours As Needed for Mild Pain., Disp: 30 tablet, Rfl: 0    latanoprost (XALATAN) 0.005 % ophthalmic solution, Administer 1 drop to both eyes Daily., Disp: , Rfl:     OLANZapine (zyPREXA) 5 MG tablet, Take 1 tablet by mouth Every Night. Take nightly x 4 starting night of chemotherapy., Disp: 4 tablet, Rfl: 5    ondansetron (ZOFRAN) 8 MG tablet, Take 1 tablet by mouth 3 (Three) Times a Day As Needed for Nausea or Vomiting., Disp: 30 tablet, Rfl: 5    simvastatin (ZOCOR) 10 MG tablet, , Disp: , Rfl:     tobramycin-dexAMETHasone (TOBRADEX) 0.3-0.1 % ophthalmic suspension, Administer 1 drop to both eyes 1 (One) Time., Disp: , Rfl:     valACYclovir (VALTREX) 500 MG tablet, , Disp: , Rfl:   No current facility-administered medications for this  "visit.    Facility-Administered Medications Ordered in Other Visits:     CARBOplatin (PARAPLATIN) 400 mg in sodium chloride 0.9 % 290 mL chemo IVPB, 400 mg, Intravenous, Once, Latasha Farr MD    cetirizine (zyrTEC) tablet 10 mg, 10 mg, Oral, Daily, Latasha Farr MD    dexAMETHasone (DECADRON) 20 mg in sodium chloride 0.9 % IVPB, 20 mg, Intravenous, Once, Latasha Farr MD    Dostarlimab-gxly (JEMPERLI) 500 mg in sodium chloride 0.9 % 110 mL IVPB, 500 mg, Intravenous, Once, Latasha Farr MD    famotidine (PEPCID) injection 20 mg, 20 mg, Intravenous, Once, Latasha Farr MD    PACLitaxel (TAXOL) 245 mg in sodium chloride 0.9 % 540.8 mL chemo IVPB, 157.5 mg/m2 (Treatment Plan Recorded), Intravenous, Once, Latasha Farr MD     Allergies:  has No Known Allergies.    Social History:   Social History     Socioeconomic History    Marital status: Legally    Tobacco Use    Smoking status: Former     Years: 10     Types: Cigarettes     Passive exposure: Past    Smokeless tobacco: Never    Tobacco comments:     Smoked in college. \"Bummed\" occasionally    Vaping Use    Vaping Use: Never used   Substance and Sexual Activity    Alcohol use: Not Currently     Comment: Stopped drinking alcohol about a year ago    Drug use: Never    Sexual activity: Not Currently     Partners: Male       Family History:    Family History   Problem Relation Age of Onset    Diabetes Father     Pancreatic cancer Father     Cancer Mother     No Known Problems Brother     Diabetes Brother     Pancreatic cancer Brother     No Known Problems Sister     No Known Problems Daughter     Breast cancer Neg Hx         no family hx       Health Maintenance:    Health Maintenance   Topic Date Due    DXA SCAN  Never done    COLORECTAL CANCER SCREENING  Never done    TDAP/TD VACCINES (1 - Tdap) Never done    Pneumococcal Vaccine 65+ (1 of 1 - PCV) Never done    MAMMOGRAM  08/31/2018    ZOSTER VACCINE (2 of 2) 02/20/2021    " "INFLUENZA VACCINE  08/01/2023    COVID-19 Vaccine (3 - 2023-24 season) 09/01/2023    ANNUAL WELLNESS VISIT  Never done    HEPATITIS C SCREENING  Completed       Review of Systems:  Please refer to history of present illness.  Review of systems otherwise negative.    Physical Exam:  Vitals:    01/26/24 0844   BP: 129/73   Pulse: 72   Resp: 18   Temp: 97.5 °F (36.4 °C)   TempSrc: Temporal   SpO2: 98%   Weight: 53.5 kg (117 lb 14.4 oz)   Height: 162.6 cm (64\")   PainSc: 0-No pain       Body mass index is 20.24 kg/m².  Wt Readings from Last 3 Encounters:   01/26/24 53.5 kg (117 lb 14.4 oz)   01/05/24 53.1 kg (117 lb 1.6 oz)   01/04/24 53.5 kg (118 lb)     GENERAL: Alert, well-appearing female appearing her stated age who is in no apparent distress.   HEENT: Sclera anicteric. Head normocephalic, atraumatic. Mucus membranes moist.   NECK: Trachea midline, supple, without masses.  No thyromegaly.   BREASTS: Deferred  CARDIOVASCULAR: Normal rate, regular rhythm, no murmurs, rubs, or gallops.  No peripheral edema.  RESPIRATORY: Clear to auscultation bilaterally, normal respiratory effort  BACK:  No CVA tenderness, no vertebral tenderness on palpation  GASTROINTESTINAL:  Abdomen is soft, non-tender, non-distended, no rebound or guarding, no masses, or hernias.   SKIN:  Warm, dry, well-perfused.  All visible areas intact.  No rashes, lesions, ulcers.  PSYCHIATRIC: AO x3, with appropriate affect, normal thought processes.  NEUROLOGIC: No focal deficits.  Moves extremities well.  MUSCULOSKELETAL: Normal gait and station.   EXTREMITIES:   No cyanosis, clubbing, symmetric.  LYMPHATICS:  No cervical or inguinal adenopathy noted.     PELVIC exam: Deferred    ECOG PS 0    PROCEDURES: None    Diagnostic Data:    CT Chest With Contrast Diagnostic    Result Date: 10/12/2023  Impression: Irregular bilateral cystic adnexal findings are present, appearing somewhat solid and abnormally enhancing on the right measuring 4.3 cm and " multilocular, with a stellate enhancing solid component on the left measuring 7.5 cm. Findings are suspicious for  extension of or metastatic involvement from reported primary GYN neoplasm. Correlate with findings on reported upcoming salpingo-oophorectomy. Otherwise no suspicious findings for more distant metastatic involvement in the chest, abdomen and pelvis. Electronically Signed: Yves Franks MD  10/12/2023 11:13 AM EDT  Workstation ID: [id number]    CT Abdomen Pelvis With Contrast    Result Date: 10/12/2023  Impression: Irregular bilateral cystic adnexal findings are present, appearing somewhat solid and abnormally enhancing on the right measuring 4.3 cm and multilocular, with a stellate enhancing solid component on the left measuring 7.5 cm. Findings are suspicious for  extension of or metastatic involvement from reported primary GYN neoplasm. Correlate with findings on reported upcoming salpingo-oophorectomy. Otherwise no suspicious findings for more distant metastatic involvement in the chest, abdomen and pelvis. Electronically Signed: Yves Franks MD  10/12/2023 11:13 AM EDT  Workstation ID: [id number]      Lab Results   Component Value Date    WBC 3.05 (L) 01/26/2024    HGB 10.9 (L) 01/26/2024    HCT 31.6 (L) 01/26/2024    MCV 96.3 01/26/2024     01/26/2024    NEUTROABS 1.40 (L) 01/26/2024    GLUCOSE 101 (H) 01/26/2024    BUN 9 01/26/2024    CREATININE 0.59 01/26/2024     01/26/2024    K 4.1 01/26/2024     01/26/2024    CO2 27.0 01/26/2024    CALCIUM 9.6 01/26/2024    ALBUMIN 4.2 01/26/2024    AST 26 01/26/2024    ALT 30 01/26/2024    BILITOT 0.3 01/26/2024     Lab Results   Component Value Date     31.7 12/28/2023     39.3 (H) 12/07/2023     47.7 (H) 11/14/2023     116.0 (H) 09/29/2023         Lab Results   Component Value Date    TSH 2.290 01/26/2024    TSH 0.650 12/28/2023    TSH 1.650 12/07/2023    FREET4 1.09 01/26/2024    FREET4 1.28 12/28/2023    FREET4 1.03  12/07/2023         Assessment & Plan   This is a 73 y.o. woman who presents for treatment of endometrial cancer presenting for consideration for treatment.    Encounter Diagnoses   Name Primary?    Endometrial cancer Yes    Chemotherapy-induced neutropenia     Transaminitis     Immunotherapy        Stage III endometrial cancer due to adnexal involvement  -Patient presents for cycle #4 of combined carboplatin/paclitaxel/dostarlimab for dedifferentiated/serous adenocarcinoma of the endometrium  -Tolerating treatment well with anticipated side effects  -dostarlimab hold with cycle #2 due to elevated AST and ALT >3x ULN but < 10x  -Cycle #3 delayed 1 week due to neutropenia  -Labs today reviewed. Notable for mild neutropenia (1.4), LFTs normalized. Okay to proceed with treatment today with dose reduction for neutropenia.  Both carboplatin and paclitaxel reduced reduced by 10% today.   - normalized    Chemotherapy-induced neutropenia  -Noted 12/28, cycle 3 held  -Labs today with ANC 1.4, WBC 3.05     Transaminitis  -Grade 2, asymptomatic noted at cycle 2  -Per ASCO guidelines, dostarlimab held at cycle 2  -Hepatitis panel returned negative. Repeat hepatic panels showed LFTs trending down without corticosteroid intervention  -ALT = 30 and AST = 26 today. Resolved.  -May need corticosteroids if transaminitis recurrs  -continue to monitor closely    Anxiety/Depression  -Related to generalized worries and depressed mood, worsening after holidays and with getting treatment held. No SI.  -Referral to [facility] made, patient cancelled as she feels she is doing somewhat better.  -Previously started klonopin 0.5mg PRN as there is generalized anxiety component    Eye infection, bilateral  - Has seen ophthalmologist Dr. Collier, on steroid eye drops. He does not think this is related to chemotherapy.  - Last visit 1/25/25, added new eye drop for pressures and antibiotic eye drop. Thinks symptoms were related to infection plus  reaction to eye drop preservatives.    Constipation: continue fiber and increased water intake, consider stool softener if needed  Rash: possible grade 1 I/O rash. Continue OTC topicals. Will follow.    Pain assessment was performed today as a part of patient’s care.  For patients with pain related to surgery, gynecologic malignancy or cancer treatment, the plan is as noted in the assessment/plan.  For patients with pain not related to these issues, they are to seek any further needed care from a more appropriate provider, such as PCP.      No orders of the defined types were placed in this encounter.    FOLLOW UP: 3 weeks    Miriam Howell MD   Resident Physician, PGY-3  Gynecologic Oncology    I spent 40 minutes caring for Cheryl on this date of service. This time includes time spent by me in the following activities: preparing for the visit, reviewing tests, performing a medically appropriate examination and/or evaluation, counseling and educating the patient/family/caregiver, ordering medications, tests, or procedures, referring and communicating with other health care professionals, documenting information in the medical record, and care coordination    Patient was seen and examined with Dr. Howell,  resident, who performed portions of the examination and documentation for this patient's care under my direct supervision.  I agree with the above documentation and plan.    Latasha Farr MD  01/26/24  11:13 EST

## 2024-01-26 ENCOUNTER — HOSPITAL ENCOUNTER (OUTPATIENT)
Dept: ONCOLOGY | Facility: HOSPITAL | Age: 74
Discharge: HOME | End: 2024-01-26
Payer: MEDICARE

## 2024-01-26 ENCOUNTER — OFFICE VISIT (OUTPATIENT)
Dept: GYNECOLOGIC ONCOLOGY | Facility: CLINIC | Age: 74
End: 2024-01-26
Payer: MEDICARE

## 2024-01-26 VITALS
RESPIRATION RATE: 18 BRPM | DIASTOLIC BLOOD PRESSURE: 73 MMHG | HEIGHT: 64 IN | OXYGEN SATURATION: 98 % | SYSTOLIC BLOOD PRESSURE: 129 MMHG | HEART RATE: 72 BPM | TEMPERATURE: 97.5 F | BODY MASS INDEX: 20.13 KG/M2 | WEIGHT: 117.9 LBS

## 2024-01-26 VITALS — DIASTOLIC BLOOD PRESSURE: 57 MMHG | HEART RATE: 64 BPM | SYSTOLIC BLOOD PRESSURE: 107 MMHG

## 2024-01-26 DIAGNOSIS — Z29.89 IMMUNOTHERAPY: ICD-10-CM

## 2024-01-26 DIAGNOSIS — D70.1 CHEMOTHERAPY-INDUCED NEUTROPENIA: ICD-10-CM

## 2024-01-26 DIAGNOSIS — C54.1 ENDOMETRIAL CANCER: Primary | ICD-10-CM

## 2024-01-26 DIAGNOSIS — T45.1X5A CHEMOTHERAPY-INDUCED NEUTROPENIA: ICD-10-CM

## 2024-01-26 DIAGNOSIS — R74.01 TRANSAMINITIS: ICD-10-CM

## 2024-01-26 LAB
ALBUMIN SERPL-MCNC: 4.2 G/DL (ref 3.5–5.2)
ALBUMIN/GLOB SERPL: 2.3 G/DL
ALP SERPL-CCNC: 103 U/L (ref 39–117)
ALT SERPL W P-5'-P-CCNC: 30 U/L (ref 1–33)
ANION GAP SERPL CALCULATED.3IONS-SCNC: 10 MMOL/L (ref 5–15)
AST SERPL-CCNC: 26 U/L (ref 1–32)
BASOPHILS # BLD AUTO: 0.04 10*3/MM3 (ref 0–0.2)
BASOPHILS NFR BLD AUTO: 1.3 % (ref 0–1.5)
BILIRUB SERPL-MCNC: 0.3 MG/DL (ref 0–1.2)
BUN SERPL-MCNC: 9 MG/DL (ref 8–23)
BUN/CREAT SERPL: 15.3 (ref 7–25)
CALCIUM SPEC-SCNC: 9.6 MG/DL (ref 8.6–10.5)
CANCER AG125 SERPL QL: 32.4 U/ML (ref 0–38.1)
CHLORIDE SERPL-SCNC: 101 MMOL/L (ref 98–107)
CO2 SERPL-SCNC: 27 MMOL/L (ref 22–29)
CREAT SERPL-MCNC: 0.59 MG/DL (ref 0.57–1)
DEPRECATED RDW RBC AUTO: 58.8 FL (ref 37–54)
EGFRCR SERPLBLD CKD-EPI 2021: 95.9 ML/MIN/1.73
EOSINOPHIL # BLD AUTO: 0.03 10*3/MM3 (ref 0–0.4)
EOSINOPHIL NFR BLD AUTO: 1 % (ref 0.3–6.2)
ERYTHROCYTE [DISTWIDTH] IN BLOOD BY AUTOMATED COUNT: 16.7 % (ref 12.3–15.4)
GLOBULIN UR ELPH-MCNC: 1.8 GM/DL
GLUCOSE SERPL-MCNC: 101 MG/DL (ref 65–99)
HCT VFR BLD AUTO: 31.6 % (ref 34–46.6)
HGB BLD-MCNC: 10.9 G/DL (ref 12–15.9)
IMM GRANULOCYTES # BLD AUTO: 0 10*3/MM3 (ref 0–0.05)
IMM GRANULOCYTES NFR BLD AUTO: 0 % (ref 0–0.5)
LYMPHOCYTES # BLD AUTO: 1.12 10*3/MM3 (ref 0.7–3.1)
LYMPHOCYTES NFR BLD AUTO: 36.7 % (ref 19.6–45.3)
MCH RBC QN AUTO: 33.2 PG (ref 26.6–33)
MCHC RBC AUTO-ENTMCNC: 34.5 G/DL (ref 31.5–35.7)
MCV RBC AUTO: 96.3 FL (ref 79–97)
MONOCYTES # BLD AUTO: 0.46 10*3/MM3 (ref 0.1–0.9)
MONOCYTES NFR BLD AUTO: 15.1 % (ref 5–12)
NEUTROPHILS NFR BLD AUTO: 1.4 10*3/MM3 (ref 1.7–7)
NEUTROPHILS NFR BLD AUTO: 45.9 % (ref 42.7–76)
PLATELET # BLD AUTO: 227 10*3/MM3 (ref 140–450)
PMV BLD AUTO: 9.1 FL (ref 6–12)
POTASSIUM SERPL-SCNC: 4.1 MMOL/L (ref 3.5–5.2)
PROT SERPL-MCNC: 6 G/DL (ref 6–8.5)
RBC # BLD AUTO: 3.28 10*6/MM3 (ref 3.77–5.28)
SODIUM SERPL-SCNC: 138 MMOL/L (ref 136–145)
T4 FREE SERPL-MCNC: 1.09 NG/DL (ref 0.93–1.7)
TSH SERPL DL<=0.05 MIU/L-ACNC: 2.29 UIU/ML (ref 0.27–4.2)
WBC NRBC COR # BLD AUTO: 3.05 10*3/MM3 (ref 3.4–10.8)

## 2024-01-26 PROCEDURE — 80053 COMPREHEN METABOLIC PANEL: CPT | Performed by: OBSTETRICS & GYNECOLOGY

## 2024-01-26 PROCEDURE — 25010000002 DOSTARLIMAB-GXLY 500 MG/10ML SOLUTION 10 ML VIAL: Performed by: OBSTETRICS & GYNECOLOGY

## 2024-01-26 PROCEDURE — 25010000002 FOSAPREPITANT PER 1 MG: Performed by: OBSTETRICS & GYNECOLOGY

## 2024-01-26 PROCEDURE — 25010000002 PALONOSETRON PER 25 MCG: Performed by: OBSTETRICS & GYNECOLOGY

## 2024-01-26 PROCEDURE — 25010000002 CARBOPLATIN PER 50 MG: Performed by: OBSTETRICS & GYNECOLOGY

## 2024-01-26 PROCEDURE — 25810000003 SODIUM CHLORIDE 0.9 % SOLUTION 500 ML FLEX CONT: Performed by: OBSTETRICS & GYNECOLOGY

## 2024-01-26 PROCEDURE — 96413 CHEMO IV INFUSION 1 HR: CPT

## 2024-01-26 PROCEDURE — 25010000002 PACLITAXEL PER 1 MG: Performed by: OBSTETRICS & GYNECOLOGY

## 2024-01-26 PROCEDURE — 96365 THER/PROPH/DIAG IV INF INIT: CPT

## 2024-01-26 PROCEDURE — 84443 ASSAY THYROID STIM HORMONE: CPT | Performed by: OBSTETRICS & GYNECOLOGY

## 2024-01-26 PROCEDURE — 96368 THER/DIAG CONCURRENT INF: CPT

## 2024-01-26 PROCEDURE — 96367 TX/PROPH/DG ADDL SEQ IV INF: CPT

## 2024-01-26 PROCEDURE — 25810000003 SODIUM CHLORIDE 0.9 % SOLUTION 250 ML FLEX CONT: Performed by: OBSTETRICS & GYNECOLOGY

## 2024-01-26 PROCEDURE — 96417 CHEMO IV INFUS EACH ADDL SEQ: CPT

## 2024-01-26 PROCEDURE — 85025 COMPLETE CBC W/AUTO DIFF WBC: CPT | Performed by: OBSTETRICS & GYNECOLOGY

## 2024-01-26 PROCEDURE — 25810000003 SODIUM CHLORIDE 0.9 % SOLUTION: Performed by: OBSTETRICS & GYNECOLOGY

## 2024-01-26 PROCEDURE — 86304 IMMUNOASSAY TUMOR CA 125: CPT | Performed by: OBSTETRICS & GYNECOLOGY

## 2024-01-26 PROCEDURE — 84439 ASSAY OF FREE THYROXINE: CPT | Performed by: OBSTETRICS & GYNECOLOGY

## 2024-01-26 PROCEDURE — 96415 CHEMO IV INFUSION ADDL HR: CPT

## 2024-01-26 PROCEDURE — 96375 TX/PRO/DX INJ NEW DRUG ADDON: CPT

## 2024-01-26 PROCEDURE — 25010000002 DEXAMETHASONE PER 1 MG: Performed by: OBSTETRICS & GYNECOLOGY

## 2024-01-26 RX ORDER — FAMOTIDINE 10 MG/ML
20 INJECTION, SOLUTION INTRAVENOUS ONCE
Status: COMPLETED | OUTPATIENT
Start: 2024-01-26 | End: 2024-01-26

## 2024-01-26 RX ORDER — FAMOTIDINE 10 MG/ML
20 INJECTION, SOLUTION INTRAVENOUS ONCE
Status: CANCELLED | OUTPATIENT
Start: 2024-01-26

## 2024-01-26 RX ORDER — DIPHENHYDRAMINE HYDROCHLORIDE 50 MG/ML
50 INJECTION INTRAMUSCULAR; INTRAVENOUS AS NEEDED
Status: CANCELLED | OUTPATIENT
Start: 2024-01-26

## 2024-01-26 RX ORDER — CETIRIZINE HYDROCHLORIDE 10 MG/1
10 TABLET ORAL DAILY
Status: DISCONTINUED | OUTPATIENT
Start: 2024-01-26 | End: 2024-01-27 | Stop reason: HOSPADM

## 2024-01-26 RX ORDER — PALONOSETRON 0.05 MG/ML
0.25 INJECTION, SOLUTION INTRAVENOUS ONCE
Status: COMPLETED | OUTPATIENT
Start: 2024-01-26 | End: 2024-01-26

## 2024-01-26 RX ORDER — LATANOPROST 50 UG/ML
1 SOLUTION/ DROPS OPHTHALMIC DAILY
COMMUNITY
Start: 2024-01-25

## 2024-01-26 RX ORDER — CETIRIZINE HYDROCHLORIDE 10 MG/1
10 TABLET ORAL DAILY
Status: CANCELLED
Start: 2024-01-26

## 2024-01-26 RX ORDER — SODIUM CHLORIDE 9 MG/ML
20 INJECTION, SOLUTION INTRAVENOUS ONCE
Status: CANCELLED | OUTPATIENT
Start: 2024-01-26

## 2024-01-26 RX ORDER — PALONOSETRON 0.05 MG/ML
0.25 INJECTION, SOLUTION INTRAVENOUS ONCE
Status: CANCELLED | OUTPATIENT
Start: 2024-01-26

## 2024-01-26 RX ORDER — FAMOTIDINE 10 MG/ML
20 INJECTION, SOLUTION INTRAVENOUS AS NEEDED
Status: CANCELLED | OUTPATIENT
Start: 2024-01-26

## 2024-01-26 RX ORDER — TOBRAMYCIN AND DEXAMETHASONE 3; 1 MG/ML; MG/ML
1 SUSPENSION/ DROPS OPHTHALMIC ONCE
COMMUNITY
Start: 2024-01-25

## 2024-01-26 RX ORDER — SODIUM CHLORIDE 9 MG/ML
20 INJECTION, SOLUTION INTRAVENOUS ONCE
Status: COMPLETED | OUTPATIENT
Start: 2024-01-26 | End: 2024-01-26

## 2024-01-26 RX ADMIN — FAMOTIDINE 20 MG: 10 INJECTION, SOLUTION INTRAVENOUS at 11:49

## 2024-01-26 RX ADMIN — FOSAPREPITANT 150 MG: 150 INJECTION, POWDER, LYOPHILIZED, FOR SOLUTION INTRAVENOUS at 10:37

## 2024-01-26 RX ADMIN — CETIRIZINE HYDROCHLORIDE 10 MG: 10 TABLET, FILM COATED ORAL at 11:49

## 2024-01-26 RX ADMIN — DEXAMETHASONE SODIUM PHOSPHATE 20 MG: 4 INJECTION, SOLUTION INTRAMUSCULAR; INTRAVENOUS at 11:49

## 2024-01-26 RX ADMIN — PACLITAXEL 245 MG: 6 INJECTION, SOLUTION INTRAVENOUS at 12:30

## 2024-01-26 RX ADMIN — SODIUM CHLORIDE 20 ML/HR: 9 INJECTION, SOLUTION INTRAVENOUS at 10:31

## 2024-01-26 RX ADMIN — CARBOPLATIN 400 MG: 10 INJECTION, SOLUTION INTRAVENOUS at 15:35

## 2024-01-26 RX ADMIN — PALONOSETRON HYDROCHLORIDE 0.25 MG: 0.25 INJECTION INTRAVENOUS at 10:35

## 2024-01-26 RX ADMIN — SODIUM CHLORIDE 500 MG: 9 INJECTION, SOLUTION INTRAVENOUS at 11:14

## 2024-02-15 ENCOUNTER — HOSPITAL ENCOUNTER (OUTPATIENT)
Dept: ONCOLOGY | Facility: HOSPITAL | Age: 74
Discharge: HOME | End: 2024-02-15
Admitting: OBSTETRICS & GYNECOLOGY
Payer: MEDICARE

## 2024-02-15 VITALS
SYSTOLIC BLOOD PRESSURE: 114 MMHG | DIASTOLIC BLOOD PRESSURE: 79 MMHG | WEIGHT: 118.4 LBS | HEART RATE: 75 BPM | BODY MASS INDEX: 20.22 KG/M2 | RESPIRATION RATE: 18 BRPM | TEMPERATURE: 98.1 F | HEIGHT: 64 IN

## 2024-02-15 DIAGNOSIS — C54.1 ENDOMETRIAL CANCER: ICD-10-CM

## 2024-02-15 LAB
ALBUMIN SERPL-MCNC: 4.3 G/DL (ref 3.5–5.2)
ALBUMIN/GLOB SERPL: 1.8 G/DL
ALP SERPL-CCNC: 88 U/L (ref 39–117)
ALT SERPL W P-5'-P-CCNC: 32 U/L (ref 1–33)
ANION GAP SERPL CALCULATED.3IONS-SCNC: 10 MMOL/L (ref 5–15)
AST SERPL-CCNC: 40 U/L (ref 1–32)
BASOPHILS # BLD AUTO: 0.02 10*3/MM3 (ref 0–0.2)
BASOPHILS NFR BLD AUTO: 0.8 % (ref 0–1.5)
BILIRUB SERPL-MCNC: 0.4 MG/DL (ref 0–1.2)
BUN SERPL-MCNC: 13 MG/DL (ref 8–23)
BUN/CREAT SERPL: 19.7 (ref 7–25)
CALCIUM SPEC-SCNC: 9.2 MG/DL (ref 8.6–10.5)
CHLORIDE SERPL-SCNC: 99 MMOL/L (ref 98–107)
CO2 SERPL-SCNC: 25 MMOL/L (ref 22–29)
CREAT SERPL-MCNC: 0.66 MG/DL (ref 0.57–1)
DEPRECATED RDW RBC AUTO: 55.7 FL (ref 37–54)
EGFRCR SERPLBLD CKD-EPI 2021: 93.3 ML/MIN/1.73
EOSINOPHIL # BLD AUTO: 0.03 10*3/MM3 (ref 0–0.4)
EOSINOPHIL NFR BLD AUTO: 1.2 % (ref 0.3–6.2)
ERYTHROCYTE [DISTWIDTH] IN BLOOD BY AUTOMATED COUNT: 16.1 % (ref 12.3–15.4)
GLOBULIN UR ELPH-MCNC: 2.4 GM/DL
GLUCOSE SERPL-MCNC: 94 MG/DL (ref 65–99)
HCT VFR BLD AUTO: 29.1 % (ref 34–46.6)
HGB BLD-MCNC: 9.8 G/DL (ref 12–15.9)
IMM GRANULOCYTES # BLD AUTO: 0 10*3/MM3 (ref 0–0.05)
IMM GRANULOCYTES NFR BLD AUTO: 0 % (ref 0–0.5)
LYMPHOCYTES # BLD AUTO: 1.02 10*3/MM3 (ref 0.7–3.1)
LYMPHOCYTES NFR BLD AUTO: 40.6 % (ref 19.6–45.3)
MCH RBC QN AUTO: 32.9 PG (ref 26.6–33)
MCHC RBC AUTO-ENTMCNC: 33.7 G/DL (ref 31.5–35.7)
MCV RBC AUTO: 97.7 FL (ref 79–97)
MONOCYTES # BLD AUTO: 0.44 10*3/MM3 (ref 0.1–0.9)
MONOCYTES NFR BLD AUTO: 17.5 % (ref 5–12)
NEUTROPHILS NFR BLD AUTO: 1 10*3/MM3 (ref 1.7–7)
NEUTROPHILS NFR BLD AUTO: 39.9 % (ref 42.7–76)
PLATELET # BLD AUTO: 219 10*3/MM3 (ref 140–450)
PMV BLD AUTO: 9.4 FL (ref 6–12)
POTASSIUM SERPL-SCNC: 3.9 MMOL/L (ref 3.5–5.2)
PROT SERPL-MCNC: 6.7 G/DL (ref 6–8.5)
RBC # BLD AUTO: 2.98 10*6/MM3 (ref 3.77–5.28)
SODIUM SERPL-SCNC: 134 MMOL/L (ref 136–145)
T4 FREE SERPL-MCNC: 0.2 NG/DL (ref 0.93–1.7)
TSH SERPL DL<=0.05 MIU/L-ACNC: 29.25 UIU/ML (ref 0.27–4.2)
WBC NRBC COR # BLD AUTO: 2.51 10*3/MM3 (ref 3.4–10.8)

## 2024-02-15 PROCEDURE — 86304 IMMUNOASSAY TUMOR CA 125: CPT | Performed by: OBSTETRICS & GYNECOLOGY

## 2024-02-15 PROCEDURE — 84443 ASSAY THYROID STIM HORMONE: CPT | Performed by: OBSTETRICS & GYNECOLOGY

## 2024-02-15 PROCEDURE — 80053 COMPREHEN METABOLIC PANEL: CPT | Performed by: OBSTETRICS & GYNECOLOGY

## 2024-02-15 PROCEDURE — 85025 COMPLETE CBC W/AUTO DIFF WBC: CPT | Performed by: OBSTETRICS & GYNECOLOGY

## 2024-02-15 PROCEDURE — 36415 COLL VENOUS BLD VENIPUNCTURE: CPT

## 2024-02-15 PROCEDURE — 84439 ASSAY OF FREE THYROXINE: CPT | Performed by: OBSTETRICS & GYNECOLOGY

## 2024-02-16 ENCOUNTER — OFFICE VISIT (OUTPATIENT)
Dept: GYNECOLOGIC ONCOLOGY | Facility: CLINIC | Age: 74
End: 2024-02-16
Payer: MEDICARE

## 2024-02-16 ENCOUNTER — HOSPITAL ENCOUNTER (OUTPATIENT)
Dept: ONCOLOGY | Facility: HOSPITAL | Age: 74
Discharge: HOME | End: 2024-02-16
Payer: MEDICARE

## 2024-02-16 VITALS
WEIGHT: 116.5 LBS | SYSTOLIC BLOOD PRESSURE: 136 MMHG | HEIGHT: 64 IN | OXYGEN SATURATION: 98 % | RESPIRATION RATE: 18 BRPM | BODY MASS INDEX: 19.89 KG/M2 | DIASTOLIC BLOOD PRESSURE: 60 MMHG | TEMPERATURE: 98 F | HEART RATE: 64 BPM

## 2024-02-16 DIAGNOSIS — Z09 CHEMOTHERAPY FOLLOW-UP EXAMINATION: ICD-10-CM

## 2024-02-16 DIAGNOSIS — R74.01 TRANSAMINITIS: ICD-10-CM

## 2024-02-16 DIAGNOSIS — C54.1 ENDOMETRIAL CANCER: Primary | ICD-10-CM

## 2024-02-16 DIAGNOSIS — G47.9 TROUBLE IN SLEEPING: ICD-10-CM

## 2024-02-16 DIAGNOSIS — D70.1 CHEMOTHERAPY-INDUCED NEUTROPENIA: ICD-10-CM

## 2024-02-16 DIAGNOSIS — R19.7 DIARRHEA, UNSPECIFIED TYPE: ICD-10-CM

## 2024-02-16 DIAGNOSIS — Z29.89 IMMUNOTHERAPY: ICD-10-CM

## 2024-02-16 DIAGNOSIS — E03.2 HYPOTHYROIDISM DUE TO MEDICATION: ICD-10-CM

## 2024-02-16 DIAGNOSIS — T45.1X5A CHEMOTHERAPY-INDUCED NEUTROPENIA: ICD-10-CM

## 2024-02-16 LAB — CANCER AG125 SERPL QL: 25.2 U/ML (ref 0–38.1)

## 2024-02-16 RX ORDER — LEVOTHYROXINE SODIUM 0.1 MG/1
100 TABLET ORAL DAILY
Qty: 30 TABLET | Refills: 1 | Status: SHIPPED | OUTPATIENT
Start: 2024-02-16

## 2024-02-16 RX ORDER — DIPHENOXYLATE HYDROCHLORIDE AND ATROPINE SULFATE 2.5; .025 MG/1; MG/1
1 TABLET ORAL 2 TIMES DAILY PRN
Qty: 30 TABLET | Refills: 0 | Status: SHIPPED | OUTPATIENT
Start: 2024-02-16

## 2024-02-16 RX ORDER — ZOLPIDEM TARTRATE 10 MG/1
10 TABLET ORAL NIGHTLY PRN
Qty: 30 TABLET | Refills: 0 | Status: SHIPPED | OUTPATIENT
Start: 2024-02-16

## 2024-02-19 ENCOUNTER — TELEPHONE (OUTPATIENT)
Dept: GYNECOLOGIC ONCOLOGY | Facility: CLINIC | Age: 74
End: 2024-02-19
Payer: MEDICARE

## 2024-02-19 NOTE — TELEPHONE ENCOUNTER
"Rn received a call from patient stating that she is having \"terrible diarrhea.\" Patient stated that it started last week and she was given a prescription for lomotil at her appointment last Friday.  Patient states that she is not taking the imodium anymore. RN discussed patient's complaints with APRN. APRN recommended that the patient take both lomotil and imodium  for tonight, count her stools for 24 hours, and call us back tomorrow. RN stated that the patient may need to have IV fluids as well as a steroid taper tomorrow if the symptoms persist, per APRN. Patient verbalized understanding.  "

## 2024-02-21 DIAGNOSIS — C54.1 ENDOMETRIAL CANCER: Primary | ICD-10-CM

## 2024-02-21 RX ORDER — CETIRIZINE HYDROCHLORIDE 10 MG/1
10 TABLET ORAL DAILY
Start: 2024-02-23

## 2024-02-21 RX ORDER — DIPHENHYDRAMINE HYDROCHLORIDE 50 MG/ML
50 INJECTION INTRAMUSCULAR; INTRAVENOUS AS NEEDED
OUTPATIENT
Start: 2024-02-23

## 2024-02-21 RX ORDER — FAMOTIDINE 10 MG/ML
20 INJECTION, SOLUTION INTRAVENOUS AS NEEDED
OUTPATIENT
Start: 2024-02-23

## 2024-02-21 RX ORDER — FAMOTIDINE 10 MG/ML
20 INJECTION, SOLUTION INTRAVENOUS ONCE
OUTPATIENT
Start: 2024-02-23

## 2024-02-21 RX ORDER — PALONOSETRON 0.05 MG/ML
0.25 INJECTION, SOLUTION INTRAVENOUS ONCE
OUTPATIENT
Start: 2024-02-23

## 2024-02-22 ENCOUNTER — HOSPITAL ENCOUNTER (OUTPATIENT)
Dept: ONCOLOGY | Facility: HOSPITAL | Age: 74
Discharge: HOME | End: 2024-02-22
Admitting: NURSE PRACTITIONER
Payer: MEDICARE

## 2024-02-22 ENCOUNTER — TELEPHONE (OUTPATIENT)
Dept: GYNECOLOGIC ONCOLOGY | Facility: CLINIC | Age: 74
End: 2024-02-22
Payer: MEDICARE

## 2024-02-22 VITALS
DIASTOLIC BLOOD PRESSURE: 71 MMHG | HEART RATE: 80 BPM | SYSTOLIC BLOOD PRESSURE: 116 MMHG | RESPIRATION RATE: 16 BRPM | HEIGHT: 64 IN | WEIGHT: 117 LBS | TEMPERATURE: 98 F | BODY MASS INDEX: 19.97 KG/M2

## 2024-02-22 DIAGNOSIS — C54.1 ENDOMETRIAL CANCER: Primary | ICD-10-CM

## 2024-02-22 DIAGNOSIS — T45.1X5A CHEMOTHERAPY INDUCED DIARRHEA: ICD-10-CM

## 2024-02-22 DIAGNOSIS — K52.1 CHEMOTHERAPY INDUCED DIARRHEA: ICD-10-CM

## 2024-02-22 LAB
ALBUMIN SERPL-MCNC: 4.1 G/DL (ref 3.5–5.2)
ALBUMIN/GLOB SERPL: 1.6 G/DL
ALP SERPL-CCNC: 81 U/L (ref 39–117)
ALT SERPL W P-5'-P-CCNC: 26 U/L (ref 1–33)
ANION GAP SERPL CALCULATED.3IONS-SCNC: 13 MMOL/L (ref 5–15)
AST SERPL-CCNC: 32 U/L (ref 1–32)
BASOPHILS # BLD AUTO: 0.01 10*3/MM3 (ref 0–0.2)
BASOPHILS NFR BLD AUTO: 0.4 % (ref 0–1.5)
BILIRUB SERPL-MCNC: 0.3 MG/DL (ref 0–1.2)
BUN SERPL-MCNC: 8 MG/DL (ref 8–23)
BUN/CREAT SERPL: 15.1 (ref 7–25)
CALCIUM SPEC-SCNC: 9.2 MG/DL (ref 8.6–10.5)
CHLORIDE SERPL-SCNC: 97 MMOL/L (ref 98–107)
CO2 SERPL-SCNC: 26 MMOL/L (ref 22–29)
CREAT SERPL-MCNC: 0.53 MG/DL (ref 0.57–1)
DEPRECATED RDW RBC AUTO: 57.2 FL (ref 37–54)
EGFRCR SERPLBLD CKD-EPI 2021: 98.4 ML/MIN/1.73
EOSINOPHIL # BLD AUTO: 0.02 10*3/MM3 (ref 0–0.4)
EOSINOPHIL NFR BLD AUTO: 0.8 % (ref 0.3–6.2)
ERYTHROCYTE [DISTWIDTH] IN BLOOD BY AUTOMATED COUNT: 16.2 % (ref 12.3–15.4)
GLOBULIN UR ELPH-MCNC: 2.5 GM/DL
GLUCOSE SERPL-MCNC: 96 MG/DL (ref 65–99)
HCT VFR BLD AUTO: 24.6 % (ref 34–46.6)
HGB BLD-MCNC: 8.7 G/DL (ref 12–15.9)
IMM GRANULOCYTES # BLD AUTO: 0.01 10*3/MM3 (ref 0–0.05)
IMM GRANULOCYTES NFR BLD AUTO: 0.4 % (ref 0–0.5)
LYMPHOCYTES # BLD AUTO: 0.99 10*3/MM3 (ref 0.7–3.1)
LYMPHOCYTES NFR BLD AUTO: 40.1 % (ref 19.6–45.3)
MCH RBC QN AUTO: 33.9 PG (ref 26.6–33)
MCHC RBC AUTO-ENTMCNC: 35.4 G/DL (ref 31.5–35.7)
MCV RBC AUTO: 95.7 FL (ref 79–97)
MONOCYTES # BLD AUTO: 0.45 10*3/MM3 (ref 0.1–0.9)
MONOCYTES NFR BLD AUTO: 18.2 % (ref 5–12)
NEUTROPHILS NFR BLD AUTO: 0.99 10*3/MM3 (ref 1.7–7)
NEUTROPHILS NFR BLD AUTO: 40.1 % (ref 42.7–76)
PLATELET # BLD AUTO: 310 10*3/MM3 (ref 140–450)
PMV BLD AUTO: 8.8 FL (ref 6–12)
POTASSIUM SERPL-SCNC: 2.7 MMOL/L (ref 3.5–5.2)
PROT SERPL-MCNC: 6.6 G/DL (ref 6–8.5)
RBC # BLD AUTO: 2.57 10*6/MM3 (ref 3.77–5.28)
SODIUM SERPL-SCNC: 136 MMOL/L (ref 136–145)
T4 FREE SERPL-MCNC: 0.26 NG/DL (ref 0.93–1.7)
TSH SERPL DL<=0.05 MIU/L-ACNC: 28.44 UIU/ML (ref 0.27–4.2)
WBC NRBC COR # BLD AUTO: 2.47 10*3/MM3 (ref 3.4–10.8)

## 2024-02-22 PROCEDURE — 84439 ASSAY OF FREE THYROXINE: CPT | Performed by: OBSTETRICS & GYNECOLOGY

## 2024-02-22 PROCEDURE — 85025 COMPLETE CBC W/AUTO DIFF WBC: CPT | Performed by: OBSTETRICS & GYNECOLOGY

## 2024-02-22 PROCEDURE — 80053 COMPREHEN METABOLIC PANEL: CPT | Performed by: OBSTETRICS & GYNECOLOGY

## 2024-02-22 PROCEDURE — 96360 HYDRATION IV INFUSION INIT: CPT

## 2024-02-22 PROCEDURE — 25810000003 SODIUM CHLORIDE 0.9 % SOLUTION: Performed by: OBSTETRICS & GYNECOLOGY

## 2024-02-22 PROCEDURE — 84443 ASSAY THYROID STIM HORMONE: CPT | Performed by: OBSTETRICS & GYNECOLOGY

## 2024-02-22 RX ORDER — METHYLPREDNISOLONE 4 MG/1
TABLET ORAL
Qty: 21 TABLET | Refills: 0 | Status: SHIPPED | OUTPATIENT
Start: 2024-02-22

## 2024-02-22 RX ORDER — ONDANSETRON 2 MG/ML
16 INJECTION INTRAMUSCULAR; INTRAVENOUS ONCE
Status: CANCELLED | OUTPATIENT
Start: 2024-02-22

## 2024-02-22 RX ADMIN — SODIUM CHLORIDE 1000 ML: 9 INJECTION, SOLUTION INTRAVENOUS at 14:20

## 2024-02-22 NOTE — TELEPHONE ENCOUNTER
I called patient to discuss low counts.  No treatment tomorrow.  F/U 1 week.  Continue Medrol dose pac as directed.  Electronically signed by Latasha Farr MD, 02/22/24, 4:47 PM EST.

## 2024-02-22 NOTE — TELEPHONE ENCOUNTER
RN received a call from patient reporting a continuation of diarrhea despite taking both lomotil and imodium. Patient reports having 5 liquid stools per day for the past two days. RN discussed the case with APRN and a supplemental fluid plan was added to get the patient a liter of fluids today. Steroids were also added to counteract the side effects of chemo if that is causing the diarrhea.  RN informed patient of both treatments. Patient verbalized understanding.

## 2024-02-26 ENCOUNTER — LAB (OUTPATIENT)
Dept: LAB | Facility: HOSPITAL | Age: 74
End: 2024-02-26
Payer: MEDICARE

## 2024-02-26 DIAGNOSIS — C54.1 ENDOMETRIAL CANCER: ICD-10-CM

## 2024-02-26 DIAGNOSIS — R19.7 DIARRHEA, UNSPECIFIED TYPE: Primary | ICD-10-CM

## 2024-02-26 DIAGNOSIS — T80.82XS COMPLICATION OF IMMUNE EFFECTOR CELLULAR THERAPY, SEQUELA: ICD-10-CM

## 2024-02-26 DIAGNOSIS — R19.7 DIARRHEA, UNSPECIFIED TYPE: ICD-10-CM

## 2024-02-26 LAB

## 2024-02-26 PROCEDURE — 87507 IADNA-DNA/RNA PROBE TQ 12-25: CPT

## 2024-02-26 RX ORDER — PREDNISONE 5 MG/1
TABLET ORAL
Qty: 90 TABLET | Refills: 0 | Status: SHIPPED | OUTPATIENT
Start: 2024-02-26 | End: 2024-03-12

## 2024-02-28 DIAGNOSIS — C54.1 ENDOMETRIAL CANCER: Primary | ICD-10-CM

## 2024-02-29 ENCOUNTER — HOSPITAL ENCOUNTER (OUTPATIENT)
Dept: ONCOLOGY | Facility: HOSPITAL | Age: 74
Discharge: HOME | End: 2024-02-29
Admitting: OBSTETRICS & GYNECOLOGY
Payer: MEDICARE

## 2024-02-29 ENCOUNTER — TELEPHONE (OUTPATIENT)
Dept: GYNECOLOGIC ONCOLOGY | Facility: CLINIC | Age: 74
End: 2024-02-29
Payer: MEDICARE

## 2024-02-29 DIAGNOSIS — C54.1 ENDOMETRIAL CANCER: ICD-10-CM

## 2024-02-29 LAB
ALBUMIN SERPL-MCNC: 4.5 G/DL (ref 3.5–5.2)
ALBUMIN/GLOB SERPL: 1.9 G/DL
ALP SERPL-CCNC: 101 U/L (ref 39–117)
ALT SERPL W P-5'-P-CCNC: 59 U/L (ref 1–33)
ANION GAP SERPL CALCULATED.3IONS-SCNC: 11 MMOL/L (ref 5–15)
AST SERPL-CCNC: 58 U/L (ref 1–32)
BASOPHILS # BLD AUTO: 0.02 10*3/MM3 (ref 0–0.2)
BASOPHILS NFR BLD AUTO: 0.2 % (ref 0–1.5)
BILIRUB SERPL-MCNC: 0.3 MG/DL (ref 0–1.2)
BUN SERPL-MCNC: 19 MG/DL (ref 8–23)
BUN/CREAT SERPL: 31.7 (ref 7–25)
CALCIUM SPEC-SCNC: 9.4 MG/DL (ref 8.6–10.5)
CANCER AG125 SERPL QL: 26.7 U/ML (ref 0–38.1)
CHLORIDE SERPL-SCNC: 97 MMOL/L (ref 98–107)
CO2 SERPL-SCNC: 25 MMOL/L (ref 22–29)
CREAT SERPL-MCNC: 0.6 MG/DL (ref 0.57–1)
DEPRECATED RDW RBC AUTO: 65.4 FL (ref 37–54)
EGFRCR SERPLBLD CKD-EPI 2021: 95.5 ML/MIN/1.73
EOSINOPHIL # BLD AUTO: 0.01 10*3/MM3 (ref 0–0.4)
EOSINOPHIL NFR BLD AUTO: 0.1 % (ref 0.3–6.2)
ERYTHROCYTE [DISTWIDTH] IN BLOOD BY AUTOMATED COUNT: 18.3 % (ref 12.3–15.4)
GLOBULIN UR ELPH-MCNC: 2.4 GM/DL
GLUCOSE SERPL-MCNC: 113 MG/DL (ref 65–99)
HCT VFR BLD AUTO: 30.1 % (ref 34–46.6)
HGB BLD-MCNC: 10.4 G/DL (ref 12–15.9)
IMM GRANULOCYTES # BLD AUTO: 0.06 10*3/MM3 (ref 0–0.05)
IMM GRANULOCYTES NFR BLD AUTO: 0.7 % (ref 0–0.5)
LYMPHOCYTES # BLD AUTO: 1.35 10*3/MM3 (ref 0.7–3.1)
LYMPHOCYTES NFR BLD AUTO: 16.3 % (ref 19.6–45.3)
MCH RBC QN AUTO: 34.6 PG (ref 26.6–33)
MCHC RBC AUTO-ENTMCNC: 34.6 G/DL (ref 31.5–35.7)
MCV RBC AUTO: 100 FL (ref 79–97)
MONOCYTES # BLD AUTO: 0.75 10*3/MM3 (ref 0.1–0.9)
MONOCYTES NFR BLD AUTO: 9 % (ref 5–12)
NEUTROPHILS NFR BLD AUTO: 6.1 10*3/MM3 (ref 1.7–7)
NEUTROPHILS NFR BLD AUTO: 73.7 % (ref 42.7–76)
PLATELET # BLD AUTO: 404 10*3/MM3 (ref 140–450)
PMV BLD AUTO: 8.8 FL (ref 6–12)
POTASSIUM SERPL-SCNC: 3.3 MMOL/L (ref 3.5–5.2)
PROT SERPL-MCNC: 6.9 G/DL (ref 6–8.5)
RBC # BLD AUTO: 3.01 10*6/MM3 (ref 3.77–5.28)
SODIUM SERPL-SCNC: 133 MMOL/L (ref 136–145)
WBC NRBC COR # BLD AUTO: 8.29 10*3/MM3 (ref 3.4–10.8)

## 2024-02-29 PROCEDURE — 36415 COLL VENOUS BLD VENIPUNCTURE: CPT

## 2024-02-29 PROCEDURE — 86304 IMMUNOASSAY TUMOR CA 125: CPT | Performed by: OBSTETRICS & GYNECOLOGY

## 2024-02-29 PROCEDURE — 80053 COMPREHEN METABOLIC PANEL: CPT | Performed by: OBSTETRICS & GYNECOLOGY

## 2024-02-29 PROCEDURE — 85025 COMPLETE CBC W/AUTO DIFF WBC: CPT | Performed by: OBSTETRICS & GYNECOLOGY

## 2024-02-29 NOTE — PROGRESS NOTES
Cheryl Short Storm  [id number]  1950      Reason for visit: Endometrial cancer, toxicity assessment, consideration of treatment    History of present illness:  The patient is a 73 y.o. year old female who presents today for treatment and evaluation of the above issues.    Patient presents today for consideration of cycle #6 of combined carboplatin/paclitaxel/dostarlimab-gxly. This was postponed due to neutropenia. Treatment thus far has been complicated by elevated AST/ALT leading to hold of dostarlimab at cycle #2 and cycle #3 being delayed due to neutropenia. Hepatitis panel negative and hepatic function panel is being followed. Presents today for consideration of cycle 6.    Energy:  greatly improved as labs have gotten better; over the past 5 days.  Diarrhea:  still about 5/day; unable to leave house.    Eye issues:  some erythema.  Scratched eye with eye drop applicator, nothing yesterday, just today  Rash resolved    Oncologic History:  Oncology/Hematology History   Endometrial cancer   8/8/2023 Initial Diagnosis    Patient to Dr. Felicia Botello with c/o postmenopausal bleeding. TVUS showed thickened endometrial stripe. EMB performed. Pathology showed serous endometrial cancer.  Referred to Gyn Oncology.   (Evaluation delayed due to social and insurance issues)     9/29/2023 Cancer Staged    Staging form: Corpus Uteri - Carcinoma And Carcinosarcoma, AJCC 8th Edition  - Clinical stage from 9/29/2023: FIGO Stage I (cT1, cN0, cM0) - Signed by Latasha Farr MD on 9/29/2023     10/12/2023 Imaging    CT chest, abdomen, pelvis:  Irregular bilateral cystic adnexal findings are present, appearing somewhat solid and abnormally enhancing on the right measuring 4.3 cm and multilocular, with a stellate enhancing solid component on the left measuring 7.5 cm. Findings are suspicious for extension of or metastatic involvement from reported primary GYN neoplasm. Correlate with findings on reported upcoming  salpingo-oophorectomy. Otherwise no suspicious findings for more distant metastatic involvement in the chest, abdomen and pelvis.     10/16/2023 Surgery    Robotic-assisted total laparoscopic hysterectomy, bilateral salpingo-oophorectomy, sentinel pelvic lymph node dissection, left ureterolysis, and omentectomy by Dr. Latasha Farr at [facility].    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 No myometrial invasion identified. Lymphvascular invasion present, low. Left ovary involved by serous carcinoma (100% serous at the ovary). Left pelvic sentinel node negative. Right pelvic positive for isolated tumor cells.   pT3a, pN0(i+), cM0    Molecular testing:  p53 amplified in both components  ER negative in dedifferentiated component, positive in serous (30%, 2+)  HI focally positive (5%, 2+)  Her2 negative in didifferentiated component, strongly positive (3+) in the serous compenent  PD-L1 <1  MSI intact     10/23/2023 Cancer Staged    Staging form: Corpus Uteri - Carcinoma And Carcinosarcoma, AJCC 8th Edition  - Pathologic stage from 10/23/2023: FIGO Stage IIIA (pT3a, pN0(i+), cM0) - Signed by Kathleen Fung APRN on 11/14/2023 11/17/2023 -  Chemotherapy    OP ENDOMETRIAL Dostarlimab-gxly / CARBOplatin AUC=5 / PACLitaxel  - dostarlimab held at cycle #2 due to transaminitis  - cycle #3 delayed 1 week due to neutropenia     12/7/2023 Genetic Testing    Genetic testing NEGATIVE for pathogenic mutations in BRCA 1/2 and 34 other genes via [facility] panel.      2/22/2024 -  Chemotherapy    OP SUPPORTIVE HYDRATION + ANTIEMETICS           Past Medical History:   Diagnosis Date    Anxiety     Depression     Endometrial cancer 09/29/2023    Heartburn     Immunotherapy 12/28/2023       Past Surgical History:   Procedure Laterality Date    ORIF HUMERUS FRACTURE      20yrs ago    OTHER SURGICAL HISTORY Left     plate placed in great  toe    SINUS SURGERY      2022    TOTAL LAPAROSCOPIC HYSTERECTOMY SALPINGO OOPHORECTOMY Bilateral 10/16/2023    Procedure: INJECTION OF ICG DYE, TOTAL LAPAROSCOPIC HYSTERECTOMY BILATERAL SALPINGOOPHORECTOMY, SENTINEL PELVIC LYMPH NODE DISSECTION, LEFT URETEROLYSIS, OMENTECTOMY WITH DAVINCI ROBOT;  Surgeon: Latasha Farr MD;  Location: [facility];  Service: Robotics - DaVinci;  Laterality: Bilateral;       MEDICATIONS:    Current Outpatient Medications:     clonazePAM (KlonoPIN) 0.5 MG tablet, Take 1 tablet by mouth 3 (Three) Times a Day As Needed for Anxiety., Disp: 90 tablet, Rfl: 0    diphenoxylate-atropine (Lomotil) 2.5-0.025 MG per tablet, Take 1 tablet by mouth 2 (Two) Times a Day As Needed for Diarrhea., Disp: 30 tablet, Rfl: 0    docusate sodium 100 MG capsule, Take 2 capsules by mouth 2 (Two) Times a Day As Needed for Constipation., Disp: 90 capsule, Rfl: 3    escitalopram (LEXAPRO) 20 MG tablet, , Disp: , Rfl:     latanoprost (XALATAN) 0.005 % ophthalmic solution, Administer 1 drop to both eyes Daily., Disp: , Rfl:     levothyroxine (Synthroid) 100 MCG tablet, Take 1 tablet by mouth Daily., Disp: 30 tablet, Rfl: 1    ondansetron (ZOFRAN) 8 MG tablet, Take 1 tablet by mouth 3 (Three) Times a Day As Needed for Nausea or Vomiting., Disp: 30 tablet, Rfl: 5    predniSONE (DELTASONE) 5 MG tablet, Take 5 tablets by mouth twice daily for 3 days, THEN 4 tablets twice daily for 3 days,  3 tablets twice daily for 3 days, 2 tablets twice daily for 3 days, THEN 1 tablet twice daily for 3 days., Disp: 90 tablet, Rfl: 0    simvastatin (ZOCOR) 10 MG tablet, , Disp: , Rfl:     valACYclovir (VALTREX) 500 MG tablet, , Disp: , Rfl:     zolpidem (Ambien) 10 MG tablet, Take 1 tablet by mouth At Night As Needed for Sleep., Disp: 30 tablet, Rfl: 0     Allergies:  has No Known Allergies.    Social History:   Social History     Socioeconomic History    Marital status: Legally    Tobacco Use    Smoking status: Former      "Years: 10     Types: Cigarettes     Passive exposure: Past    Smokeless tobacco: Never    Tobacco comments:     Smoked in college. \"Bummed\" occasionally    Vaping Use    Vaping Use: Never used   Substance and Sexual Activity    Alcohol use: Not Currently     Comment: Stopped drinking alcohol about a year ago    Drug use: Never    Sexual activity: Not Currently     Partners: Male       Family History:    Family History   Problem Relation Age of Onset    Diabetes Father     Pancreatic cancer Father     Cancer Mother     No Known Problems Brother     Diabetes Brother     Pancreatic cancer Brother     No Known Problems Sister     No Known Problems Daughter     Breast cancer Neg Hx         no family hx       Health Maintenance:    Health Maintenance   Topic Date Due    DXA SCAN  Never done    COLORECTAL CANCER SCREENING  Never done    TDAP/TD VACCINES (1 - Tdap) Never done    RSV Vaccine - Adults (1 - 1-dose 60+ series) Never done    Pneumococcal Vaccine 65+ (1 of 1 - PCV) Never done    MAMMOGRAM  08/31/2018    ZOSTER VACCINE (2 of 2) 02/20/2021    INFLUENZA VACCINE  08/01/2023    COVID-19 Vaccine (3 - 2023-24 season) 09/01/2023    ANNUAL WELLNESS VISIT  Never done    HEPATITIS C SCREENING  Completed       Review of Systems:  Please refer to history of present illness.  Review of systems otherwise negative.    Physical Exam:  Vitals:    03/01/24 0852   BP: 152/73   Pulse: 50   Resp: 17   Temp: 97.1 °F (36.2 °C)   TempSrc: Temporal   SpO2: 100%   Weight: 54.2 kg (119 lb 6.4 oz)   Height: 162.6 cm (64.02\")   PainSc: 0-No pain         Body mass index is 20.48 kg/m².  Wt Readings from Last 3 Encounters:   03/01/24 54.2 kg (119 lb 6.4 oz)   02/22/24 53.1 kg (117 lb)   02/16/24 52.8 kg (116 lb 8 oz)     GENERAL: Alert, well-appearing female appearing her stated age who is in no apparent distress.   HEENT: Sclera anicteric. Head normocephalic, atraumatic. Mucus membranes moist.   NECK: Trachea midline, supple, without masses.  " No thyromegaly.   BREASTS: Deferred  CARDIOVASCULAR: Normal rate, regular rhythm, no murmurs, rubs, or gallops.  No peripheral edema.  RESPIRATORY: Clear to auscultation bilaterally, normal respiratory effort  BACK:  No CVA tenderness, no vertebral tenderness on palpation  GASTROINTESTINAL:  Abdomen is soft, non-tender, non-distended, no rebound or guarding, no masses, or hernias.   SKIN:  Warm, dry, well-perfused.  All visible areas intact.  No rashes, lesions, ulcers.  PSYCHIATRIC: AO x3, with appropriate affect, normal thought processes.  NEUROLOGIC: No focal deficits.  Moves extremities well.  MUSCULOSKELETAL: Normal gait and station.   EXTREMITIES:   No cyanosis, clubbing, symmetric.  LYMPHATICS:  No cervical or inguinal adenopathy noted.     PELVIC exam: Deferred    ECOG PS 0    PROCEDURES: None    Diagnostic Data:    CT Chest With Contrast Diagnostic    Result Date: 10/12/2023  Impression: Irregular bilateral cystic adnexal findings are present, appearing somewhat solid and abnormally enhancing on the right measuring 4.3 cm and multilocular, with a stellate enhancing solid component on the left measuring 7.5 cm. Findings are suspicious for  extension of or metastatic involvement from reported primary GYN neoplasm. Correlate with findings on reported upcoming salpingo-oophorectomy. Otherwise no suspicious findings for more distant metastatic involvement in the chest, abdomen and pelvis. Electronically Signed: Yves Franks MD  10/12/2023 11:13 AM EDT  Workstation ID: [id number]    CT Abdomen Pelvis With Contrast    Result Date: 10/12/2023  Impression: Irregular bilateral cystic adnexal findings are present, appearing somewhat solid and abnormally enhancing on the right measuring 4.3 cm and multilocular, with a stellate enhancing solid component on the left measuring 7.5 cm. Findings are suspicious for  extension of or metastatic involvement from reported primary GYN neoplasm. Correlate with findings on reported  upcoming salpingo-oophorectomy. Otherwise no suspicious findings for more distant metastatic involvement in the chest, abdomen and pelvis. Electronically Signed: Yves Franks MD  10/12/2023 11:13 AM EDT  Workstation ID: [id number]      Lab Results   Component Value Date    WBC 8.29 02/29/2024    HGB 10.4 (L) 02/29/2024    HCT 30.1 (L) 02/29/2024    .0 (H) 02/29/2024     02/29/2024    NEUTROABS 6.10 02/29/2024    GLUCOSE 113 (H) 02/29/2024    BUN 19 02/29/2024    CREATININE 0.60 02/29/2024     (L) 02/29/2024    K 3.3 (L) 02/29/2024    CL 97 (L) 02/29/2024    CO2 25.0 02/29/2024    CALCIUM 9.4 02/29/2024    ALBUMIN 4.5 02/29/2024    AST 58 (H) 02/29/2024    ALT 59 (H) 02/29/2024    BILITOT 0.3 02/29/2024     Lab Results   Component Value Date     26.7 02/29/2024     25.2 02/15/2024     32.4 01/26/2024     31.7 12/28/2023     39.3 (H) 12/07/2023     47.7 (H) 11/14/2023     116.0 (H) 09/29/2023         Lab Results   Component Value Date    TSH 28.440 (H) 02/22/2024    TSH 29.250 (H) 02/15/2024    TSH 2.290 01/26/2024    FREET4 0.26 (L) 02/22/2024    FREET4 0.20 (L) 02/15/2024    FREET4 1.09 01/26/2024         Assessment & Plan   This is a 73 y.o. woman who presents for treatment of endometrial cancer presenting for consideration for treatment.    Encounter Diagnoses   Name Primary?    Endometrial cancer Yes    Chemotherapy-induced neutropenia     Immunotherapy     Transaminitis     Hypothyroidism due to medication        Stage III endometrial cancer due to adnexal involvement  - Patient presents for cycle #6 of combined carboplatin/paclitaxel/dostarlimab for dedifferentiated/serous adenocarcinoma of the endometrium  - Tolerating treatment well with anticipated side effects  - dostarlimab hold with cycle #2 due to elevated AST and ALT >3x ULN but < 10x  - Cycle #3 delayed 1 week due to neutropenia  - Cycle 5 delayed due to neutropenia, OK to proceed today   - hold  dostarlimab   -  normalized     Chemotherapy-induced neutropenia  -Noted 12/28, cycle 3 held  -Labs 2/29 with ANC 1.35, WBC 8.29; resolved    Hypothyroid due to immunotherapy  On synthroid- make sure lab recheck in     Transaminitis  -Grade 2, asymptomatic noted at cycle 2  -Per ASCO guidelines, dostarlimab held at cycle 2  -Hepatitis panel returned negative.    Anxiety/Depression  -Related to generalized worries and depressed mood, worsening after holidays and with getting treatment held. No SI.  -Referral to [facility] made, patient cancelled as she feels she is doing somewhat better.  -Previously started klonopin 0.5mg PRN as there is generalized anxiety component    Eye infection, bilateral  - Has seen ophthalmologist Dr. Collier, on steroid eye drops. He does not think this is related to chemotherapy.  - Last visit 1/25/25, added new eye drop for pressures and antibiotic eye drop.     Rash: resolved  Drug/immunotherapy induced diarrhea: improving, on steroid taper, hold dostarlimab 3/1/24; GI panel and C. Diff negative; imodium, lomotil, diet modification encouraged    Pain assessment was performed today as a part of patient’s care.  For patients with pain related to surgery, gynecologic malignancy or cancer treatment, the plan is as noted in the assessment/plan.  For patients with pain not related to these issues, they are to seek any further needed care from a more appropriate provider, such as PCP.      No orders of the defined types were placed in this encounter.    FOLLOW UP: 3 weeks    Zahra Rebolledo MD   Resident Physician, PGY-3  Gynecologic Oncology    I spent 40 minutes caring for Cheryl on this date of service. This time includes time spent by me in the following activities: preparing for the visit, reviewing tests, performing a medically appropriate examination and/or evaluation, counseling and educating the patient/family/caregiver, ordering medications, tests, or procedures, referring and  communicating with other health care professionals, documenting information in the medical record, and care coordination    Patient was seen and examined with Dr. Rebolledo,  resident, who performed portions of the examination and documentation for this patient's care under my direct supervision.  I agree with the above documentation and plan.    Latasha Farr MD  03/01/24  09:23 EST

## 2024-02-29 NOTE — TELEPHONE ENCOUNTER
RN called patient and relayed message from MD that her labs are good for treatment tomorrow. Patient verbalized understanding.

## 2024-03-01 ENCOUNTER — HOSPITAL ENCOUNTER (OUTPATIENT)
Dept: ONCOLOGY | Facility: HOSPITAL | Age: 74
Discharge: HOME | End: 2024-03-01
Payer: MEDICARE

## 2024-03-01 ENCOUNTER — OFFICE VISIT (OUTPATIENT)
Dept: GYNECOLOGIC ONCOLOGY | Facility: CLINIC | Age: 74
End: 2024-03-01
Payer: MEDICARE

## 2024-03-01 VITALS — DIASTOLIC BLOOD PRESSURE: 68 MMHG | RESPIRATION RATE: 18 BRPM | SYSTOLIC BLOOD PRESSURE: 128 MMHG | HEART RATE: 58 BPM

## 2024-03-01 VITALS
TEMPERATURE: 97.1 F | SYSTOLIC BLOOD PRESSURE: 152 MMHG | OXYGEN SATURATION: 100 % | BODY MASS INDEX: 20.38 KG/M2 | HEIGHT: 64 IN | WEIGHT: 119.4 LBS | RESPIRATION RATE: 17 BRPM | HEART RATE: 50 BPM | DIASTOLIC BLOOD PRESSURE: 73 MMHG

## 2024-03-01 DIAGNOSIS — C54.1 ENDOMETRIAL CANCER: Primary | ICD-10-CM

## 2024-03-01 DIAGNOSIS — D70.1 CHEMOTHERAPY-INDUCED NEUTROPENIA: ICD-10-CM

## 2024-03-01 DIAGNOSIS — Z29.89 IMMUNOTHERAPY: ICD-10-CM

## 2024-03-01 DIAGNOSIS — R74.01 TRANSAMINITIS: ICD-10-CM

## 2024-03-01 DIAGNOSIS — E03.2 HYPOTHYROIDISM DUE TO MEDICATION: ICD-10-CM

## 2024-03-01 DIAGNOSIS — T45.1X5A CHEMOTHERAPY-INDUCED NEUTROPENIA: ICD-10-CM

## 2024-03-01 PROCEDURE — 96413 CHEMO IV INFUSION 1 HR: CPT

## 2024-03-01 PROCEDURE — 96375 TX/PRO/DX INJ NEW DRUG ADDON: CPT

## 2024-03-01 PROCEDURE — 25010000002 PALONOSETRON PER 25 MCG: Performed by: OBSTETRICS & GYNECOLOGY

## 2024-03-01 PROCEDURE — 96415 CHEMO IV INFUSION ADDL HR: CPT

## 2024-03-01 PROCEDURE — 96367 TX/PROPH/DG ADDL SEQ IV INF: CPT

## 2024-03-01 PROCEDURE — 25010000002 CARBOPLATIN PER 50 MG: Performed by: OBSTETRICS & GYNECOLOGY

## 2024-03-01 PROCEDURE — 25810000003 SODIUM CHLORIDE 0.9 % SOLUTION: Performed by: OBSTETRICS & GYNECOLOGY

## 2024-03-01 PROCEDURE — 25010000002 FOSAPREPITANT PER 1 MG: Performed by: OBSTETRICS & GYNECOLOGY

## 2024-03-01 PROCEDURE — 25010000002 DEXAMETHASONE PER 1 MG: Performed by: OBSTETRICS & GYNECOLOGY

## 2024-03-01 PROCEDURE — 25010000002 PACLITAXEL PER 1 MG: Performed by: OBSTETRICS & GYNECOLOGY

## 2024-03-01 PROCEDURE — 25810000003 SODIUM CHLORIDE 0.9 % SOLUTION 250 ML FLEX CONT: Performed by: OBSTETRICS & GYNECOLOGY

## 2024-03-01 PROCEDURE — 25810000003 SODIUM CHLORIDE 0.9 % SOLUTION 500 ML FLEX CONT: Performed by: OBSTETRICS & GYNECOLOGY

## 2024-03-01 PROCEDURE — 96417 CHEMO IV INFUS EACH ADDL SEQ: CPT

## 2024-03-01 RX ORDER — PALONOSETRON 0.05 MG/ML
0.25 INJECTION, SOLUTION INTRAVENOUS ONCE
Status: COMPLETED | OUTPATIENT
Start: 2024-03-01 | End: 2024-03-01

## 2024-03-01 RX ORDER — SODIUM CHLORIDE 9 MG/ML
20 INJECTION, SOLUTION INTRAVENOUS ONCE
Status: CANCELLED | OUTPATIENT
Start: 2024-03-01

## 2024-03-01 RX ORDER — CETIRIZINE HYDROCHLORIDE 10 MG/1
10 TABLET ORAL DAILY
Status: DISCONTINUED | OUTPATIENT
Start: 2024-03-01 | End: 2024-03-02 | Stop reason: HOSPADM

## 2024-03-01 RX ORDER — FAMOTIDINE 10 MG/ML
20 INJECTION, SOLUTION INTRAVENOUS ONCE
Status: COMPLETED | OUTPATIENT
Start: 2024-03-01 | End: 2024-03-01

## 2024-03-01 RX ORDER — SODIUM CHLORIDE 9 MG/ML
20 INJECTION, SOLUTION INTRAVENOUS ONCE
Status: COMPLETED | OUTPATIENT
Start: 2024-03-01 | End: 2024-03-01

## 2024-03-01 RX ADMIN — SODIUM CHLORIDE 150 MG: 9 INJECTION, SOLUTION INTRAVENOUS at 10:21

## 2024-03-01 RX ADMIN — SODIUM CHLORIDE 20 ML/HR: 9 INJECTION, SOLUTION INTRAVENOUS at 10:20

## 2024-03-01 RX ADMIN — POLYVINYL ALCOHOL, POVIDONE 2 DROP: 14; 6 SOLUTION/ DROPS OPHTHALMIC at 12:08

## 2024-03-01 RX ADMIN — FAMOTIDINE 20 MG: 10 INJECTION, SOLUTION INTRAVENOUS at 10:21

## 2024-03-01 RX ADMIN — SODIUM CHLORIDE 220 MG: 9 INJECTION, SOLUTION INTRAVENOUS at 11:12

## 2024-03-01 RX ADMIN — CETIRIZINE HYDROCHLORIDE 10 MG: 10 TABLET, FILM COATED ORAL at 10:21

## 2024-03-01 RX ADMIN — PALONOSETRON HYDROCHLORIDE 0.25 MG: 0.25 INJECTION INTRAVENOUS at 10:21

## 2024-03-01 RX ADMIN — DEXAMETHASONE SODIUM PHOSPHATE 20 MG: 4 INJECTION INTRA-ARTICULAR; INTRALESIONAL; INTRAMUSCULAR; INTRAVENOUS; SOFT TISSUE at 10:21

## 2024-03-01 RX ADMIN — CARBOPLATIN 350 MG: 10 INJECTION INTRAVENOUS at 14:13

## 2024-03-06 ENCOUNTER — TELEPHONE (OUTPATIENT)
Dept: GYNECOLOGIC ONCOLOGY | Facility: CLINIC | Age: 74
End: 2024-03-06
Payer: MEDICARE

## 2024-03-06 DIAGNOSIS — C54.1 ENDOMETRIAL CANCER: Primary | ICD-10-CM

## 2024-03-06 NOTE — TELEPHONE ENCOUNTER
RN called patient back in response to her questions concerning her thyroid medication. RN reviewed her labs with the provider and was instructed to tell patient that she should continue her thyroid medication as ordered and we will redraw labs with next cycle of treatment. Patient was given this message and patient verbalized understanding.

## 2024-03-21 ENCOUNTER — HOSPITAL ENCOUNTER (OUTPATIENT)
Dept: ONCOLOGY | Facility: HOSPITAL | Age: 74
Discharge: HOME | End: 2024-03-21
Admitting: OBSTETRICS & GYNECOLOGY
Payer: MEDICARE

## 2024-03-21 ENCOUNTER — TELEPHONE (OUTPATIENT)
Dept: ONCOLOGY | Facility: CLINIC | Age: 74
End: 2024-03-21
Payer: MEDICARE

## 2024-03-21 VITALS
TEMPERATURE: 97.8 F | BODY MASS INDEX: 19.79 KG/M2 | SYSTOLIC BLOOD PRESSURE: 124 MMHG | WEIGHT: 115.9 LBS | HEIGHT: 64 IN | DIASTOLIC BLOOD PRESSURE: 66 MMHG | HEART RATE: 82 BPM | RESPIRATION RATE: 16 BRPM

## 2024-03-21 DIAGNOSIS — E34.2: Primary | ICD-10-CM

## 2024-03-21 DIAGNOSIS — C54.1 ENDOMETRIAL CANCER: Primary | ICD-10-CM

## 2024-03-21 DIAGNOSIS — C54.1 ENDOMETRIAL CANCER: ICD-10-CM

## 2024-03-21 LAB
ALBUMIN SERPL-MCNC: 4.2 G/DL (ref 3.5–5.2)
ALBUMIN/GLOB SERPL: 1.4 G/DL
ALP SERPL-CCNC: 108 U/L (ref 39–117)
ALT SERPL W P-5'-P-CCNC: 62 U/L (ref 1–33)
ANION GAP SERPL CALCULATED.3IONS-SCNC: 11 MMOL/L (ref 5–15)
AST SERPL-CCNC: 68 U/L (ref 1–32)
BASOPHILS # BLD AUTO: 0.01 10*3/MM3 (ref 0–0.2)
BASOPHILS NFR BLD AUTO: 0.4 % (ref 0–1.5)
BILIRUB SERPL-MCNC: 0.3 MG/DL (ref 0–1.2)
BUN SERPL-MCNC: 10 MG/DL (ref 8–23)
BUN/CREAT SERPL: 16.9 (ref 7–25)
CALCIUM SPEC-SCNC: 9.5 MG/DL (ref 8.6–10.5)
CANCER AG125 SERPL QL: 41.5 U/ML (ref 0–38.1)
CHLORIDE SERPL-SCNC: 89 MMOL/L (ref 98–107)
CO2 SERPL-SCNC: 29 MMOL/L (ref 22–29)
CREAT SERPL-MCNC: 0.59 MG/DL (ref 0.57–1)
DEPRECATED RDW RBC AUTO: 58.9 FL (ref 37–54)
EGFRCR SERPLBLD CKD-EPI 2021: 95.3 ML/MIN/1.73
EOSINOPHIL # BLD AUTO: 0.07 10*3/MM3 (ref 0–0.4)
EOSINOPHIL NFR BLD AUTO: 2.9 % (ref 0.3–6.2)
ERYTHROCYTE [DISTWIDTH] IN BLOOD BY AUTOMATED COUNT: 16.2 % (ref 12.3–15.4)
GLOBULIN UR ELPH-MCNC: 2.9 GM/DL
GLUCOSE SERPL-MCNC: 119 MG/DL (ref 65–99)
HCT VFR BLD AUTO: 31.1 % (ref 34–46.6)
HGB BLD-MCNC: 11 G/DL (ref 12–15.9)
IMM GRANULOCYTES # BLD AUTO: 0.01 10*3/MM3 (ref 0–0.05)
IMM GRANULOCYTES NFR BLD AUTO: 0.4 % (ref 0–0.5)
LYMPHOCYTES # BLD AUTO: 0.91 10*3/MM3 (ref 0.7–3.1)
LYMPHOCYTES NFR BLD AUTO: 37.8 % (ref 19.6–45.3)
MCH RBC QN AUTO: 34.8 PG (ref 26.6–33)
MCHC RBC AUTO-ENTMCNC: 35.4 G/DL (ref 31.5–35.7)
MCV RBC AUTO: 98.4 FL (ref 79–97)
MONOCYTES # BLD AUTO: 0.39 10*3/MM3 (ref 0.1–0.9)
MONOCYTES NFR BLD AUTO: 16.2 % (ref 5–12)
NEUTROPHILS NFR BLD AUTO: 1.02 10*3/MM3 (ref 1.7–7)
NEUTROPHILS NFR BLD AUTO: 42.3 % (ref 42.7–76)
PLATELET # BLD AUTO: 251 10*3/MM3 (ref 140–450)
PMV BLD AUTO: 8.9 FL (ref 6–12)
POTASSIUM SERPL-SCNC: 2.6 MMOL/L (ref 3.5–5.2)
PROT SERPL-MCNC: 7.1 G/DL (ref 6–8.5)
RBC # BLD AUTO: 3.16 10*6/MM3 (ref 3.77–5.28)
SODIUM SERPL-SCNC: 129 MMOL/L (ref 136–145)
T4 FREE SERPL-MCNC: 0.54 NG/DL (ref 0.92–1.68)
TSH SERPL DL<=0.05 MIU/L-ACNC: 26.03 UIU/ML (ref 0.27–4.2)
WBC NRBC COR # BLD AUTO: 2.41 10*3/MM3 (ref 3.4–10.8)

## 2024-03-21 PROCEDURE — 80053 COMPREHEN METABOLIC PANEL: CPT | Performed by: OBSTETRICS & GYNECOLOGY

## 2024-03-21 PROCEDURE — 86304 IMMUNOASSAY TUMOR CA 125: CPT | Performed by: OBSTETRICS & GYNECOLOGY

## 2024-03-21 PROCEDURE — 84443 ASSAY THYROID STIM HORMONE: CPT | Performed by: OBSTETRICS & GYNECOLOGY

## 2024-03-21 PROCEDURE — 84439 ASSAY OF FREE THYROXINE: CPT | Performed by: OBSTETRICS & GYNECOLOGY

## 2024-03-21 PROCEDURE — 36415 COLL VENOUS BLD VENIPUNCTURE: CPT

## 2024-03-21 PROCEDURE — 85025 COMPLETE CBC W/AUTO DIFF WBC: CPT | Performed by: OBSTETRICS & GYNECOLOGY

## 2024-03-21 RX ORDER — POTASSIUM CHLORIDE 750 MG/1
20 TABLET, FILM COATED, EXTENDED RELEASE ORAL 2 TIMES DAILY
Qty: 10 TABLET | Refills: 0 | Status: SHIPPED | OUTPATIENT
Start: 2024-03-21

## 2024-03-21 RX ORDER — ONDANSETRON 2 MG/ML
16 INJECTION INTRAMUSCULAR; INTRAVENOUS ONCE
Status: CANCELLED | OUTPATIENT
Start: 2024-03-22

## 2024-03-21 NOTE — TELEPHONE ENCOUNTER
Results reported to MD at 1530.  Patient will be notified to either start taking K+ PO if asymptomatic, or go to ER if symptomatic.

## 2024-03-21 NOTE — TELEPHONE ENCOUNTER
Critical Test Results      MD: Latasha Farr MD    Date: 03/21/2024     Critical test result: Potassium 2.6     Time results received: 15: 25

## 2024-03-21 NOTE — PROGRESS NOTES
Cheryl Storm  [id number]  1950      Reason for visit: Endometrial cancer, toxicity assessment, consideration of treatment    History of present illness:  The patient is a 74 y.o. year old female who presents today for treatment and evaluation of the above issues.    Patient presents today for consideration of cycle #7 of combined carboplatin/paclitaxel/dostarlimab-gxly. Treatment thus far has been complicated by elevated AST/ALT leading to hold of dostarlimab at cycle #2 and cycle #3 being delayed due to neutropenia. Hepatitis panel negative and hepatic function panel is being followed. Presents today for consideration of cycle 7.    Yesterday, labs demonstrated stable transaminitis (AST 62/ALT 68 from 58/59 3 weeks ago). Patient was severely hypokalemic with K 2.6, asymptomatic, prescription for potassium supplementation sent.     Today patient is feeling poor today. Her biggest complaint is eye irritation, R >L. She notes that she has had erythema and irritation of both eyes for 3 months. Irritation is getting worse. She feels like sand paper in both eyes, associated blurred vision, discharge. She has trouble driving and reading due to blurriness. States that she has to put lubrication drops at stop lights to continue driving. She denies any rhinorrhea or other allergy symptoms. Denies any fever/chills.     She is also continuing to have diarrhea. Has had chronically for 3 months. Prior to 3 months was having 1-2 BM per day. Now having 8-10. Stool is watery, no hematochezia. She has tried BRAT diet and taking lomotil and immodium frequently.     Oncologic History:  Oncology/Hematology History   Endometrial cancer   8/8/2023 Initial Diagnosis    Patient to Dr. Felicia Botello with c/o postmenopausal bleeding. TVUS showed thickened endometrial stripe. EMB performed. Pathology showed serous endometrial cancer.  Referred to Gyn Oncology.   (Evaluation delayed due to social and insurance issues)     9/29/2023  Cancer Staged    Staging form: Corpus Uteri - Carcinoma And Carcinosarcoma, AJCC 8th Edition  - Clinical stage from 9/29/2023: FIGO Stage I (cT1, cN0, cM0) - Signed by Latasha Farr MD on 9/29/2023     10/12/2023 Imaging    CT chest, abdomen, pelvis:  Irregular bilateral cystic adnexal findings are present, appearing somewhat solid and abnormally enhancing on the right measuring 4.3 cm and multilocular, with a stellate enhancing solid component on the left measuring 7.5 cm. Findings are suspicious for extension of or metastatic involvement from reported primary GYN neoplasm. Correlate with findings on reported upcoming salpingo-oophorectomy. Otherwise no suspicious findings for more distant metastatic involvement in the chest, abdomen and pelvis.     10/16/2023 Surgery    Robotic-assisted total laparoscopic hysterectomy, bilateral salpingo-oophorectomy, sentinel pelvic lymph node dissection, left ureterolysis, and omentectomy by Dr. Latasha Farr at [facility].    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 No myometrial invasion identified. Lymphvascular invasion present, low. Left ovary involved by serous carcinoma (100% serous at the ovary). Left pelvic sentinel node negative. Right pelvic positive for isolated tumor cells.   pT3a, pN0(i+), cM0    Molecular testing:  p53 amplified in both components  ER negative in dedifferentiated component, positive in serous (30%, 2+)  RI focally positive (5%, 2+)  Her2 negative in didifferentiated component, strongly positive (3+) in the serous compenent  PD-L1 <1  MSI intact     10/23/2023 Cancer Staged    Staging form: Corpus Uteri - Carcinoma And Carcinosarcoma, AJCC 8th Edition  - Pathologic stage from 10/23/2023: FIGO Stage IIIA (pT3a, pN0(i+), cM0) - Signed by Kathleen Fung APRN on 11/14/2023 11/17/2023 -  Chemotherapy    OP ENDOMETRIAL Dostarlimab-gxly / CARBOplatin  AUC=5 / PACLitaxel  - dostarlimab held at cycle #2 due to transaminitis  - cycle #3 delayed 1 week due to neutropenia     12/7/2023 Genetic Testing    Genetic testing NEGATIVE for pathogenic mutations in BRCA 1/2 and 34 other genes via CancerNext panel.      2/22/2024 -  Chemotherapy    OP SUPPORTIVE HYDRATION + ANTIEMETICS           Past Medical History:   Diagnosis Date    Anxiety     Depression     Endometrial cancer 09/29/2023    Heartburn     Immunotherapy 12/28/2023       Past Surgical History:   Procedure Laterality Date    ORIF HUMERUS FRACTURE      20yrs ago    OTHER SURGICAL HISTORY Left     plate placed in great toe    SINUS SURGERY      2022    TOTAL LAPAROSCOPIC HYSTERECTOMY SALPINGO OOPHORECTOMY Bilateral 10/16/2023    Procedure: INJECTION OF ICG DYE, TOTAL LAPAROSCOPIC HYSTERECTOMY BILATERAL SALPINGOOPHORECTOMY, SENTINEL PELVIC LYMPH NODE DISSECTION, LEFT URETEROLYSIS, OMENTECTOMY WITH DAVINCI ROBOT;  Surgeon: Latasha Farr MD;  Location: [facility];  Service: Robotics - DaVinci;  Laterality: Bilateral;       MEDICATIONS:    Current Outpatient Medications:     clonazePAM (KlonoPIN) 0.5 MG tablet, Take 1 tablet by mouth 3 (Three) Times a Day As Needed for Anxiety., Disp: 90 tablet, Rfl: 0    diphenoxylate-atropine (Lomotil) 2.5-0.025 MG per tablet, Take 1 tablet by mouth 2 (Two) Times a Day As Needed for Diarrhea., Disp: 30 tablet, Rfl: 0    escitalopram (LEXAPRO) 20 MG tablet, , Disp: , Rfl:     latanoprost (XALATAN) 0.005 % ophthalmic solution, Administer 1 drop to both eyes Daily., Disp: , Rfl:     ondansetron (ZOFRAN) 8 MG tablet, Take 1 tablet by mouth 3 (Three) Times a Day As Needed for Nausea or Vomiting., Disp: 30 tablet, Rfl: 5    potassium chloride 10 MEQ CR tablet, Take 2 tablets by mouth 2 (Two) Times a Day., Disp: 10 tablet, Rfl: 0    simvastatin (ZOCOR) 10 MG tablet, , Disp: , Rfl:     valACYclovir (VALTREX) 500 MG tablet, , Disp: , Rfl:     zolpidem (Ambien) 10 MG tablet, Take 1  "tablet by mouth At Night As Needed for Sleep., Disp: 30 tablet, Rfl: 0    levothyroxine (Synthroid) 200 MCG tablet, Take 1 tablet by mouth Daily for 30 days., Disp: 30 tablet, Rfl: 0    polyethyl glycol-propyl glycol (Systane) 0.4-0.3 % solution ophthalmic solution (artificial tears), Administer 1 drop to both eyes Every 3 (Three) Hours As Needed (for dry or irritated eyes). Do not put in eyes within 10 minutes of prednisolone acetate., Disp: 10 mL, Rfl: 2    prednisoLONE acetate (PRED FORTE) 1 % ophthalmic suspension, Administer 1 drop to both eyes 4 (Four) Times a Day., Disp: 10 mL, Rfl: 1    predniSONE (DELTASONE) 5 MG tablet, Take 5 tablets by mouth 2 (Two) Times a Day for 3 days, THEN 4 tablets 2 (Two) Times a Day for 3 days, THEN 3 tablets 2 (Two) Times a Day for 3 days, THEN 2 tablets 2 (Two) Times a Day for 3 days, THEN 1 tablet 2 (Two) Times a Day for 3 days., Disp: 90 tablet, Rfl: 0  No current facility-administered medications for this visit.     Allergies:  has No Known Allergies.    Social History:   Social History     Socioeconomic History    Marital status: Legally    Tobacco Use    Smoking status: Former     Types: Cigarettes     Passive exposure: Past    Smokeless tobacco: Never    Tobacco comments:     Smoked in college. \"Bummed\" occasionally    Vaping Use    Vaping status: Never Used   Substance and Sexual Activity    Alcohol use: Not Currently     Comment: Stopped drinking alcohol about a year ago    Drug use: Never    Sexual activity: Not Currently     Partners: Male       Family History:    Family History   Problem Relation Age of Onset    Diabetes Father     Pancreatic cancer Father     Cancer Mother     No Known Problems Brother     Diabetes Brother     Pancreatic cancer Brother     No Known Problems Sister     No Known Problems Daughter     Breast cancer Neg Hx         no family hx       Health Maintenance:    Health Maintenance   Topic Date Due    DXA SCAN  Never done    COLORECTAL " "CANCER SCREENING  Never done    TDAP/TD VACCINES (1 - Tdap) Never done    RSV Vaccine - Adults (1 - 1-dose 60+ series) Never done    Pneumococcal Vaccine 65+ (1 of 1 - PCV) Never done    MAMMOGRAM  08/31/2018    ZOSTER VACCINE (2 of 2) 02/20/2021    INFLUENZA VACCINE  08/01/2023    COVID-19 Vaccine (3 - 2023-24 season) 09/01/2023    ANNUAL WELLNESS VISIT  Never done    HEPATITIS C SCREENING  Completed       Review of Systems:  Please refer to history of present illness.  Review of systems otherwise negative.    Physical Exam:  Vitals:    03/22/24 0823   BP: 138/66   Pulse: 70   Resp: 17   Temp: 97.8 °F (36.6 °C)   TempSrc: Temporal   SpO2: 96%   Weight: 52.6 kg (115 lb 15.4 oz)   Height: 162.6 cm (64.02\")   PainSc: 0-No pain           Body mass index is 19.89 kg/m².  Wt Readings from Last 3 Encounters:   03/22/24 52.6 kg (115 lb 15.4 oz)   03/21/24 52.6 kg (115 lb 14.4 oz)   03/01/24 54.2 kg (119 lb 6.4 oz)     GENERAL: Alert, chronically ill-appearing female appearing her stated age who is in no apparent distress.   HEENT: Bilateral conjunctival injection, erythema around bilateral eyes, no drainage. Head normocephalic, atraumatic. Mucus membranes moist.   NECK: Trachea midline, supple, without masses.  No thyromegaly.   BREASTS: Deferred  CARDIOVASCULAR: Normal rate, regular rhythm, no murmurs, rubs, or gallops.  No peripheral edema.  RESPIRATORY: Clear to auscultation bilaterally, normal respiratory effort  BACK:  No CVA tenderness, no vertebral tenderness on palpation  GASTROINTESTINAL:  Abdomen is soft, non-tender, non-distended, no rebound or guarding, no masses, or hernias.   SKIN:  Warm, dry, well-perfused.  All visible areas intact.  No rashes, lesions, ulcers.  PSYCHIATRIC: AO x3, with appropriate affect, normal thought processes.  NEUROLOGIC: No focal deficits.  Moves extremities well.  MUSCULOSKELETAL: Normal gait and station.   EXTREMITIES:   No cyanosis, clubbing, symmetric.  LYMPHATICS:  No cervical " or inguinal adenopathy noted.     PELVIC exam: Deferred    ECOG PS 0    PROCEDURES: None    Diagnostic Data:    CT Chest With Contrast Diagnostic    Result Date: 10/12/2023  Impression: Irregular bilateral cystic adnexal findings are present, appearing somewhat solid and abnormally enhancing on the right measuring 4.3 cm and multilocular, with a stellate enhancing solid component on the left measuring 7.5 cm. Findings are suspicious for  extension of or metastatic involvement from reported primary GYN neoplasm. Correlate with findings on reported upcoming salpingo-oophorectomy. Otherwise no suspicious findings for more distant metastatic involvement in the chest, abdomen and pelvis. Electronically Signed: Yves Franks MD  10/12/2023 11:13 AM EDT  Workstation ID: [id number]    CT Abdomen Pelvis With Contrast    Result Date: 10/12/2023  Impression: Irregular bilateral cystic adnexal findings are present, appearing somewhat solid and abnormally enhancing on the right measuring 4.3 cm and multilocular, with a stellate enhancing solid component on the left measuring 7.5 cm. Findings are suspicious for  extension of or metastatic involvement from reported primary GYN neoplasm. Correlate with findings on reported upcoming salpingo-oophorectomy. Otherwise no suspicious findings for more distant metastatic involvement in the chest, abdomen and pelvis. Electronically Signed: Yves Franks MD  10/12/2023 11:13 AM EDT  Workstation ID: [id number]      Lab Results   Component Value Date    WBC 2.41 (L) 03/21/2024    HGB 11.0 (L) 03/21/2024    HCT 31.1 (L) 03/21/2024    MCV 98.4 (H) 03/21/2024     03/21/2024    NEUTROABS 1.02 (L) 03/21/2024    GLUCOSE 119 (H) 03/21/2024    BUN 10 03/21/2024    CREATININE 0.59 03/21/2024     (L) 03/21/2024    K 2.6 (C) 03/21/2024    CL 89 (L) 03/21/2024    CO2 29.0 03/21/2024    CALCIUM 9.5 03/21/2024    ALBUMIN 4.2 03/21/2024    AST 68 (H) 03/21/2024    ALT 62 (H) 03/21/2024    BILITOT  0.3 03/21/2024     Lab Results   Component Value Date     41.5 (H) 03/21/2024     26.7 02/29/2024     25.2 02/15/2024     32.4 01/26/2024     31.7 12/28/2023     39.3 (H) 12/07/2023     47.7 (H) 11/14/2023     116.0 (H) 09/29/2023         Lab Results   Component Value Date    TSH 26.030 (H) 03/21/2024    TSH 28.440 (H) 02/22/2024    TSH 29.250 (H) 02/15/2024    FREET4 0.54 (L) 03/21/2024    FREET4 0.26 (L) 02/22/2024    FREET4 0.20 (L) 02/15/2024         Assessment & Plan   This is a 74 y.o. woman who presents for treatment of endometrial cancer presenting for consideration for treatment.    Encounter Diagnoses   Name Primary?    Watery diarrhea with hypokalemic alkalosis Yes    Immunosuppressive-induced eye inflammation     Endometrial cancer     Immunotherapy     Hypokalemia     Hypothyroidism due to medication          Stage III endometrial cancer due to adnexal involvement  - Patient presents for cycle #7 of combined carboplatin/paclitaxel/dostarlimab for dedifferentiated/serous adenocarcinoma of the endometrium  - treatment complicated by transaminitis, diarrhea, and now eye irritation   - dostarlimab hold with cycle #2 due to elevated AST and ALT >3x ULN but < 10x  - Cycle #3 delayed 1 week due to neutropenia  - Cycle 5 delayed due to neutropenia  - as below, significant diarrhea causing electrolyte derangements. Likely immunotoxicity, will hold dostarlimab and start high dose steroid taper.  -  normalized, but on labs yesterday unfortunately increased to 42  - will also hold chemotherapy today given neutropenia, dehydration  -High likelihood of no further treatment    Immunotherapy induced diarrhea   -baseline 1-2 BMs per day, for 3 months has had 8-10 episodes; patient has not been consistently forthcoming with these complaints making her management very challenging.  -failed immodium, BRAT diet   -has tried 2 short course of steroids without improvement   -GI  "panel and C. Diff negative previously  -will plan for long course (2 weeks) of steroid taper to see if that improves   -causing significant hypokalemia and dehydration, plan for solu-cortef with IVF today. Plan for IVF M, T, W next week with labs. If infusion unable to obtain IV access today, encouraged patient to present to the emergency department for admission     Opthlamamic irritation likely secondary to immunotherapy   -3 month hx of irritation, worsening   -symptoms include dryness, irritation, \"sand paper sensation\" along with erythema and blurred vision   -has been trying multiple different eye drops including drops for glaucoma. Discussed glaucoma drops unlikely to help  -has seen ophthalmologist, but would like second opinion, referral sent   -steroid taper as above as well as prescription sent for eye steroid drops     Chemotherapy-induced neutropenia  -Noted 12/28, cycle 3 and 5 delayed   -Had normalized on 2/29  -Worsening neutropenia with labs 3/21 with WBC 2.41, ANC 1.02   -Hold carboplatin/paclitaxel today due to neutropenia, may need to drop chemotherapy entirely from treatment plan given recurrent neutropenia      Hypothyroid due to immunotherapy  On synthroid 100 mcg daily, taking daily, but ran out 4 days ago.  TSH 26, Free T4 0.5 on 3/21   Increased synthroid to 200 mcg daily, encourage compliance     Transaminitis  -Grade 2, asymptomatic noted at cycle 2  -Per ASCO guidelines, dostarlimab held at cycle 2  -Hepatitis panel returned negative.  -Stable transaminitis (AST 62/ALT 68 from 58/59 3 weeks ago)    Hypokalemia   -K 2.6 on labs 3/21   -patient asymptomatic   -having diarrhea which is likely etiology   - PO supplementation sent yesterday   - recheck labs with IVF    Anxiety/Depression  -Related to generalized worries and depressed mood, worsening after holidays and with getting treatment held. No SI.  -Referral to [facility] made, patient cancelled as she feels she is doing somewhat " better.  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 He does not think this is related to chemotherapy.  - Last visit 1/25/25, added new eye drop for pressures and antibiotic eye drop.   - glaucoma eye drops, has tried 3 different drops without improvement.  However, patient cannot see very well at this point and is likely getting some of the medications confused.  She requested referral to a new ophthalmologist and this was placed.  She was prescribed regimen typically given for immunotherapy patients to prevent ocular toxicity and effort to get better control of the symptom.  In addition, oral steroids as noted above.    *Patient advised multiple times would be a low threshold for hospital admission.  If she feels better over the weekend at all she is to go to the hospital emergency room and be admitted.  I would anticipate she would undergo steroid administration with 1-2 mg/kg per day prednisone if hospital stay is required.  If she has not improved at all by next week, would anticipate inpatient hospital admission.  IV fluids Monday Tuesday Wednesday of next week.    Pain assessment was performed today as a part of patient’s care.  For patients with pain related to surgery, gynecologic malignancy or cancer treatment, the plan is as noted in the assessment/plan.  For patients with pain not related to these issues, they are to seek any further needed care from a more appropriate provider, such as PCP.      Orders Placed This Encounter   Procedures    Ambulatory Referral to Ophthalmology     Referral Priority:   Urgent     Referral Type:   Consultation     Referral Reason:   Specialty Services Required     Requested Specialty:   Ophthalmology     Number of Visits Requested:   1     FOLLOW UP: 2 weeks    Kerrie Schultz MD   PGY3- OBGYN Resident    [facility]    I spent 47 minutes caring for Cheryl on this date of service. This time includes time spent by me in the following activities: preparing for the visit, reviewing tests, performing a medically appropriate examination and/or evaluation, counseling and educating the patient/family/caregiver, ordering medications, tests, or procedures, referring and communicating with other health care professionals, documenting information in the medical record, and care coordination    Patient was seen and examined with Dr. Schultz,  resident, who performed portions of the examination and documentation for this patient's care under my direct supervision.  I agree with the above documentation and plan.    Latasha Farr MD  03/22/24  11:24 EDT

## 2024-03-21 NOTE — ADDENDUM NOTE
Encounter addended by: Laura Jeff on: 3/21/2024 3:53 PM   Actions taken: Child order released for a procedure order

## 2024-03-22 ENCOUNTER — OFFICE VISIT (OUTPATIENT)
Dept: GYNECOLOGIC ONCOLOGY | Facility: CLINIC | Age: 74
End: 2024-03-22
Payer: MEDICARE

## 2024-03-22 ENCOUNTER — HOSPITAL ENCOUNTER (OUTPATIENT)
Dept: ONCOLOGY | Facility: HOSPITAL | Age: 74
Discharge: HOME | End: 2024-03-22
Payer: MEDICARE

## 2024-03-22 VITALS
OXYGEN SATURATION: 96 % | TEMPERATURE: 97.8 F | HEART RATE: 70 BPM | RESPIRATION RATE: 17 BRPM | WEIGHT: 115.96 LBS | HEIGHT: 64 IN | SYSTOLIC BLOOD PRESSURE: 138 MMHG | BODY MASS INDEX: 19.8 KG/M2 | DIASTOLIC BLOOD PRESSURE: 66 MMHG

## 2024-03-22 DIAGNOSIS — C54.1 ENDOMETRIAL CANCER: Primary | ICD-10-CM

## 2024-03-22 DIAGNOSIS — E03.2 HYPOTHYROIDISM DUE TO MEDICATION: ICD-10-CM

## 2024-03-22 DIAGNOSIS — H18.893 CORNEAL IRRITATION OF BOTH EYES: Primary | ICD-10-CM

## 2024-03-22 DIAGNOSIS — E34.2: Primary | ICD-10-CM

## 2024-03-22 DIAGNOSIS — T45.1X5A: ICD-10-CM

## 2024-03-22 DIAGNOSIS — E87.6 HYPOKALEMIA: ICD-10-CM

## 2024-03-22 DIAGNOSIS — C54.1 ENDOMETRIAL CANCER: ICD-10-CM

## 2024-03-22 DIAGNOSIS — Z29.89 IMMUNOTHERAPY: ICD-10-CM

## 2024-03-22 DIAGNOSIS — H57.89: ICD-10-CM

## 2024-03-22 PROBLEM — E03.9 HYPOTHYROID: Status: ACTIVE | Noted: 2024-03-22

## 2024-03-22 LAB — POTASSIUM SERPL-SCNC: 3 MMOL/L (ref 3.5–5.2)

## 2024-03-22 PROCEDURE — 96375 TX/PRO/DX INJ NEW DRUG ADDON: CPT

## 2024-03-22 PROCEDURE — 25010000002 ONDANSETRON PER 1 MG: Performed by: OBSTETRICS & GYNECOLOGY

## 2024-03-22 PROCEDURE — 96374 THER/PROPH/DIAG INJ IV PUSH: CPT

## 2024-03-22 PROCEDURE — 96361 HYDRATE IV INFUSION ADD-ON: CPT

## 2024-03-22 PROCEDURE — 84132 ASSAY OF SERUM POTASSIUM: CPT | Performed by: OBSTETRICS & GYNECOLOGY

## 2024-03-22 PROCEDURE — 25810000003 SODIUM CHLORIDE 0.9 % SOLUTION: Performed by: OBSTETRICS & GYNECOLOGY

## 2024-03-22 PROCEDURE — 96360 HYDRATION IV INFUSION INIT: CPT

## 2024-03-22 PROCEDURE — 25010000002 HYDROCORTISONE SOD SUC (PF) 100 MG RECONSTITUTED SOLUTION: Performed by: OBSTETRICS & GYNECOLOGY

## 2024-03-22 RX ORDER — POLYETHYLENE GLYCOL 400 AND PROPYLENE GLYCOL 4; 3 MG/ML; MG/ML
1 SOLUTION/ DROPS OPHTHALMIC
Qty: 15 ML | Refills: 2 | Status: SHIPPED | OUTPATIENT
Start: 2024-03-22

## 2024-03-22 RX ORDER — PREDNISOLONE ACETATE 10 MG/ML
1 SUSPENSION/ DROPS OPHTHALMIC 4 TIMES DAILY
Qty: 10 ML | Refills: 1 | Status: SHIPPED | OUTPATIENT
Start: 2024-03-22

## 2024-03-22 RX ORDER — PREDNISONE 5 MG/1
TABLET ORAL
Qty: 90 TABLET | Refills: 0 | Status: SHIPPED | OUTPATIENT
Start: 2024-03-22 | End: 2024-04-06

## 2024-03-22 RX ORDER — LEVOTHYROXINE SODIUM 0.2 MG/1
200 TABLET ORAL DAILY
Qty: 30 TABLET | Refills: 0 | Status: SHIPPED | OUTPATIENT
Start: 2024-03-22 | End: 2024-04-21

## 2024-03-22 RX ADMIN — SODIUM CHLORIDE 1000 ML: 9 INJECTION, SOLUTION INTRAVENOUS at 10:01

## 2024-03-22 RX ADMIN — ONDANSETRON 16 MG: 2 INJECTION INTRAMUSCULAR; INTRAVENOUS at 10:16

## 2024-03-22 RX ADMIN — HYDROCORTISONE SODIUM SUCCINATE 100 MG: 100 INJECTION, POWDER, FOR SOLUTION INTRAMUSCULAR; INTRAVENOUS at 10:13

## 2024-03-25 ENCOUNTER — HOSPITAL ENCOUNTER (OUTPATIENT)
Dept: ONCOLOGY | Facility: HOSPITAL | Age: 74
Discharge: HOME | End: 2024-03-25
Admitting: OBSTETRICS & GYNECOLOGY
Payer: MEDICARE

## 2024-03-25 VITALS
BODY MASS INDEX: 20.86 KG/M2 | DIASTOLIC BLOOD PRESSURE: 56 MMHG | SYSTOLIC BLOOD PRESSURE: 116 MMHG | RESPIRATION RATE: 16 BRPM | TEMPERATURE: 96.6 F | HEIGHT: 64 IN | HEART RATE: 89 BPM | WEIGHT: 122.2 LBS

## 2024-03-25 DIAGNOSIS — C54.1 ENDOMETRIAL CANCER: Primary | ICD-10-CM

## 2024-03-25 DIAGNOSIS — E87.6 HYPOKALEMIA: ICD-10-CM

## 2024-03-25 LAB
ALBUMIN SERPL-MCNC: 3.7 G/DL (ref 3.5–5.2)
ALBUMIN/GLOB SERPL: 1.6 G/DL
ALP SERPL-CCNC: 108 U/L (ref 39–117)
ALT SERPL W P-5'-P-CCNC: 56 U/L (ref 1–33)
ANION GAP SERPL CALCULATED.3IONS-SCNC: 11 MMOL/L (ref 5–15)
AST SERPL-CCNC: 40 U/L (ref 1–32)
BASOPHILS # BLD AUTO: 0.01 10*3/MM3 (ref 0–0.2)
BASOPHILS NFR BLD AUTO: 0.2 % (ref 0–1.5)
BILIRUB SERPL-MCNC: 0.2 MG/DL (ref 0–1.2)
BUN SERPL-MCNC: 16 MG/DL (ref 8–23)
BUN/CREAT SERPL: 27.6 (ref 7–25)
CALCIUM SPEC-SCNC: 8.5 MG/DL (ref 8.6–10.5)
CHLORIDE SERPL-SCNC: 99 MMOL/L (ref 98–107)
CO2 SERPL-SCNC: 27 MMOL/L (ref 22–29)
CREAT SERPL-MCNC: 0.58 MG/DL (ref 0.57–1)
DEPRECATED RDW RBC AUTO: 58.6 FL (ref 37–54)
EGFRCR SERPLBLD CKD-EPI 2021: 95.7 ML/MIN/1.73
EOSINOPHIL # BLD AUTO: 0.04 10*3/MM3 (ref 0–0.4)
EOSINOPHIL NFR BLD AUTO: 0.9 % (ref 0.3–6.2)
ERYTHROCYTE [DISTWIDTH] IN BLOOD BY AUTOMATED COUNT: 16.4 % (ref 12.3–15.4)
GLOBULIN UR ELPH-MCNC: 2.3 GM/DL
GLUCOSE SERPL-MCNC: 121 MG/DL (ref 65–99)
HCT VFR BLD AUTO: 24.4 % (ref 34–46.6)
HGB BLD-MCNC: 8.5 G/DL (ref 12–15.9)
IMM GRANULOCYTES # BLD AUTO: 0.01 10*3/MM3 (ref 0–0.05)
IMM GRANULOCYTES NFR BLD AUTO: 0.2 % (ref 0–0.5)
LYMPHOCYTES # BLD AUTO: 0.73 10*3/MM3 (ref 0.7–3.1)
LYMPHOCYTES NFR BLD AUTO: 16.7 % (ref 19.6–45.3)
MCH RBC QN AUTO: 34.3 PG (ref 26.6–33)
MCHC RBC AUTO-ENTMCNC: 34.8 G/DL (ref 31.5–35.7)
MCV RBC AUTO: 98.4 FL (ref 79–97)
MONOCYTES # BLD AUTO: 0.35 10*3/MM3 (ref 0.1–0.9)
MONOCYTES NFR BLD AUTO: 8 % (ref 5–12)
NEUTROPHILS NFR BLD AUTO: 3.23 10*3/MM3 (ref 1.7–7)
NEUTROPHILS NFR BLD AUTO: 74 % (ref 42.7–76)
PLATELET # BLD AUTO: 216 10*3/MM3 (ref 140–450)
PMV BLD AUTO: 8.9 FL (ref 6–12)
POTASSIUM SERPL-SCNC: 3.6 MMOL/L (ref 3.5–5.2)
PROT SERPL-MCNC: 6 G/DL (ref 6–8.5)
RBC # BLD AUTO: 2.48 10*6/MM3 (ref 3.77–5.28)
SODIUM SERPL-SCNC: 137 MMOL/L (ref 136–145)
WBC NRBC COR # BLD AUTO: 4.37 10*3/MM3 (ref 3.4–10.8)

## 2024-03-25 PROCEDURE — 96374 THER/PROPH/DIAG INJ IV PUSH: CPT

## 2024-03-25 PROCEDURE — 25010000002 ONDANSETRON PER 1 MG: Performed by: OBSTETRICS & GYNECOLOGY

## 2024-03-25 PROCEDURE — 25810000003 SODIUM CHLORIDE 0.9 % SOLUTION: Performed by: OBSTETRICS & GYNECOLOGY

## 2024-03-25 PROCEDURE — 80053 COMPREHEN METABOLIC PANEL: CPT | Performed by: OBSTETRICS & GYNECOLOGY

## 2024-03-25 PROCEDURE — 85025 COMPLETE CBC W/AUTO DIFF WBC: CPT | Performed by: OBSTETRICS & GYNECOLOGY

## 2024-03-25 PROCEDURE — 96361 HYDRATE IV INFUSION ADD-ON: CPT

## 2024-03-25 RX ADMIN — SODIUM CHLORIDE 1000 ML: 9 INJECTION, SOLUTION INTRAVENOUS at 14:31

## 2024-03-25 RX ADMIN — ONDANSETRON 16 MG: 2 INJECTION INTRAMUSCULAR; INTRAVENOUS at 14:31

## 2024-03-26 ENCOUNTER — HOSPITAL ENCOUNTER (OUTPATIENT)
Dept: ONCOLOGY | Facility: HOSPITAL | Age: 74
Discharge: HOME | End: 2024-03-26
Admitting: OBSTETRICS & GYNECOLOGY
Payer: MEDICARE

## 2024-03-26 VITALS
SYSTOLIC BLOOD PRESSURE: 126 MMHG | RESPIRATION RATE: 16 BRPM | WEIGHT: 122 LBS | DIASTOLIC BLOOD PRESSURE: 61 MMHG | HEART RATE: 77 BPM | BODY MASS INDEX: 20.83 KG/M2 | TEMPERATURE: 97.7 F | HEIGHT: 64 IN

## 2024-03-26 DIAGNOSIS — C54.1 ENDOMETRIAL CANCER: Primary | ICD-10-CM

## 2024-03-26 PROCEDURE — 25810000003 SODIUM CHLORIDE 0.9 % SOLUTION: Performed by: OBSTETRICS & GYNECOLOGY

## 2024-03-26 PROCEDURE — 96368 THER/DIAG CONCURRENT INF: CPT

## 2024-03-26 PROCEDURE — 96361 HYDRATE IV INFUSION ADD-ON: CPT

## 2024-03-26 PROCEDURE — 96365 THER/PROPH/DIAG IV INF INIT: CPT

## 2024-03-26 PROCEDURE — 25010000002 ONDANSETRON PER 1 MG: Performed by: OBSTETRICS & GYNECOLOGY

## 2024-03-26 PROCEDURE — 96374 THER/PROPH/DIAG INJ IV PUSH: CPT

## 2024-03-26 PROCEDURE — 96360 HYDRATION IV INFUSION INIT: CPT

## 2024-03-26 RX ADMIN — SODIUM CHLORIDE 1000 ML: 9 INJECTION, SOLUTION INTRAVENOUS at 08:32

## 2024-03-26 RX ADMIN — ONDANSETRON 16 MG: 2 INJECTION INTRAMUSCULAR; INTRAVENOUS at 08:33

## 2024-03-27 ENCOUNTER — DOCUMENTATION (OUTPATIENT)
Dept: OTHER | Facility: HOSPITAL | Age: 74
End: 2024-03-27
Payer: MEDICARE

## 2024-03-27 ENCOUNTER — HOSPITAL ENCOUNTER (OUTPATIENT)
Dept: ONCOLOGY | Facility: HOSPITAL | Age: 74
Discharge: HOME | End: 2024-03-27
Payer: MEDICARE

## 2024-03-27 ENCOUNTER — DOCUMENTATION (OUTPATIENT)
Dept: GYNECOLOGIC ONCOLOGY | Facility: CLINIC | Age: 74
End: 2024-03-27
Payer: MEDICARE

## 2024-03-27 VITALS
DIASTOLIC BLOOD PRESSURE: 65 MMHG | HEIGHT: 64 IN | WEIGHT: 123.7 LBS | TEMPERATURE: 96.7 F | HEART RATE: 83 BPM | BODY MASS INDEX: 21.12 KG/M2 | SYSTOLIC BLOOD PRESSURE: 134 MMHG | RESPIRATION RATE: 16 BRPM

## 2024-03-27 DIAGNOSIS — C54.1 ENDOMETRIAL CANCER: Primary | ICD-10-CM

## 2024-03-27 PROCEDURE — 96374 THER/PROPH/DIAG INJ IV PUSH: CPT

## 2024-03-27 PROCEDURE — 96361 HYDRATE IV INFUSION ADD-ON: CPT

## 2024-03-27 PROCEDURE — 25810000003 SODIUM CHLORIDE 0.9 % SOLUTION: Performed by: OBSTETRICS & GYNECOLOGY

## 2024-03-27 PROCEDURE — 25010000002 ONDANSETRON PER 1 MG: Performed by: OBSTETRICS & GYNECOLOGY

## 2024-03-27 RX ORDER — ONDANSETRON 2 MG/ML
16 INJECTION INTRAMUSCULAR; INTRAVENOUS ONCE
Status: CANCELLED | OUTPATIENT
Start: 2024-03-28

## 2024-03-27 RX ORDER — ONDANSETRON 2 MG/ML
16 INJECTION INTRAMUSCULAR; INTRAVENOUS ONCE
Status: CANCELLED | OUTPATIENT
Start: 2024-03-29

## 2024-03-27 RX ADMIN — ONDANSETRON 16 MG: 2 INJECTION INTRAMUSCULAR; INTRAVENOUS at 11:15

## 2024-03-27 RX ADMIN — SODIUM CHLORIDE 1000 ML: 9 INJECTION, SOLUTION INTRAVENOUS at 10:16

## 2024-03-27 NOTE — PROGRESS NOTES
"I visited with Cheryl today at 9:45am while she was in OP infusion receiving supportive IVF. Pt reports a \"slight\" improvement in diarrhea in that her stools are less liquid than before and occurring ~6 episodes per day. She denies any abdominal pain or N/V. Appetite is good. She denies skin rash and reports that her eyes are much improved. Abd non-tender. Mucous membranes moist. She denies weakness, noting that her energy level is notably improved compared to last week. States that the nurse did have to stick her twice for IV access. States that she is hopeful to receive treatment next week and inquires if she could possibly have more IVF scheduled this week. Discussed that we will plan to get her on the schedule a couple more days this week for supportive treatment and plan to f/u with Dr Abreu in clinic on Monday 4/1 for re-check.   "

## 2024-03-27 NOTE — SIGNIFICANT NOTE
ANTONY met with pt in infusion per request. Pt explained that Sachin has been billing her late husbands insurance and not hers. She reported she has contacted billing several times over the matter, and asked how she could talk to a supervisor. SW provided pt with  Billing number and encouraged her to reach out a final time to see if issue can be resolved and if it can't, ask to escalate the call. Pt was agreeable and thanked ANTONY. ANTONY provided contact information and will be available ongoing.       03/27/24 1000   Oncology Interventions   Financial Needs insurance assistance  (provided  Billing information)

## 2024-03-28 ENCOUNTER — HOSPITAL ENCOUNTER (OUTPATIENT)
Dept: ONCOLOGY | Facility: HOSPITAL | Age: 74
Discharge: HOME | End: 2024-03-28
Payer: MEDICARE

## 2024-03-28 VITALS
HEART RATE: 74 BPM | SYSTOLIC BLOOD PRESSURE: 165 MMHG | TEMPERATURE: 98.5 F | HEIGHT: 64 IN | DIASTOLIC BLOOD PRESSURE: 76 MMHG | WEIGHT: 127 LBS | BODY MASS INDEX: 21.68 KG/M2 | RESPIRATION RATE: 16 BRPM

## 2024-03-28 DIAGNOSIS — C54.1 ENDOMETRIAL CANCER: Primary | ICD-10-CM

## 2024-03-28 PROCEDURE — 25010000002 ONDANSETRON PER 1 MG: Performed by: OBSTETRICS & GYNECOLOGY

## 2024-03-28 PROCEDURE — 96361 HYDRATE IV INFUSION ADD-ON: CPT

## 2024-03-28 PROCEDURE — 96374 THER/PROPH/DIAG INJ IV PUSH: CPT

## 2024-03-28 PROCEDURE — 96360 HYDRATION IV INFUSION INIT: CPT

## 2024-03-28 PROCEDURE — 25810000003 SODIUM CHLORIDE 0.9 % SOLUTION: Performed by: OBSTETRICS & GYNECOLOGY

## 2024-03-28 PROCEDURE — 96375 TX/PRO/DX INJ NEW DRUG ADDON: CPT

## 2024-03-28 RX ADMIN — ONDANSETRON 16 MG: 2 INJECTION INTRAMUSCULAR; INTRAVENOUS at 14:08

## 2024-03-28 RX ADMIN — SODIUM CHLORIDE 1000 ML: 9 INJECTION, SOLUTION INTRAVENOUS at 14:07

## 2024-03-29 ENCOUNTER — HOSPITAL ENCOUNTER (OUTPATIENT)
Dept: ONCOLOGY | Facility: HOSPITAL | Age: 74
Discharge: HOME | End: 2024-03-29
Payer: MEDICARE

## 2024-03-29 VITALS
BODY MASS INDEX: 21.34 KG/M2 | TEMPERATURE: 98.2 F | HEART RATE: 82 BPM | WEIGHT: 125 LBS | DIASTOLIC BLOOD PRESSURE: 63 MMHG | SYSTOLIC BLOOD PRESSURE: 135 MMHG | RESPIRATION RATE: 16 BRPM | HEIGHT: 64 IN

## 2024-03-29 DIAGNOSIS — C54.1 ENDOMETRIAL CANCER: Primary | ICD-10-CM

## 2024-03-29 PROCEDURE — 25810000003 SODIUM CHLORIDE 0.9 % SOLUTION: Performed by: OBSTETRICS & GYNECOLOGY

## 2024-03-29 PROCEDURE — 25010000002 ONDANSETRON PER 1 MG: Performed by: OBSTETRICS & GYNECOLOGY

## 2024-03-29 PROCEDURE — 96374 THER/PROPH/DIAG INJ IV PUSH: CPT

## 2024-03-29 PROCEDURE — 96360 HYDRATION IV INFUSION INIT: CPT

## 2024-03-29 PROCEDURE — 96361 HYDRATE IV INFUSION ADD-ON: CPT

## 2024-03-29 RX ADMIN — SODIUM CHLORIDE 1000 ML: 9 INJECTION, SOLUTION INTRAVENOUS at 11:37

## 2024-03-29 RX ADMIN — ONDANSETRON 16 MG: 2 INJECTION INTRAMUSCULAR; INTRAVENOUS at 12:32

## 2024-04-04 ENCOUNTER — HOSPITAL ENCOUNTER (OUTPATIENT)
Dept: ONCOLOGY | Facility: HOSPITAL | Age: 74
Discharge: HOME | End: 2024-04-04
Admitting: OBSTETRICS & GYNECOLOGY
Payer: MEDICARE

## 2024-04-04 VITALS
HEIGHT: 64 IN | TEMPERATURE: 98 F | HEART RATE: 72 BPM | DIASTOLIC BLOOD PRESSURE: 69 MMHG | SYSTOLIC BLOOD PRESSURE: 114 MMHG | WEIGHT: 115 LBS | RESPIRATION RATE: 16 BRPM | BODY MASS INDEX: 19.63 KG/M2

## 2024-04-04 DIAGNOSIS — C54.1 ENDOMETRIAL CANCER: Primary | ICD-10-CM

## 2024-04-04 LAB
ALBUMIN SERPL-MCNC: 3.9 G/DL (ref 3.5–5.2)
ALBUMIN/GLOB SERPL: 1.7 G/DL
ALP SERPL-CCNC: 129 U/L (ref 39–117)
ALT SERPL W P-5'-P-CCNC: 70 U/L (ref 1–33)
ANION GAP SERPL CALCULATED.3IONS-SCNC: 8 MMOL/L (ref 5–15)
AST SERPL-CCNC: 21 U/L (ref 1–32)
BASOPHILS # BLD AUTO: 0.02 10*3/MM3 (ref 0–0.2)
BASOPHILS NFR BLD AUTO: 0.4 % (ref 0–1.5)
BILIRUB SERPL-MCNC: 0.2 MG/DL (ref 0–1.2)
BUN SERPL-MCNC: 16 MG/DL (ref 8–23)
BUN/CREAT SERPL: 28.1 (ref 7–25)
CALCIUM SPEC-SCNC: 9 MG/DL (ref 8.6–10.5)
CANCER AG125 SERPL QL: 60 U/ML (ref 0–38.1)
CHLORIDE SERPL-SCNC: 99 MMOL/L (ref 98–107)
CO2 SERPL-SCNC: 26 MMOL/L (ref 22–29)
CREAT SERPL-MCNC: 0.57 MG/DL (ref 0.57–1)
DEPRECATED RDW RBC AUTO: 66.4 FL (ref 37–54)
EGFRCR SERPLBLD CKD-EPI 2021: 96.1 ML/MIN/1.73
EOSINOPHIL # BLD AUTO: 0.03 10*3/MM3 (ref 0–0.4)
EOSINOPHIL NFR BLD AUTO: 0.6 % (ref 0.3–6.2)
ERYTHROCYTE [DISTWIDTH] IN BLOOD BY AUTOMATED COUNT: 17.4 % (ref 12.3–15.4)
GLOBULIN UR ELPH-MCNC: 2.3 GM/DL
GLUCOSE SERPL-MCNC: 94 MG/DL (ref 65–99)
HCT VFR BLD AUTO: 29.3 % (ref 34–46.6)
HGB BLD-MCNC: 9.3 G/DL (ref 12–15.9)
IMM GRANULOCYTES # BLD AUTO: 0.01 10*3/MM3 (ref 0–0.05)
IMM GRANULOCYTES NFR BLD AUTO: 0.2 % (ref 0–0.5)
LYMPHOCYTES # BLD AUTO: 1.87 10*3/MM3 (ref 0.7–3.1)
LYMPHOCYTES NFR BLD AUTO: 37.6 % (ref 19.6–45.3)
MCH RBC QN AUTO: 33 PG (ref 26.6–33)
MCHC RBC AUTO-ENTMCNC: 31.7 G/DL (ref 31.5–35.7)
MCV RBC AUTO: 103.9 FL (ref 79–97)
MONOCYTES # BLD AUTO: 0.5 10*3/MM3 (ref 0.1–0.9)
MONOCYTES NFR BLD AUTO: 10.1 % (ref 5–12)
NEUTROPHILS NFR BLD AUTO: 2.54 10*3/MM3 (ref 1.7–7)
NEUTROPHILS NFR BLD AUTO: 51.1 % (ref 42.7–76)
PLATELET # BLD AUTO: 286 10*3/MM3 (ref 140–450)
PMV BLD AUTO: 9.2 FL (ref 6–12)
POTASSIUM SERPL-SCNC: 4 MMOL/L (ref 3.5–5.2)
PROT SERPL-MCNC: 6.2 G/DL (ref 6–8.5)
RBC # BLD AUTO: 2.82 10*6/MM3 (ref 3.77–5.28)
SODIUM SERPL-SCNC: 133 MMOL/L (ref 136–145)
T4 FREE SERPL-MCNC: 1.35 NG/DL (ref 0.92–1.68)
TSH SERPL DL<=0.05 MIU/L-ACNC: 7.68 UIU/ML (ref 0.27–4.2)
WBC NRBC COR # BLD AUTO: 4.97 10*3/MM3 (ref 3.4–10.8)

## 2024-04-04 PROCEDURE — 84439 ASSAY OF FREE THYROXINE: CPT | Performed by: OBSTETRICS & GYNECOLOGY

## 2024-04-04 PROCEDURE — 36415 COLL VENOUS BLD VENIPUNCTURE: CPT

## 2024-04-04 PROCEDURE — 86304 IMMUNOASSAY TUMOR CA 125: CPT | Performed by: OBSTETRICS & GYNECOLOGY

## 2024-04-04 PROCEDURE — 84443 ASSAY THYROID STIM HORMONE: CPT | Performed by: OBSTETRICS & GYNECOLOGY

## 2024-04-04 PROCEDURE — 80053 COMPREHEN METABOLIC PANEL: CPT | Performed by: OBSTETRICS & GYNECOLOGY

## 2024-04-04 PROCEDURE — 85025 COMPLETE CBC W/AUTO DIFF WBC: CPT | Performed by: OBSTETRICS & GYNECOLOGY

## 2024-04-04 NOTE — PROGRESS NOTES
Cheryl Storm  [id number]  1950      Reason for visit: Endometrial cancer, toxicity assessment, consideration of treatment    History of present illness:  The patient is a 74 y.o. year old female who presents today for treatment and evaluation of the above issues.    Patient presents today for consideration of cycle #6 of combined carboplatin/paclitaxel/dostarlimab-gxly. Treatment thus far has been complicated by elevated AST/ALT leading to hold of dostarlimab at cycle #2 and cycle #3 being delayed due to neutropenia. Hepatitis panel negative and hepatic function panel is being followed. Presents today for consideration of cycle 6.  Patient was last seen in the office on March 22.  At that visit, cycle #6 was held due to neutropenia, persistent diarrhea and eye symptoms.    Patient is doing better but symptoms persist.   IVF hydration last week helped.  Eyes are better but not resolved.  Continues to have light sensitivity, itchiness, weeping, irritation of the eyes.  Seen ophthy, has new appt next week.  Using steroid drops 4x day.  Diarrhea persists but improved, usually just one large episode daily and maybe 3-4 small episodes through the day. Takes occasional imodium.   Completed course of po steroids yesterday.  Says wants to proceed with treatment today.    Minimal neuropathy of fingertips.  No fevers/chills.  No cough.  No nausea/vomiting.  Appetite is good; says has had some weight loss but attributes to increased activity (10 pound weight loss over last 3 weeks).         Oncologic History:  Oncology/Hematology History   Endometrial cancer   8/8/2023 Initial Diagnosis    Patient to Dr. Felicia Botello with c/o postmenopausal bleeding. TVUS showed thickened endometrial stripe. EMB performed. Pathology showed serous endometrial cancer.  Referred to Gyn Oncology.   (Evaluation delayed due to social and insurance issues)     9/29/2023 Cancer Staged    Staging form: Corpus Uteri - Carcinoma And  Carcinosarcoma, AJCC 8th Edition  - Clinical stage from 9/29/2023: FIGO Stage I (cT1, cN0, cM0) - Signed by Latasha Farr MD on 9/29/2023     10/12/2023 Imaging    CT chest, abdomen, pelvis:  Irregular bilateral cystic adnexal findings are present, appearing somewhat solid and abnormally enhancing on the right measuring 4.3 cm and multilocular, with a stellate enhancing solid component on the left measuring 7.5 cm. Findings are suspicious for extension of or metastatic involvement from reported primary GYN neoplasm. Correlate with findings on reported upcoming salpingo-oophorectomy. Otherwise no suspicious findings for more distant metastatic involvement in the chest, abdomen and pelvis.     10/16/2023 Surgery    Robotic-assisted total laparoscopic hysterectomy, bilateral salpingo-oophorectomy, sentinel pelvic lymph node dissection, left ureterolysis, and omentectomy by Dr. Latasha Farr at [facility].    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 No myometrial invasion identified. Lymphvascular invasion present, low. Left ovary involved by serous carcinoma (100% serous at the ovary). Left pelvic sentinel node negative. Right pelvic positive for isolated tumor cells.   pT3a, pN0(i+), cM0    Molecular testing:  p53 amplified in both components  ER negative in dedifferentiated component, positive in serous (30%, 2+)  CO focally positive (5%, 2+)  Her2 negative in didifferentiated component, strongly positive (3+) in the serous compenent  PD-L1 <1  MSI intact     10/23/2023 Cancer Staged    Staging form: Corpus Uteri - Carcinoma And Carcinosarcoma, AJCC 8th Edition  - Pathologic stage from 10/23/2023: FIGO Stage IIIA (pT3a, pN0(i+), cM0) - Signed by Kathleen Fung APRN on 11/14/2023 11/17/2023 -  Chemotherapy    OP ENDOMETRIAL Dostarlimab-gxly / CARBOplatin AUC=5 / PACLitaxel  - dostarlimab held at cycle #2 due to  transaminitis  - cycle #3 delayed 1 week due to neutropenia     12/7/2023 Genetic Testing    Genetic testing NEGATIVE for pathogenic mutations in BRCA 1/2 and 34 other genes via [facility] panel.      2/22/2024 -  Chemotherapy    OP SUPPORTIVE HYDRATION + ANTIEMETICS           Past Medical History:   Diagnosis Date    Anxiety     Depression     Endometrial cancer 09/29/2023    Heartburn     Immunotherapy 12/28/2023       Past Surgical History:   Procedure Laterality Date    ORIF HUMERUS FRACTURE      20yrs ago    OTHER SURGICAL HISTORY Left     plate placed in great toe    SINUS SURGERY      2022    TOTAL LAPAROSCOPIC HYSTERECTOMY SALPINGO OOPHORECTOMY Bilateral 10/16/2023    Procedure: INJECTION OF ICG DYE, TOTAL LAPAROSCOPIC HYSTERECTOMY BILATERAL SALPINGOOPHORECTOMY, SENTINEL PELVIC LYMPH NODE DISSECTION, LEFT URETEROLYSIS, OMENTECTOMY WITH DAVINCI ROBOT;  Surgeon: Latasha Farr MD;  Location: [facility];  Service: Robotics - DaVinci;  Laterality: Bilateral;       MEDICATIONS:    Current Outpatient Medications:     clonazePAM (KlonoPIN) 0.5 MG tablet, Take 1 tablet by mouth 3 (Three) Times a Day As Needed for Anxiety., Disp: 90 tablet, Rfl: 0    diphenoxylate-atropine (Lomotil) 2.5-0.025 MG per tablet, Take 1 tablet by mouth 2 (Two) Times a Day As Needed for Diarrhea., Disp: 30 tablet, Rfl: 0    escitalopram (LEXAPRO) 20 MG tablet, , Disp: , Rfl:     latanoprost (XALATAN) 0.005 % ophthalmic solution, Administer 1 drop to both eyes Daily., Disp: , Rfl:     levothyroxine (Synthroid) 200 MCG tablet, Take 1 tablet by mouth Daily for 30 days., Disp: 30 tablet, Rfl: 0    ondansetron (ZOFRAN) 8 MG tablet, Take 1 tablet by mouth 3 (Three) Times a Day As Needed for Nausea or Vomiting., Disp: 30 tablet, Rfl: 5    polyethyl glycol-propyl glycol (Systane) 0.4-0.3 % solution ophthalmic solution (artificial tears), Administer 1 drop to both eyes Every 3 (Three) Hours As Needed (for dry or irritated eyes). Do not put in  "eyes within 10 minutes of prednisolone acetate., Disp: 15 mL, Rfl: 2    potassium chloride 10 MEQ CR tablet, Take 2 tablets by mouth 2 (Two) Times a Day., Disp: 10 tablet, Rfl: 0    prednisoLONE acetate (PRED FORTE) 1 % ophthalmic suspension, Administer 1 drop to both eyes 4 (Four) Times a Day., Disp: 10 mL, Rfl: 1    predniSONE (DELTASONE) 5 MG tablet, Take 5 tablets by mouth 2 (Two) Times a Day for 3 days, THEN 4 tablets 2 (Two) Times a Day for 3 days, THEN 3 tablets 2 (Two) Times a Day for 3 days, THEN 2 tablets 2 (Two) Times a Day for 3 days, THEN 1 tablet 2 (Two) Times a Day for 3 days., Disp: 90 tablet, Rfl: 0    simvastatin (ZOCOR) 10 MG tablet, , Disp: , Rfl:     valACYclovir (VALTREX) 500 MG tablet, , Disp: , Rfl:     zolpidem (Ambien) 10 MG tablet, Take 1 tablet by mouth At Night As Needed for Sleep., Disp: 30 tablet, Rfl: 0     Allergies:  has No Known Allergies.    Social History:   Social History     Socioeconomic History    Marital status: Legally    Tobacco Use    Smoking status: Former     Types: Cigarettes     Passive exposure: Past    Smokeless tobacco: Never    Tobacco comments:     Smoked in college. \"Bummed\" occasionally    Vaping Use    Vaping status: Never Used   Substance and Sexual Activity    Alcohol use: Not Currently     Comment: Stopped drinking alcohol about a year ago    Drug use: Never    Sexual activity: Not Currently     Partners: Male       Family History:    Family History   Problem Relation Age of Onset    Diabetes Father     Pancreatic cancer Father     Cancer Mother     No Known Problems Brother     Diabetes Brother     Pancreatic cancer Brother     No Known Problems Sister     No Known Problems Daughter     Breast cancer Neg Hx         no family hx       Health Maintenance:    Health Maintenance   Topic Date Due    DXA SCAN  Never done    COLORECTAL CANCER SCREENING  Never done    TDAP/TD VACCINES (1 - Tdap) Never done    RSV Vaccine - Adults (1 - 1-dose 60+ series) " Never done    Pneumococcal Vaccine 65+ (1 of 1 - PCV) Never done    MAMMOGRAM  08/31/2018    ZOSTER VACCINE (2 of 2) 02/20/2021    COVID-19 Vaccine (3 - 2023-24 season) 09/01/2023    ANNUAL WELLNESS VISIT  Never done    INFLUENZA VACCINE  08/01/2024    HEPATITIS C SCREENING  Completed       Review of Systems:  Please refer to history of present illness.  Review of systems otherwise negative.    Physical Exam:  There were no vitals filed for this visit.          There is no height or weight on file to calculate BMI.  Wt Readings from Last 3 Encounters:   04/04/24 52.2 kg (115 lb)   03/29/24 56.7 kg (125 lb)   03/28/24 57.6 kg (127 lb)     GENERAL: Alert, chronically ill-appearing female appearing her stated age who is in no apparent distress.   HEENT: Bilateral conjunctival injection, erythema around bilateral eyes, no drainage. Head normocephalic, atraumatic. Mucus membranes moist.   NECK: Trachea midline, supple, without masses.  No thyromegaly.   BREASTS: Deferred  CARDIOVASCULAR: Normal rate, regular rhythm, no murmurs, rubs, or gallops.  No peripheral edema.  RESPIRATORY: Clear to auscultation bilaterally, normal respiratory effort  BACK:  No CVA tenderness, no vertebral tenderness on palpation  GASTROINTESTINAL:  Abdomen is soft, non-tender, non-distended, no rebound or guarding, no masses, or hernias.   SKIN:  Warm, dry, well-perfused.  All visible areas intact.  No rashes, lesions, ulcers.  PSYCHIATRIC: AO x3, with appropriate affect, normal thought processes.  NEUROLOGIC: No focal deficits.  Moves extremities well.  MUSCULOSKELETAL: Normal gait and station.   EXTREMITIES:   No cyanosis, clubbing, symmetric.  LYMPHATICS:  No cervical or inguinal adenopathy noted.     PELVIC exam: Deferred    ECOG PS 0    PROCEDURES: None    Diagnostic Data:    CT Chest With Contrast Diagnostic    Result Date: 10/12/2023  Impression: Irregular bilateral cystic adnexal findings are present, appearing somewhat solid and  abnormally enhancing on the right measuring 4.3 cm and multilocular, with a stellate enhancing solid component on the left measuring 7.5 cm. Findings are suspicious for  extension of or metastatic involvement from reported primary GYN neoplasm. Correlate with findings on reported upcoming salpingo-oophorectomy. Otherwise no suspicious findings for more distant metastatic involvement in the chest, abdomen and pelvis. Electronically Signed: Yves Franks MD  10/12/2023 11:13 AM EDT  Workstation ID: [id number]    CT Abdomen Pelvis With Contrast    Result Date: 10/12/2023  Impression: Irregular bilateral cystic adnexal findings are present, appearing somewhat solid and abnormally enhancing on the right measuring 4.3 cm and multilocular, with a stellate enhancing solid component on the left measuring 7.5 cm. Findings are suspicious for  extension of or metastatic involvement from reported primary GYN neoplasm. Correlate with findings on reported upcoming salpingo-oophorectomy. Otherwise no suspicious findings for more distant metastatic involvement in the chest, abdomen and pelvis. Electronically Signed: Yves Franks MD  10/12/2023 11:13 AM EDT  Workstation ID: [id number]      Lab Results   Component Value Date    WBC 4.97 04/04/2024    HGB 9.3 (L) 04/04/2024    HCT 29.3 (L) 04/04/2024    .9 (H) 04/04/2024     04/04/2024    NEUTROABS 2.54 04/04/2024    GLUCOSE 94 04/04/2024    BUN 16 04/04/2024    CREATININE 0.57 04/04/2024     (L) 04/04/2024    K 4.0 04/04/2024    CL 99 04/04/2024    CO2 26.0 04/04/2024    CALCIUM 9.0 04/04/2024    ALBUMIN 3.9 04/04/2024    AST 21 04/04/2024    ALT 70 (H) 04/04/2024    BILITOT 0.2 04/04/2024     Lab Results   Component Value Date     41.5 (H) 03/21/2024     26.7 02/29/2024     25.2 02/15/2024     32.4 01/26/2024     31.7 12/28/2023     39.3 (H) 12/07/2023     47.7 (H) 11/14/2023     116.0 (H) 09/29/2023         Lab Results  "  Component Value Date    TSH 7.680 (H) 04/04/2024    TSH 26.030 (H) 03/21/2024    TSH 28.440 (H) 02/22/2024    FREET4 1.35 04/04/2024    FREET4 0.54 (L) 03/21/2024    FREET4 0.26 (L) 02/22/2024         Assessment & Plan   This is a 74 y.o. woman who presents for treatment of endometrial cancer presenting for consideration for treatment.    No diagnosis found.        Stage III endometrial cancer due to adnexal involvement  - Patient presents for cycle #6 of combined carboplatin/paclitaxel/dostarlimab for dedifferentiated/serous adenocarcinoma of the endometrium.  Will plan to proceed forward with cycle #6 of carboplatin and paclitaxel.  Will however hold Dostarlimab given continued diarrhea and eye symptoms.  - treatment complicated by transaminitis, diarrhea, and eye irritation   - dostarlimab hold with cycle #2 due to elevated AST and ALT >3x ULN but < 10x  - Cycle #3 delayed 1 week due to neutropenia  - Cycle 5 delayed due to neutropenia  - Cycle 6 delayed due to neutropenia/diarrhea/eye symptoms  -  increased from 42 to 60.  Plan for CT scan after this cycle of chemotherapy.    -Will try to continue with twice weekly IV fluid hydration and this appeared to help her symptoms.    Immunotherapy induced diarrhea   -baseline 1-2 BMs per day, for 3 months has had 8-10 episodes; patient says diarrhea symptoms are improved now only having 1 large episode with may be 3-5 smaller episodes.  -failed immodium, BRAT diet   -has tried 2 short course of steroids without improvement.  Just completed longer 2-week course of steroid taper which appears to have improved her symptoms.  -GI panel and C. Diff negative previously  -will plan for long course (2 weeks) of steroid taper to see if that improves   -Will continue with the twice weekly IV fluid hydration.    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 Discussed glaucoma drops unlikely to help  -has seen ophthalmologist, but would like second opinion, referral sent at last visit.  Has not an appointment to see new ophthalmologist next week.  -Continue eye steroid drops.    Chemotherapy-induced neutropenia  -Noted 12/28, cycle 3 and 5 and 6 delayed   -ANC now normal.  Plan to proceed forward with carboplatin paclitaxel chemotherapy today.    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  TSH 26, Free T4 0.5 on 3/21   Increased synthroid to 200 mcg daily at last visit.  TSH remains elevated but Free T4 in normal range today.     Transaminitis  -Grade 2, asymptomatic noted at cycle 2  -Per ASCO guidelines, dostarlimab held at cycle 2  -Hepatitis panel returned negative.  -Stable transaminitis    Hypokalemia   -K 2.6 on labs 3/21.  Improved today.      Anxiety/Depression  -Related to generalized worries and depressed mood, worsening after holidays and with getting treatment held. No SI.  -Referral to Tarah Hart made, patient cancelled as she feels she is doing somewhat better.  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 He does not think this is related to chemotherapy.  - Last visit 1/25/25, added new eye drop for pressures and antibiotic eye drop.   - glaucoma eye drops, has tried 3 different drops without improvement.  However, patient cannot see very well at this point and is likely getting some of the medications confused.  She requested referral to a new ophthalmologist and this was placed.  She was prescribed regimen typically given for immunotherapy patients to prevent ocular toxicity and effort to get better control of the  symptom.  In addition, oral steroids as noted above.      Pain assessment was performed today as a part of patient’s care.  For patients with pain related to surgery, gynecologic malignancy or cancer treatment, the plan is as noted in the assessment/plan.  For patients with pain not related to these issues, they are to seek any further needed care from a more appropriate provider, such as PCP.      No orders of the defined types were placed in this encounter.    FOLLOW UP: 3 weeks      Shannon Abreu MD  04/04/24  11:24 EDT

## 2024-04-05 ENCOUNTER — OFFICE VISIT (OUTPATIENT)
Dept: GYNECOLOGIC ONCOLOGY | Facility: CLINIC | Age: 74
End: 2024-04-05
Payer: MEDICARE

## 2024-04-05 ENCOUNTER — HOSPITAL ENCOUNTER (OUTPATIENT)
Dept: ONCOLOGY | Facility: HOSPITAL | Age: 74
Discharge: HOME | End: 2024-04-05
Payer: MEDICARE

## 2024-04-05 VITALS — HEART RATE: 68 BPM | SYSTOLIC BLOOD PRESSURE: 104 MMHG | DIASTOLIC BLOOD PRESSURE: 57 MMHG

## 2024-04-05 VITALS
WEIGHT: 113.5 LBS | RESPIRATION RATE: 18 BRPM | DIASTOLIC BLOOD PRESSURE: 65 MMHG | BODY MASS INDEX: 19.38 KG/M2 | TEMPERATURE: 97.2 F | SYSTOLIC BLOOD PRESSURE: 123 MMHG | HEART RATE: 76 BPM | OXYGEN SATURATION: 97 % | HEIGHT: 64 IN

## 2024-04-05 DIAGNOSIS — C54.1 ENDOMETRIAL CANCER: Primary | ICD-10-CM

## 2024-04-05 PROCEDURE — 96375 TX/PRO/DX INJ NEW DRUG ADDON: CPT

## 2024-04-05 PROCEDURE — 25810000003 SODIUM CHLORIDE 0.9 % SOLUTION 500 ML FLEX CONT: Performed by: OBSTETRICS & GYNECOLOGY

## 2024-04-05 PROCEDURE — 25010000002 CARBOPLATIN PER 50 MG: Performed by: OBSTETRICS & GYNECOLOGY

## 2024-04-05 PROCEDURE — 96417 CHEMO IV INFUS EACH ADDL SEQ: CPT

## 2024-04-05 PROCEDURE — 96415 CHEMO IV INFUSION ADDL HR: CPT

## 2024-04-05 PROCEDURE — 25010000002 DEXAMETHASONE SODIUM PHOSPHATE 100 MG/10ML SOLUTION: Performed by: OBSTETRICS & GYNECOLOGY

## 2024-04-05 PROCEDURE — 25810000003 SODIUM CHLORIDE 0.9 % SOLUTION: Performed by: OBSTETRICS & GYNECOLOGY

## 2024-04-05 PROCEDURE — 25810000003 SODIUM CHLORIDE 0.9 % SOLUTION 250 ML FLEX CONT: Performed by: OBSTETRICS & GYNECOLOGY

## 2024-04-05 PROCEDURE — 96367 TX/PROPH/DG ADDL SEQ IV INF: CPT

## 2024-04-05 PROCEDURE — 25010000002 PALONOSETRON PER 25 MCG: Performed by: OBSTETRICS & GYNECOLOGY

## 2024-04-05 PROCEDURE — 25010000002 FOSAPREPITANT PER 1 MG: Performed by: OBSTETRICS & GYNECOLOGY

## 2024-04-05 PROCEDURE — 25010000002 PACLITAXEL PER 1 MG: Performed by: OBSTETRICS & GYNECOLOGY

## 2024-04-05 PROCEDURE — 96413 CHEMO IV INFUSION 1 HR: CPT

## 2024-04-05 RX ORDER — FAMOTIDINE 10 MG/ML
20 INJECTION, SOLUTION INTRAVENOUS ONCE
Status: CANCELLED | OUTPATIENT
Start: 2024-04-05

## 2024-04-05 RX ORDER — FAMOTIDINE 10 MG/ML
20 INJECTION, SOLUTION INTRAVENOUS ONCE
Qty: 2 ML | Refills: 0 | Status: COMPLETED | OUTPATIENT
Start: 2024-04-05 | End: 2024-04-05

## 2024-04-05 RX ORDER — SODIUM CHLORIDE 9 MG/ML
20 INJECTION, SOLUTION INTRAVENOUS ONCE
Status: CANCELLED | OUTPATIENT
Start: 2024-04-05

## 2024-04-05 RX ORDER — FAMOTIDINE 10 MG/ML
20 INJECTION, SOLUTION INTRAVENOUS AS NEEDED
Status: CANCELLED | OUTPATIENT
Start: 2024-04-05

## 2024-04-05 RX ORDER — PALONOSETRON 0.05 MG/ML
0.25 INJECTION, SOLUTION INTRAVENOUS ONCE
Status: COMPLETED | OUTPATIENT
Start: 2024-04-05 | End: 2024-04-05

## 2024-04-05 RX ORDER — CETIRIZINE HYDROCHLORIDE 10 MG/1
10 TABLET ORAL DAILY
Status: CANCELLED
Start: 2024-04-05

## 2024-04-05 RX ORDER — DIPHENHYDRAMINE HYDROCHLORIDE 50 MG/ML
50 INJECTION INTRAMUSCULAR; INTRAVENOUS AS NEEDED
Status: CANCELLED | OUTPATIENT
Start: 2024-04-05

## 2024-04-05 RX ORDER — PALONOSETRON 0.05 MG/ML
0.25 INJECTION, SOLUTION INTRAVENOUS ONCE
Status: CANCELLED | OUTPATIENT
Start: 2024-04-05

## 2024-04-05 RX ORDER — CETIRIZINE HYDROCHLORIDE 10 MG/1
10 TABLET ORAL DAILY
Status: DISCONTINUED | OUTPATIENT
Start: 2024-04-05 | End: 2024-04-06 | Stop reason: HOSPADM

## 2024-04-05 RX ORDER — DORZOLAMIDE HYDROCHLORIDE AND TIMOLOL MALEATE PRESERVATIVE FREE 20; 5 MG/ML; MG/ML
SOLUTION/ DROPS OPHTHALMIC
COMMUNITY
Start: 2024-02-08

## 2024-04-05 RX ORDER — SODIUM CHLORIDE 9 MG/ML
20 INJECTION, SOLUTION INTRAVENOUS ONCE
Status: COMPLETED | OUTPATIENT
Start: 2024-04-05 | End: 2024-04-05

## 2024-04-05 RX ADMIN — CARBOPLATIN 420 MG: 10 INJECTION, SOLUTION INTRAVENOUS at 14:45

## 2024-04-05 RX ADMIN — PALONOSETRON HYDROCHLORIDE 0.25 MG: 0.25 INJECTION, SOLUTION INTRAVENOUS at 10:02

## 2024-04-05 RX ADMIN — SODIUM CHLORIDE 220 MG: 9 INJECTION, SOLUTION INTRAVENOUS at 11:20

## 2024-04-05 RX ADMIN — FOSAPREPITANT 150 MG: 150 INJECTION, POWDER, LYOPHILIZED, FOR SOLUTION INTRAVENOUS at 10:10

## 2024-04-05 RX ADMIN — CETIRIZINE HYDROCHLORIDE 10 MG: 10 TABLET, FILM COATED ORAL at 10:02

## 2024-04-05 RX ADMIN — DEXAMETHASONE SODIUM PHOSPHATE 20 MG: 10 INJECTION, SOLUTION INTRAMUSCULAR; INTRAVENOUS at 10:10

## 2024-04-05 RX ADMIN — FAMOTIDINE 20 MG: 10 INJECTION, SOLUTION INTRAVENOUS at 10:09

## 2024-04-05 RX ADMIN — SODIUM CHLORIDE 20 ML/HR: 9 INJECTION, SOLUTION INTRAVENOUS at 10:04

## 2024-04-09 ENCOUNTER — HOSPITAL ENCOUNTER (OUTPATIENT)
Dept: ONCOLOGY | Facility: HOSPITAL | Age: 74
Discharge: HOME | End: 2024-04-09
Admitting: OBSTETRICS & GYNECOLOGY
Payer: MEDICARE

## 2024-04-09 VITALS
HEIGHT: 64 IN | HEART RATE: 93 BPM | WEIGHT: 113.1 LBS | RESPIRATION RATE: 16 BRPM | SYSTOLIC BLOOD PRESSURE: 160 MMHG | DIASTOLIC BLOOD PRESSURE: 70 MMHG | TEMPERATURE: 98 F | BODY MASS INDEX: 19.31 KG/M2

## 2024-04-09 DIAGNOSIS — C54.1 ENDOMETRIAL CANCER: Primary | ICD-10-CM

## 2024-04-09 PROCEDURE — 96374 THER/PROPH/DIAG INJ IV PUSH: CPT

## 2024-04-09 PROCEDURE — 25810000003 SODIUM CHLORIDE 0.9 % SOLUTION: Performed by: OBSTETRICS & GYNECOLOGY

## 2024-04-09 PROCEDURE — 96361 HYDRATE IV INFUSION ADD-ON: CPT

## 2024-04-09 PROCEDURE — 25010000002 ONDANSETRON PER 1 MG: Performed by: OBSTETRICS & GYNECOLOGY

## 2024-04-09 RX ADMIN — ONDANSETRON 16 MG: 2 INJECTION INTRAMUSCULAR; INTRAVENOUS at 15:02

## 2024-04-09 RX ADMIN — SODIUM CHLORIDE 1000 ML: 9 INJECTION, SOLUTION INTRAVENOUS at 14:59

## 2024-04-12 ENCOUNTER — HOSPITAL ENCOUNTER (OUTPATIENT)
Dept: ONCOLOGY | Facility: HOSPITAL | Age: 74
Discharge: HOME | End: 2024-04-12
Payer: MEDICARE

## 2024-04-12 VITALS
BODY MASS INDEX: 19.72 KG/M2 | HEART RATE: 85 BPM | HEIGHT: 64 IN | RESPIRATION RATE: 16 BRPM | DIASTOLIC BLOOD PRESSURE: 68 MMHG | TEMPERATURE: 97.7 F | SYSTOLIC BLOOD PRESSURE: 130 MMHG | WEIGHT: 115.5 LBS

## 2024-04-12 DIAGNOSIS — C54.1 ENDOMETRIAL CANCER: Primary | ICD-10-CM

## 2024-04-12 PROCEDURE — 25810000003 SODIUM CHLORIDE 0.9 % SOLUTION: Performed by: OBSTETRICS & GYNECOLOGY

## 2024-04-12 PROCEDURE — 96374 THER/PROPH/DIAG INJ IV PUSH: CPT

## 2024-04-12 PROCEDURE — 96361 HYDRATE IV INFUSION ADD-ON: CPT

## 2024-04-12 PROCEDURE — 25010000002 ONDANSETRON PER 1 MG: Performed by: OBSTETRICS & GYNECOLOGY

## 2024-04-12 RX ORDER — LEVOTHYROXINE SODIUM 0.2 MG/1
200 TABLET ORAL DAILY
Qty: 30 TABLET | Refills: 0 | Status: SHIPPED | OUTPATIENT
Start: 2024-04-12

## 2024-04-12 RX ADMIN — ONDANSETRON 16 MG: 2 INJECTION INTRAMUSCULAR; INTRAVENOUS at 13:38

## 2024-04-12 RX ADMIN — SODIUM CHLORIDE 1000 ML: 9 INJECTION, SOLUTION INTRAVENOUS at 13:36

## 2024-04-16 ENCOUNTER — TELEPHONE (OUTPATIENT)
Dept: GYNECOLOGIC ONCOLOGY | Facility: CLINIC | Age: 74
End: 2024-04-16
Payer: MEDICARE

## 2024-04-16 ENCOUNTER — HOSPITAL ENCOUNTER (OUTPATIENT)
Dept: ONCOLOGY | Facility: HOSPITAL | Age: 74
Discharge: HOME | End: 2024-04-16
Admitting: OBSTETRICS & GYNECOLOGY
Payer: MEDICARE

## 2024-04-16 VITALS
DIASTOLIC BLOOD PRESSURE: 68 MMHG | RESPIRATION RATE: 16 BRPM | HEIGHT: 64 IN | TEMPERATURE: 98.4 F | BODY MASS INDEX: 19.97 KG/M2 | HEART RATE: 90 BPM | SYSTOLIC BLOOD PRESSURE: 113 MMHG | WEIGHT: 117 LBS

## 2024-04-16 DIAGNOSIS — C54.1 ENDOMETRIAL CANCER: Primary | ICD-10-CM

## 2024-04-16 PROCEDURE — 96360 HYDRATION IV INFUSION INIT: CPT

## 2024-04-16 PROCEDURE — 25810000003 SODIUM CHLORIDE 0.9 % SOLUTION: Performed by: OBSTETRICS & GYNECOLOGY

## 2024-04-16 RX ORDER — ONDANSETRON 2 MG/ML
16 INJECTION INTRAMUSCULAR; INTRAVENOUS ONCE
Status: CANCELLED | OUTPATIENT
Start: 2024-04-16

## 2024-04-16 RX ORDER — ONDANSETRON 2 MG/ML
16 INJECTION INTRAMUSCULAR; INTRAVENOUS ONCE
Status: CANCELLED | OUTPATIENT
Start: 2024-04-18

## 2024-04-16 RX ADMIN — SODIUM CHLORIDE 1000 ML: 9 INJECTION, SOLUTION INTRAVENOUS at 14:41

## 2024-04-16 NOTE — TELEPHONE ENCOUNTER
RN from infusion called and told RN that this patient is out of Synthroid. RN asked the provider to refill the medication for patient.  ----- Message from Neida Fowler RN sent at 4/16/2024  2:47 PM EDT -----  Regarding: Call me  Not an emergency just call me when you have a second. The patient has a question. Thanks!    Neida [id number]

## 2024-04-18 ENCOUNTER — HOSPITAL ENCOUNTER (OUTPATIENT)
Dept: ONCOLOGY | Facility: HOSPITAL | Age: 74
Discharge: HOME | End: 2024-04-18
Admitting: OBSTETRICS & GYNECOLOGY
Payer: MEDICARE

## 2024-04-18 VITALS
RESPIRATION RATE: 16 BRPM | SYSTOLIC BLOOD PRESSURE: 113 MMHG | WEIGHT: 118.2 LBS | DIASTOLIC BLOOD PRESSURE: 73 MMHG | HEART RATE: 92 BPM | TEMPERATURE: 98.7 F | HEIGHT: 64 IN | BODY MASS INDEX: 20.18 KG/M2

## 2024-04-18 DIAGNOSIS — C54.1 ENDOMETRIAL CANCER: Primary | ICD-10-CM

## 2024-04-18 PROCEDURE — 96360 HYDRATION IV INFUSION INIT: CPT

## 2024-04-18 PROCEDURE — 25810000003 SODIUM CHLORIDE 0.9 % SOLUTION: Performed by: OBSTETRICS & GYNECOLOGY

## 2024-04-18 RX ORDER — LEVOTHYROXINE SODIUM 0.2 MG/1
200 TABLET ORAL DAILY
Qty: 30 TABLET | Refills: 0 | Status: SHIPPED | OUTPATIENT
Start: 2024-04-18

## 2024-04-18 RX ADMIN — SODIUM CHLORIDE 1000 ML: 9 INJECTION, SOLUTION INTRAVENOUS at 14:57

## 2024-04-19 DIAGNOSIS — C54.1 ENDOMETRIAL CANCER: Primary | ICD-10-CM

## 2024-04-22 ENCOUNTER — HOSPITAL ENCOUNTER (OUTPATIENT)
Facility: HOSPITAL | Age: 74
End: 2024-04-22
Payer: MEDICARE

## 2024-04-22 ENCOUNTER — HOSPITAL ENCOUNTER (OUTPATIENT)
Dept: ONCOLOGY | Facility: HOSPITAL | Age: 74
Discharge: HOME | End: 2024-04-22
Payer: MEDICARE

## 2024-04-22 ENCOUNTER — HOSPITAL ENCOUNTER (OUTPATIENT)
Facility: HOSPITAL | Age: 74
Discharge: HOME | End: 2024-04-22
Payer: MEDICARE

## 2024-04-22 VITALS
SYSTOLIC BLOOD PRESSURE: 104 MMHG | HEIGHT: 64 IN | RESPIRATION RATE: 16 BRPM | TEMPERATURE: 98 F | HEART RATE: 85 BPM | WEIGHT: 116 LBS | BODY MASS INDEX: 19.81 KG/M2 | DIASTOLIC BLOOD PRESSURE: 76 MMHG

## 2024-04-22 DIAGNOSIS — C54.1 ENDOMETRIAL CANCER: ICD-10-CM

## 2024-04-22 DIAGNOSIS — C54.1 ENDOMETRIAL CANCER: Primary | ICD-10-CM

## 2024-04-22 LAB
ALBUMIN SERPL-MCNC: 3.5 G/DL (ref 3.5–5.2)
ALBUMIN/GLOB SERPL: 1.7 G/DL
ALP SERPL-CCNC: 76 U/L (ref 39–117)
ALT SERPL W P-5'-P-CCNC: 27 U/L (ref 1–33)
ANION GAP SERPL CALCULATED.3IONS-SCNC: 11 MMOL/L (ref 5–15)
AST SERPL-CCNC: 27 U/L (ref 1–32)
BASOPHILS # BLD AUTO: 0.02 10*3/MM3 (ref 0–0.2)
BASOPHILS NFR BLD AUTO: 0.7 % (ref 0–1.5)
BILIRUB SERPL-MCNC: <0.2 MG/DL (ref 0–1.2)
BUN SERPL-MCNC: 12 MG/DL (ref 8–23)
BUN/CREAT SERPL: 25.5 (ref 7–25)
CALCIUM SPEC-SCNC: 8.2 MG/DL (ref 8.6–10.5)
CHLORIDE SERPL-SCNC: 101 MMOL/L (ref 98–107)
CO2 SERPL-SCNC: 25 MMOL/L (ref 22–29)
CREAT SERPL-MCNC: 0.47 MG/DL (ref 0.57–1)
DEPRECATED RDW RBC AUTO: 62.1 FL (ref 37–54)
EGFRCR SERPLBLD CKD-EPI 2021: 100.7 ML/MIN/1.73
EOSINOPHIL # BLD AUTO: 0.02 10*3/MM3 (ref 0–0.4)
EOSINOPHIL NFR BLD AUTO: 0.7 % (ref 0.3–6.2)
ERYTHROCYTE [DISTWIDTH] IN BLOOD BY AUTOMATED COUNT: 17.2 % (ref 12.3–15.4)
GLOBULIN UR ELPH-MCNC: 2.1 GM/DL
GLUCOSE SERPL-MCNC: 101 MG/DL (ref 65–99)
HCT VFR BLD AUTO: 24.1 % (ref 34–46.6)
HGB BLD-MCNC: 8.1 G/DL (ref 12–15.9)
IMM GRANULOCYTES # BLD AUTO: 0 10*3/MM3 (ref 0–0.05)
IMM GRANULOCYTES NFR BLD AUTO: 0 % (ref 0–0.5)
LYMPHOCYTES # BLD AUTO: 1.33 10*3/MM3 (ref 0.7–3.1)
LYMPHOCYTES NFR BLD AUTO: 49.3 % (ref 19.6–45.3)
MCH RBC QN AUTO: 33.3 PG (ref 26.6–33)
MCHC RBC AUTO-ENTMCNC: 33.6 G/DL (ref 31.5–35.7)
MCV RBC AUTO: 99.2 FL (ref 79–97)
MONOCYTES # BLD AUTO: 0.56 10*3/MM3 (ref 0.1–0.9)
MONOCYTES NFR BLD AUTO: 20.7 % (ref 5–12)
NEUTROPHILS NFR BLD AUTO: 0.77 10*3/MM3 (ref 1.7–7)
NEUTROPHILS NFR BLD AUTO: 28.6 % (ref 42.7–76)
NRBC BLD AUTO-RTO: 0 /100 WBC (ref 0–0.2)
PLATELET # BLD AUTO: 225 10*3/MM3 (ref 140–450)
PMV BLD AUTO: 9.6 FL (ref 6–12)
POTASSIUM SERPL-SCNC: 3.3 MMOL/L (ref 3.5–5.2)
PROT SERPL-MCNC: 5.6 G/DL (ref 6–8.5)
RBC # BLD AUTO: 2.43 10*6/MM3 (ref 3.77–5.28)
SODIUM SERPL-SCNC: 137 MMOL/L (ref 136–145)
T4 FREE SERPL-MCNC: 0.4 NG/DL (ref 0.92–1.68)
TSH SERPL DL<=0.05 MIU/L-ACNC: 19.29 UIU/ML (ref 0.27–4.2)
WBC NRBC COR # BLD AUTO: 2.7 10*3/MM3 (ref 3.4–10.8)

## 2024-04-22 PROCEDURE — 36416 COLLJ CAPILLARY BLOOD SPEC: CPT

## 2024-04-22 PROCEDURE — 25010000002 ONDANSETRON PER 1 MG: Performed by: OBSTETRICS & GYNECOLOGY

## 2024-04-22 PROCEDURE — 85025 COMPLETE CBC W/AUTO DIFF WBC: CPT | Performed by: OBSTETRICS & GYNECOLOGY

## 2024-04-22 PROCEDURE — 74177 CT ABD & PELVIS W/CONTRAST: CPT

## 2024-04-22 PROCEDURE — 25510000001 IOPAMIDOL 61 % SOLUTION: Performed by: OBSTETRICS & GYNECOLOGY

## 2024-04-22 PROCEDURE — 25810000003 SODIUM CHLORIDE 0.9 % SOLUTION: Performed by: OBSTETRICS & GYNECOLOGY

## 2024-04-22 PROCEDURE — 96360 HYDRATION IV INFUSION INIT: CPT

## 2024-04-22 PROCEDURE — 86304 IMMUNOASSAY TUMOR CA 125: CPT | Performed by: OBSTETRICS & GYNECOLOGY

## 2024-04-22 PROCEDURE — 84439 ASSAY OF FREE THYROXINE: CPT | Performed by: OBSTETRICS & GYNECOLOGY

## 2024-04-22 PROCEDURE — 80053 COMPREHEN METABOLIC PANEL: CPT | Performed by: OBSTETRICS & GYNECOLOGY

## 2024-04-22 PROCEDURE — 71260 CT THORAX DX C+: CPT

## 2024-04-22 PROCEDURE — 84443 ASSAY THYROID STIM HORMONE: CPT | Performed by: OBSTETRICS & GYNECOLOGY

## 2024-04-22 PROCEDURE — 96374 THER/PROPH/DIAG INJ IV PUSH: CPT

## 2024-04-22 RX ADMIN — IOPAMIDOL 90 ML: 612 INJECTION, SOLUTION INTRAVENOUS at 10:03

## 2024-04-22 RX ADMIN — ONDANSETRON 16 MG: 2 INJECTION INTRAMUSCULAR; INTRAVENOUS at 14:42

## 2024-04-22 RX ADMIN — SODIUM CHLORIDE 1000 ML: 9 INJECTION, SOLUTION INTRAVENOUS at 14:39

## 2024-04-23 DIAGNOSIS — C54.1 ENDOMETRIAL CANCER: Primary | ICD-10-CM

## 2024-04-23 LAB — CANCER AG125 SERPL QL: 32.5 U/ML (ref 0–38.1)

## 2024-04-23 RX ORDER — ONDANSETRON 2 MG/ML
16 INJECTION INTRAMUSCULAR; INTRAVENOUS ONCE
OUTPATIENT
Start: 2024-04-26

## 2024-04-23 RX ORDER — LEVOTHYROXINE SODIUM 0.2 MG/1
200 TABLET ORAL DAILY
Qty: 30 TABLET | Refills: 0 | Status: SHIPPED | OUTPATIENT
Start: 2024-04-23

## 2024-04-26 ENCOUNTER — OFFICE VISIT (OUTPATIENT)
Dept: GYNECOLOGIC ONCOLOGY | Facility: CLINIC | Age: 74
End: 2024-04-26
Payer: MEDICARE

## 2024-04-26 VITALS
TEMPERATURE: 97.5 F | DIASTOLIC BLOOD PRESSURE: 59 MMHG | RESPIRATION RATE: 17 BRPM | HEIGHT: 64 IN | BODY MASS INDEX: 20.04 KG/M2 | HEART RATE: 72 BPM | WEIGHT: 117.4 LBS | SYSTOLIC BLOOD PRESSURE: 116 MMHG | OXYGEN SATURATION: 98 %

## 2024-04-26 DIAGNOSIS — C54.1 ENDOMETRIAL CANCER: Primary | ICD-10-CM

## 2024-04-26 DIAGNOSIS — H57.89: ICD-10-CM

## 2024-04-26 DIAGNOSIS — E03.2 HYPOTHYROIDISM DUE TO MEDICATION: ICD-10-CM

## 2024-04-26 DIAGNOSIS — Z29.89 IMMUNOTHERAPY: ICD-10-CM

## 2024-04-26 DIAGNOSIS — T45.1X5A: ICD-10-CM

## 2024-04-26 NOTE — PROGRESS NOTES
Cheryl Storm  [id number]  1950      Reason for visit: Endometrial cancer, toxicity assessment, consideration of treatment    History of present illness:  The patient is a 74 y.o. year old female who presents today for treatment and evaluation of the above issues.    Patient presents today for consideration of cycle #7 of combined carboplatin/paclitaxel/dostarlimab-gxly. Treatment thus far has been complicated by elevated AST/ALT leading to hold of dostarlimab at cycle #2 and cycle #3 being delayed due to neutropenia. Hepatitis panel negative and hepatic function panel is being followed. She also has persistent diarrhea. Did not improve with steroids, Imodium and she required several rounds of IV hydration. She says it has improved since last cycle but still having 2-3 loose BM per day. She continues to have blurry vision. Seeing new ophthalmologist. Her energy is good. No abdominal pain. No chest pain or SOA. Has been spending time in her garden.     Oncologic History:  Oncology/Hematology History   Endometrial cancer   8/8/2023 Initial Diagnosis    Patient to Dr. Felicia Botello with c/o postmenopausal bleeding. TVUS showed thickened endometrial stripe. EMB performed. Pathology showed serous endometrial cancer.  Referred to Gyn Oncology.   (Evaluation delayed due to social and insurance issues)     9/29/2023 Cancer Staged    Staging form: Corpus Uteri - Carcinoma And Carcinosarcoma, AJCC 8th Edition  - Clinical stage from 9/29/2023: FIGO Stage I (cT1, cN0, cM0) - Signed by Latasha Farr MD on 9/29/2023     10/12/2023 Imaging    CT chest, abdomen, pelvis:  Irregular bilateral cystic adnexal findings are present, appearing somewhat solid and abnormally enhancing on the right measuring 4.3 cm and multilocular, with a stellate enhancing solid component on the left measuring 7.5 cm. Findings are suspicious for extension of or metastatic involvement from reported primary GYN neoplasm. Correlate with findings on  reported upcoming salpingo-oophorectomy. Otherwise no suspicious findings for more distant metastatic involvement in the chest, abdomen and pelvis.     10/16/2023 Surgery    Robotic-assisted total laparoscopic hysterectomy, bilateral salpingo-oophorectomy, sentinel pelvic lymph node dissection, left ureterolysis, and omentectomy by Dr. Latasha Farr at [facility].    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 No myometrial invasion identified. Lymphvascular invasion present, low. Left ovary involved by serous carcinoma (100% serous at the ovary). Left pelvic sentinel node negative. Right pelvic positive for isolated tumor cells.   pT3a, pN0(i+), cM0    Molecular testing:  p53 amplified in both components  ER negative in dedifferentiated component, positive in serous (30%, 2+)  AZ focally positive (5%, 2+)  Her2 negative in didifferentiated component, strongly positive (3+) in the serous compenent  PD-L1 <1  MSI intact     10/23/2023 Cancer Staged    Staging form: Corpus Uteri - Carcinoma And Carcinosarcoma, AJCC 8th Edition  - Pathologic stage from 10/23/2023: FIGO Stage IIIA (pT3a, pN0(i+), cM0) - Signed by Kathleen Fung APRN on 11/14/2023 11/17/2023 - 4/5/2024 Chemotherapy    OP ENDOMETRIAL Dostarlimab-gxly / CARBOplatin AUC=5 / PACLitaxel  - dostarlimab held at cycle #2 due to transaminitis  - cycle #3 delayed 1 week due to neutropenia  Dostarlimab not continued as a maintenance drug due to GI and ocular toxicity.     12/7/2023 Genetic Testing    Genetic testing NEGATIVE for pathogenic mutations in BRCA 1/2 and 34 other genes via [facility] panel.      2/22/2024 -  Chemotherapy    OP SUPPORTIVE HYDRATION + ANTIEMETICS           Past Medical History:   Diagnosis Date    Anxiety     Depression     Endometrial cancer 09/29/2023    Heartburn     Immunotherapy 12/28/2023       Past Surgical History:   Procedure  Laterality Date    ORIF HUMERUS FRACTURE      20yrs ago    OTHER SURGICAL HISTORY Left     plate placed in great toe    SINUS SURGERY      2022    TOTAL LAPAROSCOPIC HYSTERECTOMY SALPINGO OOPHORECTOMY Bilateral 10/16/2023    Procedure: INJECTION OF ICG DYE, TOTAL LAPAROSCOPIC HYSTERECTOMY BILATERAL SALPINGOOPHORECTOMY, SENTINEL PELVIC LYMPH NODE DISSECTION, LEFT URETEROLYSIS, OMENTECTOMY WITH DAVINCI ROBOT;  Surgeon: Latasha Farr MD;  Location: [facility];  Service: Robotics - DaVinci;  Laterality: Bilateral;       MEDICATIONS:    Current Outpatient Medications:     diphenoxylate-atropine (Lomotil) 2.5-0.025 MG per tablet, Take 1 tablet by mouth 2 (Two) Times a Day As Needed for Diarrhea., Disp: 30 tablet, Rfl: 0    Dorzolamide HCl-Timolol Mal PF 2-0.5 % solution, , Disp: , Rfl:     escitalopram (LEXAPRO) 20 MG tablet, , Disp: , Rfl:     latanoprost (XALATAN) 0.005 % ophthalmic solution, Administer 1 drop to both eyes Daily., Disp: , Rfl:     levothyroxine (Synthroid) 200 MCG tablet, Take 1 tablet by mouth Daily., Disp: 30 tablet, Rfl: 0    polyethyl glycol-propyl glycol (Systane) 0.4-0.3 % solution ophthalmic solution (artificial tears), Administer 1 drop to both eyes Every 3 (Three) Hours As Needed (for dry or irritated eyes). Do not put in eyes within 10 minutes of prednisolone acetate., Disp: 15 mL, Rfl: 2    potassium chloride 10 MEQ CR tablet, Take 2 tablets by mouth 2 (Two) Times a Day., Disp: 10 tablet, Rfl: 0    simvastatin (ZOCOR) 10 MG tablet, , Disp: , Rfl:     valACYclovir (VALTREX) 500 MG tablet, , Disp: , Rfl:     zolpidem (Ambien) 10 MG tablet, Take 1 tablet by mouth At Night As Needed for Sleep., Disp: 30 tablet, Rfl: 0     Allergies:  has No Known Allergies.    Social History:   Social History     Socioeconomic History    Marital status: Legally    Tobacco Use    Smoking status: Former     Types: Cigarettes     Passive exposure: Past    Smokeless tobacco: Never    Tobacco comments:     " Smoked in college. \"Bummed\" occasionally    Vaping Use    Vaping status: Never Used   Substance and Sexual Activity    Alcohol use: Not Currently     Comment: Stopped drinking alcohol about a year ago    Drug use: Never    Sexual activity: Not Currently     Partners: Male       Family History:    Family History   Problem Relation Age of Onset    Diabetes Father     Pancreatic cancer Father     Cancer Mother     No Known Problems Brother     Diabetes Brother     Pancreatic cancer Brother     No Known Problems Sister     No Known Problems Daughter     Breast cancer Neg Hx         no family hx       Health Maintenance:    Health Maintenance   Topic Date Due    DXA SCAN  Never done    COLORECTAL CANCER SCREENING  Never done    TDAP/TD VACCINES (1 - Tdap) Never done    RSV Vaccine - Adults (1 - 1-dose 60+ series) Never done    Pneumococcal Vaccine 65+ (1 of 1 - PCV) Never done    MAMMOGRAM  08/31/2018    ZOSTER VACCINE (2 of 2) 02/20/2021    COVID-19 Vaccine (3 - 2023-24 season) 09/01/2023    ANNUAL WELLNESS VISIT  Never done    INFLUENZA VACCINE  08/01/2024    HEPATITIS C SCREENING  Completed       Review of Systems:  Please refer to history of present illness.  Review of systems otherwise negative.    Vitals:    04/26/24 0931   BP: 116/59   Pulse: 72   Resp: 17   Temp: 97.5 °F (36.4 °C)   TempSrc: Temporal   SpO2: 98%   Weight: 53.3 kg (117 lb 6.4 oz)   Height: 162.6 cm (64.02\")   PainSc: 0-No pain       Body mass index is 20.14 kg/m².  Wt Readings from Last 3 Encounters:   04/26/24 53.3 kg (117 lb 6.4 oz)   04/22/24 52.6 kg (116 lb)   04/18/24 53.6 kg (118 lb 3.2 oz)     Review of Systems:  GENERAL: Alert, chronically ill-appearing female appearing her stated age who is in no apparent distress.   HEENT: Bilateral conjunctival injection, erythema around bilateral eyes, no drainage. Head normocephalic, atraumatic. Mucus membranes moist.   NECK: Trachea midline, supple, without masses.  No thyromegaly.   BREASTS: " Deferred  CARDIOVASCULAR: Normal rate, regular rhythm, no murmurs, rubs, or gallops.  No peripheral edema.  RESPIRATORY: Clear to auscultation bilaterally, normal respiratory effort  BACK:  No CVA tenderness, no vertebral tenderness on palpation  GASTROINTESTINAL:  Abdomen is soft, non-tender, non-distended, no rebound or guarding, no masses, or hernias.   SKIN:  Warm, dry, well-perfused.  All visible areas intact.  No rashes, lesions, ulcers.  PSYCHIATRIC: AO x3, with appropriate affect, normal thought processes.  NEUROLOGIC: No focal deficits.  Moves extremities well.  MUSCULOSKELETAL: Normal gait and station.   EXTREMITIES:   No cyanosis, clubbing, symmetric.  LYMPHATICS:  No cervical or inguinal adenopathy noted.     PELVIC exam: Deferred    ECOG PS 0    PROCEDURES: None    Diagnostic Data:    CT Chest With Contrast Diagnostic    Result Date: 10/12/2023  Impression: Irregular bilateral cystic adnexal findings are present, appearing somewhat solid and abnormally enhancing on the right measuring 4.3 cm and multilocular, with a stellate enhancing solid component on the left measuring 7.5 cm. Findings are suspicious for  extension of or metastatic involvement from reported primary GYN neoplasm. Correlate with findings on reported upcoming salpingo-oophorectomy. Otherwise no suspicious findings for more distant metastatic involvement in the chest, abdomen and pelvis. Electronically Signed: Yves Franks MD  10/12/2023 11:13 AM EDT  Workstation ID: [id number]    CT Abdomen Pelvis With Contrast    Result Date: 10/12/2023  Impression: Irregular bilateral cystic adnexal findings are present, appearing somewhat solid and abnormally enhancing on the right measuring 4.3 cm and multilocular, with a stellate enhancing solid component on the left measuring 7.5 cm. Findings are suspicious for  extension of or metastatic involvement from reported primary GYN neoplasm. Correlate with findings on reported upcoming  salpingo-oophorectomy. Otherwise no suspicious findings for more distant metastatic involvement in the chest, abdomen and pelvis. Electronically Signed: Yves Franks MD  10/12/2023 11:13 AM EDT  Workstation ID: [id number]      Lab Results   Component Value Date    WBC 2.70 (L) 04/22/2024    HGB 8.1 (L) 04/22/2024    HCT 24.1 (L) 04/22/2024    MCV 99.2 (H) 04/22/2024     04/22/2024    NEUTROABS 0.77 (L) 04/22/2024    GLUCOSE 101 (H) 04/22/2024    BUN 12 04/22/2024    CREATININE 0.47 (L) 04/22/2024     04/22/2024    K 3.3 (L) 04/22/2024     04/22/2024    CO2 25.0 04/22/2024    CALCIUM 8.2 (L) 04/22/2024    ALBUMIN 3.5 04/22/2024    AST 27 04/22/2024    ALT 27 04/22/2024    BILITOT <0.2 04/22/2024     Lab Results   Component Value Date     32.5 04/22/2024     60.0 (H) 04/04/2024     41.5 (H) 03/21/2024     26.7 02/29/2024     25.2 02/15/2024     32.4 01/26/2024     31.7 12/28/2023     39.3 (H) 12/07/2023     47.7 (H) 11/14/2023     116.0 (H) 09/29/2023         Lab Results   Component Value Date    TSH 19.290 (H) 04/22/2024    TSH 7.680 (H) 04/04/2024    TSH 26.030 (H) 03/21/2024    FREET4 0.40 (L) 04/22/2024    FREET4 1.35 04/04/2024    FREET4 0.54 (L) 03/21/2024         Assessment & Plan   This is a 74 y.o. woman who presents for treatment of endometrial cancer presenting for consideration for treatment.    Encounter Diagnoses   Name Primary?    Endometrial cancer Yes    Immunotherapy     Hypothyroidism due to medication     Immunosuppressive-induced eye inflammation        Stage III endometrial cancer due to adnexal involvement  - Patient presents for cycle #7 of combined carboplatin/paclitaxel/dostarlimab for dedifferentiated/serous adenocarcinoma of the endometrium  - treatment complicated by transaminitis, diarrhea, and now eye irritation   - dostarlimab hold with cycle #2 due to elevated AST and ALT >3x ULN but < 10x  - Cycle #3 delayed 1 week  "due to neutropenia  - Cycle 5 delayed due to neutropenia  - as below, significant diarrhea causing electrolyte derangements. Likely immunotoxicity, will hold dostarlimab and start high dose steroid taper.  -  has decreased again to 32.5  - CT 4/22 without disease progression   - Despite recent scans and , she has had ongoing severe chemo/immunotherapy related toxicities and at this point would not recommend any further treatment     Immunotherapy induced diarrhea   -baseline 1-2 BMs per day, for 3 months has had 8-10 episodes; patient has not been consistently forthcoming with these complaints making her management very challenging.  -failed immodium, BRAT diet   -has tried 2 short course of steroids without improvement   -GI panel and C. Diff negative previously  -did not improve with steroids, Imodium and required IV fluids, potassium repletion   -Says it has improved since last treatment but still daily diarrhea     Opthlamamic irritation likely secondary to immunotherapy   -3 month hx of irritation, worsening   -symptoms include dryness, irritation, \"sand paper sensation\" along with erythema and blurred vision   -has been trying multiple different eye drops including drops for glaucoma. Discussed glaucoma drops unlikely to help  -No improvement with steroids, continues to have blurry vision  -Has seen a different ophthalmologist who prescribed a topical ointment, has another appt soon     Chemotherapy-induced neutropenia  -Noted 12/28, cycle 3 and 5 delayed   -Had normalized on 2/29  -Worsening neutropenia with labs 3/21 with WBC 2.41, ANC 1.02   -Held carboplatin/paclitaxel last cycle due to neutropenia, may need to drop chemotherapy entirely from treatment plan given recurrent neutropenia    -Persistent on labs 4/22 with ANC 0.77     Hypothyroid due to immunotherapy  On synthroid 100 mcg daily, taking daily, but ran out 4 days ago.  TSH 26, Free T4 0.5 on 3/21   Repeat labs TSH 19, T4 0.40   Went a " week or more without prescription of Synthroid   She has restarted Synthroid so anticipate thyroid labs will improve   -Repeat labs in 1 month    Transaminitis  -Grade 2, asymptomatic noted at cycle 2  -Per ASCO guidelines, dostarlimab held at cycle 2  -Hepatitis panel returned negative.  -Improved on most recent labs (AST 27/ALT 27)     Hypokalemia   -K 3.3 on 4/22  -patient asymptomatic   -having persistent diarrhea which is likely etiology   -continue oral K supplementation     Anxiety/Depression  -Related to generalized worries and depressed mood, worsening after holidays and with getting treatment held. No SI.  -Referral to Tarah Hart made, patient cancelled as she feels she is doing somewhat better.  -Previously started klonopin 0.5mg PRN as there is generalized anxiety component    Pain assessment was performed today as a part of patient’s care.  For patients with pain related to surgery, gynecologic malignancy or cancer treatment, the plan is as noted in the assessment/plan.  For patients with pain not related to these issues, they are to seek any further needed care from a more appropriate provider, such as PCP.      Orders Placed This Encounter   Procedures    TSH Rfx On Abnormal To Free T4     Standing Status:   Future     Standing Expiration Date:   4/26/2025     Order Specific Question:   Release to patient     Answer:   Routine Release [[id number]]     FOLLOW UP: 3 months for survivorship    Jim Lindsey MD  Resident PGY-3, Gynecology     I spent 41 minutes caring for Cheryl on this date of service. This time includes time spent by me in the following activities: preparing for the visit, reviewing tests, performing a medically appropriate examination and/or evaluation, counseling and educating the patient/family/caregiver, ordering medications, tests, or procedures, referring and communicating with other health care professionals, documenting information in the medical record, and care  coordination    Patient was seen and examined with Dr. Lindsey,  resident, who performed portions of the examination and documentation for this patient's care under my direct supervision.  I agree with the above documentation and plan.

## 2024-05-02 ENCOUNTER — HOSPITAL ENCOUNTER (OUTPATIENT)
Dept: ONCOLOGY | Facility: HOSPITAL | Age: 74
Discharge: HOME | End: 2024-05-02
Payer: MEDICARE

## 2024-06-13 ENCOUNTER — TELEPHONE (OUTPATIENT)
Dept: GYNECOLOGIC ONCOLOGY | Facility: CLINIC | Age: 74
End: 2024-06-13
Payer: MEDICARE

## 2024-06-13 ENCOUNTER — LAB (OUTPATIENT)
Dept: LAB | Facility: HOSPITAL | Age: 74
End: 2024-06-13
Payer: MEDICARE

## 2024-06-13 DIAGNOSIS — D70.1 CHEMOTHERAPY-INDUCED NEUTROPENIA: ICD-10-CM

## 2024-06-13 DIAGNOSIS — T45.1X5A CHEMOTHERAPY-INDUCED NEUTROPENIA: ICD-10-CM

## 2024-06-13 DIAGNOSIS — E87.6 HYPOKALEMIA: ICD-10-CM

## 2024-06-13 DIAGNOSIS — E03.2 HYPOTHYROIDISM DUE TO MEDICATION: ICD-10-CM

## 2024-06-13 DIAGNOSIS — R74.01 TRANSAMINITIS: ICD-10-CM

## 2024-06-13 DIAGNOSIS — Z29.89 IMMUNOTHERAPY: ICD-10-CM

## 2024-06-13 DIAGNOSIS — E03.2 HYPOTHYROIDISM DUE TO MEDICATION: Primary | ICD-10-CM

## 2024-06-13 DIAGNOSIS — C54.1 ENDOMETRIAL CANCER: ICD-10-CM

## 2024-06-13 DIAGNOSIS — C54.1 ENDOMETRIAL CANCER: Primary | ICD-10-CM

## 2024-06-13 LAB
ALBUMIN SERPL-MCNC: 3.9 G/DL (ref 3.5–5.2)
ALBUMIN/GLOB SERPL: 1.5 G/DL
ALP SERPL-CCNC: 62 U/L (ref 39–117)
ALT SERPL W P-5'-P-CCNC: 12 U/L (ref 1–33)
ANION GAP SERPL CALCULATED.3IONS-SCNC: 6 MMOL/L (ref 5–15)
AST SERPL-CCNC: 18 U/L (ref 1–32)
BASOPHILS # BLD AUTO: 0.01 10*3/MM3 (ref 0–0.2)
BASOPHILS NFR BLD AUTO: 0.2 % (ref 0–1.5)
BILIRUB SERPL-MCNC: 0.2 MG/DL (ref 0–1.2)
BUN SERPL-MCNC: 13 MG/DL (ref 8–23)
BUN/CREAT SERPL: 20.3 (ref 7–25)
CALCIUM SPEC-SCNC: 9.4 MG/DL (ref 8.6–10.5)
CHLORIDE SERPL-SCNC: 99 MMOL/L (ref 98–107)
CO2 SERPL-SCNC: 30 MMOL/L (ref 22–29)
CREAT SERPL-MCNC: 0.64 MG/DL (ref 0.57–1)
DEPRECATED RDW RBC AUTO: 47 FL (ref 37–54)
EGFRCR SERPLBLD CKD-EPI 2021: 93.4 ML/MIN/1.73
EOSINOPHIL # BLD AUTO: 0.05 10*3/MM3 (ref 0–0.4)
EOSINOPHIL NFR BLD AUTO: 1 % (ref 0.3–6.2)
ERYTHROCYTE [DISTWIDTH] IN BLOOD BY AUTOMATED COUNT: 13.3 % (ref 12.3–15.4)
GLOBULIN UR ELPH-MCNC: 2.6 GM/DL
GLUCOSE SERPL-MCNC: 93 MG/DL (ref 65–99)
HCT VFR BLD AUTO: 34.9 % (ref 34–46.6)
HGB BLD-MCNC: 11.8 G/DL (ref 12–15.9)
IMM GRANULOCYTES # BLD AUTO: 0 10*3/MM3 (ref 0–0.05)
IMM GRANULOCYTES NFR BLD AUTO: 0 % (ref 0–0.5)
LYMPHOCYTES # BLD AUTO: 1.77 10*3/MM3 (ref 0.7–3.1)
LYMPHOCYTES NFR BLD AUTO: 34.9 % (ref 19.6–45.3)
MCH RBC QN AUTO: 32.8 PG (ref 26.6–33)
MCHC RBC AUTO-ENTMCNC: 33.8 G/DL (ref 31.5–35.7)
MCV RBC AUTO: 96.9 FL (ref 79–97)
MONOCYTES # BLD AUTO: 0.6 10*3/MM3 (ref 0.1–0.9)
MONOCYTES NFR BLD AUTO: 11.8 % (ref 5–12)
NEUTROPHILS NFR BLD AUTO: 2.64 10*3/MM3 (ref 1.7–7)
NEUTROPHILS NFR BLD AUTO: 52.1 % (ref 42.7–76)
PLATELET # BLD AUTO: 236 10*3/MM3 (ref 140–450)
PMV BLD AUTO: 8.7 FL (ref 6–12)
POTASSIUM SERPL-SCNC: 4.2 MMOL/L (ref 3.5–5.2)
PROT SERPL-MCNC: 6.5 G/DL (ref 6–8.5)
RBC # BLD AUTO: 3.6 10*6/MM3 (ref 3.77–5.28)
SODIUM SERPL-SCNC: 135 MMOL/L (ref 136–145)
T4 FREE SERPL-MCNC: 0.23 NG/DL (ref 0.92–1.68)
TSH SERPL DL<=0.05 MIU/L-ACNC: 25.5 UIU/ML (ref 0.27–4.2)
WBC NRBC COR # BLD AUTO: 5.07 10*3/MM3 (ref 3.4–10.8)

## 2024-06-13 PROCEDURE — 85025 COMPLETE CBC W/AUTO DIFF WBC: CPT

## 2024-06-13 PROCEDURE — 36415 COLL VENOUS BLD VENIPUNCTURE: CPT

## 2024-06-13 PROCEDURE — 84439 ASSAY OF FREE THYROXINE: CPT

## 2024-06-13 PROCEDURE — 80053 COMPREHEN METABOLIC PANEL: CPT

## 2024-06-13 PROCEDURE — 84443 ASSAY THYROID STIM HORMONE: CPT

## 2024-06-13 NOTE — TELEPHONE ENCOUNTER
----- Message from Latasha Farr sent at 6/13/2024  9:19 AM EDT -----  Please notify patient labs look good.  Thank you!  ----- Message -----  From: Lab, Background User  Sent: 6/13/2024   8:59 AM EDT  To: Latasha Farr MD

## 2024-06-13 NOTE — TELEPHONE ENCOUNTER
----- Message from Latasha Farr sent at 6/13/2024 10:22 AM EDT -----  Message or call patient - should be off synthroid.  She is HYPER thyroid.  Repeat labs ordered for 1 months from now.  Thanks!  ----- Message -----  From: Lab, Background User  Sent: 6/13/2024   8:59 AM EDT  To: Latasha Farr MD

## 2024-06-13 NOTE — TELEPHONE ENCOUNTER
Patient notified of Lab results with verbalized understanding and offers thank you. States she has a continuing eye infection with a Dr appt on Friday to f/u for that. Does request repeat Lab work in Late July to have for her appt in Aug.  Will reach out to APRN for orders

## 2024-08-06 ENCOUNTER — TELEPHONE (OUTPATIENT)
Dept: GYNECOLOGIC ONCOLOGY | Facility: CLINIC | Age: 74
End: 2024-08-06
Payer: MEDICARE

## 2024-08-06 NOTE — TELEPHONE ENCOUNTER
Patient called requesting appointment info for bloodwork for appointment info and CT scans in September.  Advised she has lab orders entered and she does not need an appointment for lab.  She can go to any [facility] lab today if she wants or can stop at the lab in our waiting room before her appointment tomorrow.  She will have labs drawn tomorrow at the oncology draw station.  Patient advised she does not have orders or appointment for next CT scans, but she can discuss with Megan at her appointment tomorrow.      Patient also request refill for Ambien.  Last rx was written by CLAUDETTE Salinas in February for 30 tablets and no refills.  Patient is now out of medication. Office note from 2-16-24 states it is a temporary script.  Patient advised to discuss with Megan during her appointment tomorrow as she may defer to PCP.  Patient verbalized understanding.

## 2024-08-06 NOTE — PROGRESS NOTES
GYN ONCOLOGY CANCER SURVIVORSHIP VISIT    Cheryl Storm  [id number]  1950    Subjective   Chief Complaint: f/u endometrial cancer, survivorship visit       History of present illness:   Cheryl Storm is a 74 y.o. year old female who is here today for her Cancer Survivorship visit, see oncology history below. Dr Farr performed surgery in 10/2023.  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  No myometrial invasion identified.  Lymphvascular invasion present, low.  Left ovary involved by serous carcinoma (100% serous at the left ovary) left pelvic sentinel node negative.  Right pelvic node positive for isolated tumor cells.  Adjuvant chemotherapy + immunotherapy with carboplatin/paclitaxel/dostarlimab was started in 11/2023 for dedifferentiated/serous adenocarcinoma of the endometrium and stopped in 4/2024.     She is now ~4 months out since last treatment. She completed 6 cycles of chemotherapy with carboplatin and paclitaxel. Treatment was complicated by transaminitis, diarrhea, and eye irritation. Dostarlimab was held with cycle #2 due to elevated AST and ALT >3x ULN but < 10x. Dostarlimab also held with cycles 5 & 6. Cycle #3 delayed 1 week due to neutropenia. Cycle 5 delayed due to neutropenia. She experienced significant diarrhea causing electrolyte derangements. Maintenance treatment with dostarlimab was previously planned but ultimately Dr. Farr recommended no further treatment due to ongoing severe chemo/immunotherapy related toxicities. CT chest, abd, & pelvis completed 4-22-24 with no evidence of disease progression. Last CA-125 on 4-22-24 was improved and in normal range at 32.5. Pt called the office yesterday inquiring about labs, imaging, and Rx for tamika.     PCP is Dr Brijesh Choi. She was referred to our office by Dr Felicia Botello-- OB/GYN. Recently transferred to new ophthalmologist, Dr Xie  WaqarSirisha Luna denies currently following with any other provider/ specialists at this time.      Today, she is doing better. Still recovering from treatment related issues (see below). She is planning a drive back to [place] soon, where she previously lived with her ex- which is causing increased stress and anxiety at the moment. She denies vaginal bleeding, pelvic pain, changes in bowel or bladder function, new or concerning lesions, and breast problems.     Diarrhea, hypoK, transaminitis:  -Previously her diarrhea had fully resolved.  Recently she was recommended to return to an anti-inflammatory diet.  Unfortunately this triggered diarrhea again.  She only has periodic episodes of loose stool, much less frequent than before.  Denies any black or bloody stools.  -last K back to normal range at 4.2 on 6-13-24  -LFTs back to normal range, most recently on 6-13-24 AST= 18, ALT= 12.    Ophthalmic irritation:  -Emma states that her eye doctor reports eyes are 50% better.  Her vision remains very poor, and she can no longer drive because of it.  She has a helper who comes and assist with chores around the house and provided transportation to visit today.    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   -last TSH= 25.5, FT4= 0.23 on 6-13-24.  -discontinued levothyroxine on 6-13-24    Anxiety/depression, insomnia:  -Currently taking lexapro 30mg qd, per pt she increased her own dose by breaking the 20mg tablets and had enough quantity at home to continue 30mg daily. However, she is almost out.  Endorses aspect of both depression and anxiety which are both uncontrolled at this time.  Reports that she has taken Lexapro for about 20 years.  Prior to that she took Prozac.  Has never tried  any other maintenance medications.  -She was given a 30 day supply of klonopin and Ambien in January/ February 2024.  -Requesting refill of Ambien as she is not sleeping well at this time.  Reports that she is still taking Ambien, as she broke the previous tablets into 1/3rd's to make supply last. Has tried trazodone in the past which was ineffective. When she uses a combination of melatonin and ambien, she is able to fall asleep and stay asleep.   -Fear of cancer recurrence is a big trigger for anxiety, pt notes she would like repeat imaging ordered even though she is currently asymptomatic.   -She requests I send xanax as well, particularly given upcoming trip to [place]. States that she has taken xanax before and it does not cause her to feel drowsy.   -phq9= 11    Other Rx:  -Rreports that she only takes valtrex PRN for fever blisters, but has not had one in quite some time.        Cancer History:   Oncology/Hematology History   Endometrial cancer   8/8/2023 Initial Diagnosis    Patient to Dr. Felicia Botello with c/o postmenopausal bleeding. TVUS showed thickened endometrial stripe. EMB performed. Pathology showed serous endometrial cancer.  Referred to Gyn Oncology.   (Evaluation delayed due to social and insurance issues)     9/29/2023 Cancer Staged    Staging form: Corpus Uteri - Carcinoma And Carcinosarcoma, AJCC 8th Edition  - Clinical stage from 9/29/2023: FIGO Stage I (cT1, cN0, cM0) - Signed by Latasha Farr MD on 9/29/2023     10/12/2023 Imaging    CT chest, abdomen, pelvis:  Irregular bilateral cystic adnexal findings are present, appearing somewhat solid and abnormally enhancing on the right measuring 4.3 cm and multilocular, with a stellate enhancing solid component on the left measuring 7.5 cm. Findings are suspicious for extension of or metastatic involvement from reported primary GYN neoplasm. Correlate with findings on reported upcoming salpingo-oophorectomy. Otherwise no suspicious findings  for more distant metastatic involvement in the chest, abdomen and pelvis.     10/16/2023 Surgery    Robotic-assisted total laparoscopic hysterectomy, bilateral salpingo-oophorectomy, sentinel pelvic lymph node dissection, left ureterolysis, and omentectomy by Dr. Latasha Farr at [facility].    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 No myometrial invasion identified. Lymphvascular invasion present, low. Left ovary involved by serous carcinoma (100% serous at the ovary). Left pelvic sentinel node negative. Right pelvic positive for isolated tumor cells.   pT3a, pN0(i+), cM0    Molecular testing:  p53 amplified in both components  ER negative in dedifferentiated component, positive in serous (30%, 2+)  PA focally positive (5%, 2+)  Her2 negative in didifferentiated component, strongly positive (3+) in the serous compenent  PD-L1 <1  MSI intact     10/23/2023 Cancer Staged    Staging form: Corpus Uteri - Carcinoma And Carcinosarcoma, AJCC 8th Edition  - Pathologic stage from 10/23/2023: FIGO Stage IIIA (pT3a, pN0(i+), cM0) - Signed by Kathleen Fung APRN on 11/14/2023     11/3/2023 Genetic Testing    Patient has genetic testing done for personal history of endometrial cancer and family history of colorectal and pancreatic cancer.    Genetic testing via [facility] CancerNext 36-gene panel with [employer].  Ms. Storm's genetic test result was NEGATIVE for pathogenic mutations in all 36 - cancer risk genes analyzed.      11/17/2023 - 4/5/2024 Chemotherapy    OP ENDOMETRIAL Dostarlimab-gxly / CARBOplatin AUC=5 / PACLitaxel  - dostarlimab held at cycle #2 due to transaminitis  - cycle #3 delayed 1 week due to neutropenia  Dostarlimab not continued as a maintenance drug due to GI and ocular toxicity.     12/7/2023 Genetic Testing    Genetic testing NEGATIVE for pathogenic mutations in BRCA 1/2 and 34 other genes via  CancerNext panel.      2/22/2024 -  Chemotherapy    OP SUPPORTIVE HYDRATION + ANTIEMETICS     4/22/2024 Imaging    CT chest, abdomen, and pelvis    Interval surgical resection of previously seen adnexal mass and uterus. No residual pelvic mass. No evidence of metastatic disease in the chest, abdomen, or pelvis.      8/7/2024 Survivorship    Survivorship Care Plan completed and discussed with patient.  Copy of Survivorship Care Plan provided to patient and primary care provider.                       The current medication list and allergy list were reviewed and reconciled.     Past Medical History, Past Surgical History, Social History, Family History have been reviewed and are without significant changes except as mentioned.        Review of Systems   Constitutional: Negative.    HENT: Negative.     Eyes:  Positive for blurred vision.   Respiratory: Negative.     Cardiovascular: Negative.    Gastrointestinal:  Positive for diarrhea. Negative for abdominal distention, abdominal pain, blood in stool, constipation, nausea and vomiting.   Genitourinary:  Negative for decreased urine volume, difficulty urinating, dysuria, flank pain, frequency, genital sores, hematuria, pelvic pain, pelvic pressure, urgency, urinary incontinence, vaginal bleeding, vaginal discharge and vaginal pain.   Musculoskeletal: Negative.    Skin: Negative.    Neurological:  Positive for numbness (+reports peripheral neuropathy). Negative for dizziness, weakness, light-headedness, headache and memory problem.   Psychiatric/Behavioral:  Positive for sleep disturbance, depressed mood and stress. Negative for self-injury and suicidal ideas. The patient is nervous/anxious.          Objective   Physical Exam  Vitals and nursing note reviewed.   Constitutional:       General: She is not in acute distress.     Appearance: Normal appearance. She is normal weight. She is not ill-appearing, toxic-appearing or diaphoretic.   Eyes:      Comments: Patch over  "right eye.   Pulmonary:      Effort: Pulmonary effort is normal. No respiratory distress.   Genitourinary:     Comments: Pelvic exam deferred.    Skin:     General: Skin is warm and dry.   Neurological:      General: No focal deficit present.      Mental Status: She is alert and oriented to person, place, and time.      Motor: No weakness.      Gait: Gait normal.   Psychiatric:         Attention and Perception: Attention and perception normal.         Mood and Affect: Affect normal. Mood is anxious.         Speech: Speech normal.         Behavior: Behavior is cooperative.         Thought Content: Thought content normal.         Cognition and Memory: Cognition and memory normal.       Vital Signs: /71   Pulse 69   Temp 97.7 °F (36.5 °C) (Temporal)   Resp 17   Ht 162.6 cm (64.02\")   Wt 56.5 kg (124 lb 9.6 oz)   LMP  (LMP Unknown)   SpO2 96%   BMI 21.38 kg/m²   Vitals:    08/07/24 1254   PainSc: 0-No pain                                     ECOG score: 1             Result Review :    -Signatara positive on 9-9-2023 MTM/mL: 2.30     Latest Reference Range & Units 06/13/24 08:55   Sodium 136 - 145 mmol/L 135 (L)   Potassium 3.5 - 5.2 mmol/L 4.2   Chloride 98 - 107 mmol/L 99   CO2 22.0 - 29.0 mmol/L 30.0 (H)   Anion Gap 5.0 - 15.0 mmol/L 6.0   BUN 8 - 23 mg/dL 13   Creatinine 0.57 - 1.00 mg/dL 0.64   BUN/Creatinine Ratio 7.0 - 25.0  20.3   eGFR >60.0 mL/min/1.73 93.4   Glucose 65 - 99 mg/dL 93   Calcium 8.6 - 10.5 mg/dL 9.4   Alkaline Phosphatase 39 - 117 U/L 62   Total Protein 6.0 - 8.5 g/dL 6.5   Albumin 3.5 - 5.2 g/dL 3.9   Globulin gm/dL 2.6   A/G Ratio g/dL 1.5   AST (SGOT) 1 - 32 U/L 18   ALT (SGPT) 1 - 33 U/L 12   Total Bilirubin 0.0 - 1.2 mg/dL 0.2   TSH Baseline 0.270 - 4.200 uIU/mL 25.500 (H)   Free T4 0.92 - 1.68 ng/dL 0.23 (L)   WBC 3.40 - 10.80 10*3/mm3 5.07   RBC 3.77 - 5.28 10*6/mm3 3.60 (L)   Hemoglobin 12.0 - 15.9 g/dL 11.8 (L)   Hematocrit 34.0 - 46.6 % 34.9   Platelets 140 - 450 10*3/mm3 " 236   RDW 12.3 - 15.4 % 13.3   MCV 79.0 - 97.0 fL 96.9   MCH 26.6 - 33.0 pg 32.8   MCHC 31.5 - 35.7 g/dL 33.8   MPV 6.0 - 12.0 fL 8.7   RDW-SD 37.0 - 54.0 fl 47.0   Neutrophil Rel % 42.7 - 76.0 % 52.1   Lymphocyte Rel % 19.6 - 45.3 % 34.9   Monocyte Rel % 5.0 - 12.0 % 11.8   Eosinophil Rel % 0.3 - 6.2 % 1.0   Basophil Rel % 0.0 - 1.5 % 0.2   Immature Granulocyte Rel % 0.0 - 0.5 % 0.0   Neutrophils Absolute 1.70 - 7.00 10*3/mm3 2.64   Lymphocytes Absolute 0.70 - 3.10 10*3/mm3 1.77   Monocytes Absolute 0.10 - 0.90 10*3/mm3 0.60   Eosinophils Absolute 0.00 - 0.40 10*3/mm3 0.05   Basophils Absolute 0.00 - 0.20 10*3/mm3 0.01   Immature Grans, Absolute 0.00 - 0.05 10*3/mm3 0.00   (L): Data is abnormally low  (H): Data is abnormally high    Last imaging study was 4-.  .   Tumor marker:     Date Value Ref Range Status   08/07/2024 43.9 (H) 0.0 - 38.1 U/mL Final   04/22/2024 32.5 0.0 - 38.1 U/mL Final   04/04/2024 60.0 (H) 0.0 - 38.1 U/mL Final   03/21/2024 41.5 (H) 0.0 - 38.1 U/mL Final       Survivorship Needs Assessment:  Nutrition Services  Health history, treatment course, and weight trends reviewed. Discussed nutrition services offered by [facility] including oncology nutrition, diabetes management, general outpatient nutrition, exercise counseling, and weight management. The patient is not in need of referral to nutrition services at this time.     PT/Rehab  Health history, treatment course, and current status. The patient declines referral to physical therapy or rehabilitation services for weakness/strengthening.     Behavioral Health  A post-treatment depression screening has been completed. PHQ-9 results show 10-14 (Moderate Depression). The patient has not previously established mental health care. A referral is recommended at this time. The patient desires referral to [facility]      Procedure Note:            Assessment and Plan:       Diagnoses and all orders for this visit:    1. Endometrial  cancer (Primary)  Stage III endometrial cancer due to adnexal involvement   -The patient and I have reviewed her Survivorship Care Plan in detail. We discussed her diagnosis, pathology, histology, all treatments, and ongoing surveillance recommendations. All questions were answered to her satisfaction. She is in agreement with our plan for ongoing surveillance as outlined in her plan. A copy of this document was provided to her at the completion of our visit.  A copy has also been sent to her primary care provider.  -treatment completed in 4/2024, discontinued due to severe chemo/immunotherapy related toxicities   -CT C/A/P 4-22-24 without disease progression and CA-125 improved to 32.5  -We will plan for surveillance visit with pelvic exam in ~3 months, however, if more convenient to return in ~4w for med f/u and consultation with PMHNP (see below, #3 & #9-11) ok to schedule sooner than 3m  -Repeat CA-125 level today, will continue to monitor as it was elevated at dx. Signatara was also positive on 9-9-23. MTM/mL= 2.30.  -She is understanding to call with any changes in pelvic symptoms or general GYN concerns at any time between regularly scheduled visits.    -     Ambulatory Referral to Psychiatry    2. Immunotherapy    3. 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  -Advised that I do anticipate needing to resume levothyroxine, but we will wait for lab results prior to sending Rx.  I will call her once labs are available.  Important to take this medication at the same time each day on an empty stomach, at least 30 minutes outside any other food/drink or medication.  -Uncontrolled hypothyroidism is likely exacerbating baseline depression and anxiety.  I am hopeful that correcting her TSH will also improve mood and sleep.  -Repeat labs from today (8-7-24): TSH= 46.780, FT4=  <0.10. Attempted to call pt, no answer. I will try again tomorrow.  Will start at 1.6 mcg/kg/day. Rx sent for 88mcg but I do anticipate she will need higher dose. Will plan to have her return in ~4w for f/u and recheck labs at that time.  Will make dose adjustment at that time, accordingly based on repeat labs.    Health Maintenance:  -Recommended she continue annual well woman exam with general GYN even while still having surveillance pelvic exams with us. She does not plan to return to general gyn and requests that I order overdue HM screenings as her last dexa, mmg, and colonoscopy are very much overdue.  -Last colonoscopy by Dr Talib Coronel 5/2013 with polyp pathology showing tubular adenoma.   -Last bilateral screening mmg 8-11-16, BR0. Left stable mmg. Right required additional imaging. A right diagnostic with u/s was completed on 8-31-16, BI-RADS 3 with recommendation to repeat a diagnostic right in 6 months.  -No previous DEXA bone density scan on file for review    4. Immunosuppressive-induced eye inflammation  -As above, correcting thyroid should also help ocular irritation/ inflammation  -Previously no improvement with steroids  -Follow-up with ophthalmologist as scheduled, med management per specialist    5. Diarrhea, unspecified type  6. Hypokalemia  7. Transaminitis  -prev hepatitis panel returned negative   -Previous stool gastrointestinal panel with all negative results.  -Previously tried/ failed two rounds of short courses of steroids when diarrhea was severe when actively receiving treatment. At this time, she required imodium, lomotil, IVF, and potassium repletion.   -previously resolved, but again having occasional loose stools after diet change  -repeat CMP ordered to recheck LFT and potassium.   -Today (8-7-24)  -New hyponatremia, Na= 128. See addendum comment below.   -AST= 94, ALT= 53, see addendum comment below    8. Chemotherapy-induced neutropenia  -CBC today (8-7-24) completely  normal  -resolved, and now off treatment     9. Trouble in sleeping  10. Anxiety  11. Depression   -As above, mood and sleep should improve with better control of thyroid disease  -Highly recommended referral to [facility] for counseling and med mgmt. She can also assist with issues like fear of cancer recurrence. Pt agreeable and referral order placed.   -Informed patient I will not be prescribing Ambien or Xanax, as requested today.  There are safer options for both, especially considering age.  Advised that she should never take 2 sedating medications at the same time, and also very important to take all medication as prescribed.  If ever condition is uncontrolled, let prescribing provider manage medication adjustment for safety.  -Offered to go ahead and start new daily medication given uncontrolled symptoms as these often take up to 6 weeks to feel full therapeutic effects.  Has taken Prozac in the past and been on Lexapro for reportedly 20 years.  Recommended cross tapering off Lexapro and onto SNRI.  New Rx sent for duloxetine.  Of note, this may also help with peripheral neuropathy symptoms.  Patient also needs just enough more Lexapro to complete the cross taper instructions.  I wrote down the following cross taper instructions and gave to pt during visit: week 1: Take Lexapro 20 mg daily and duloxetine 30 mg daily.  Week 2: Lexapro 20 mg daily and duloxetine 60 mg daily.  Week 3: Lexapro 10 mg daily and duloxetine 60 mg daily.  Week 4: Lexapro 10 mg every other day and duloxetine 60 mg daily. After week 4, d/c lexapro. Week 5 and thereafter, continue duloxetine 60 mg daily.  She will return in about 4 weeks though I did inform patient I will defer med management recommendations to [facility] once she establishes care.  -Regarding sleep, I offered several different alternative medications to help with sleep at least short-term until mood is better controlled, and she declined all of them if I was not going to  prescribe Ambien.  Encouraged sleep hygiene.  -I of course will defer all medication management to Camila after her first appointment.  Today, I counseled Cheryl that I do not recommend she take any benzodiazepine due to risk of medication outweighing benefit.  If she does have any at home, she should never drive on these medicines even if they do not cause her to be drowsy and absolutely never take at the same time or near another dose of any other potentially sedating medication.  I will send new Rx for hydroxyzine that can be used as needed for anxiety.  Let her know this medication also commonly causes drowsiness, and may be used at bedtime for sleep as well.      -     Ambulatory Referral to Psychiatry  -     hydrOXYzine (ATARAX) 25 MG tablet; Take 1 tablet by mouth 3 (Three) Times a Day As Needed for Anxiety.  Dispense: 90 tablet; Refill: 1    -     Ambulatory Referral to Psychiatry  -     DULoxetine (CYMBALTA) 30 MG capsule; Take 1 capsule by mouth Daily for 7 days.  Dispense: 7 capsule; Refill: 0  -     DULoxetine (CYMBALTA) 60 MG capsule; Take 1 capsule by mouth Daily.  Dispense: 30 capsule; Refill: 2  -     hydrOXYzine (ATARAX) 25 MG tablet; Take 1 tablet by mouth 3 (Three) Times a Day As Needed for Anxiety.  Dispense: 90 tablet; Refill: 1    -     Ambulatory Referral to Psychiatry  -     DULoxetine (CYMBALTA) 30 MG capsule; Take 1 capsule by mouth Daily for 7 days.  Dispense: 7 capsule; Refill: 0  -     DULoxetine (CYMBALTA) 60 MG capsule; Take 1 capsule by mouth Daily.  Dispense: 30 capsule; Refill: 2    Other orders  -     escitalopram (LEXAPRO) 10 MG tablet; Take 2 tablets (20mg) daily for 2 weeks then 10mg (1 tab) daily on week 3, then 10mg (1 tab) every other day on week 4, then discontinue.  Dispense: 39 tablet; Refill: 0  -     levothyroxine (SYNTHROID, LEVOTHROID) 88 MCG tablet; Take 1 tablet by mouth Daily. Administer consistently in the morning on an empty stomach, at least 30 to 60 minutes before  food.  Do not administer within 4 hours of calcium- or iron-containing products.  Dispense: 30 tablet; Refill: 0      Pain assessment was performed today as a part of patient’s care. For patients with pain related to surgery, gynecologic malignancy or cancer treatment, the plan is as noted in the assessment/plan.  For patients with pain not related to these issues, they are to seek any further needed care from a more appropriate provider, such as PCP.           I spent 60 minutes caring for Cheryl on this date of service. This time includes time spent by me in the following activities: preparing for the visit, reviewing tests, obtaining and/or reviewing a separately obtained history, performing a medically appropriate examination and/or evaluation, counseling and educating the patient/family/caregiver, ordering medications, tests, or procedures, referring and communicating with other health care professionals, documenting information in the medical record, independently interpreting results and communicating that information with the patient/family/caregiver, and care coordination.       Follow Up  -Need repeat Na in 1 week. Will place order for CMP to also monitor LFTs.  -Need apt with me in 1 month to f/u on new meds/ med changes and repeat thyroid labs & CMP. Can plan to also complete pelvic exam at this time. Pt must attend this apt or otherwise make arrangements directly with me before ANY med refills are authorized.       Electronically signed by CLAUDETTE Greenberg on 08/07/2024      ADDENDUM 8-7-24:   -Attempted to reach pt, no answer. Left VM that I sent additional Rx for levothyroxine to pharmacy. I will attempt to reach her again tomorrow.   -Repeat Na today is low at 128. Tapering off lexapro starting today which does have the potential to cause hyponatremia. When we are able to reach pt, will attempt to call again tomorrow, will advise of low Na and instruct to begin water restriction. Will plan to  "recheck electrolytes in ~1 week and need hyponatremia workup if still low or lower. Chloride today= 88.   -LFTs again elevated. AST 18 to 94, ALT 12 to 53. Will plan to monitor closely and re-check in no more than 4w. This is likely related to uncontrolled hypothyroid. Alk phos, total protein, albumin, globulin, and total bili all WNL.  On recent CT \"normal appearance of the liver without focal liver lesion.\"         " English

## 2024-08-07 ENCOUNTER — LAB (OUTPATIENT)
Dept: LAB | Facility: HOSPITAL | Age: 74
End: 2024-08-07
Payer: MEDICARE

## 2024-08-07 ENCOUNTER — OFFICE VISIT (OUTPATIENT)
Dept: GYNECOLOGIC ONCOLOGY | Facility: CLINIC | Age: 74
End: 2024-08-07
Payer: MEDICARE

## 2024-08-07 VITALS
TEMPERATURE: 97.7 F | BODY MASS INDEX: 21.27 KG/M2 | WEIGHT: 124.6 LBS | HEART RATE: 69 BPM | DIASTOLIC BLOOD PRESSURE: 71 MMHG | OXYGEN SATURATION: 96 % | SYSTOLIC BLOOD PRESSURE: 121 MMHG | RESPIRATION RATE: 17 BRPM | HEIGHT: 64 IN

## 2024-08-07 DIAGNOSIS — E03.2 HYPOTHYROIDISM DUE TO MEDICATION: ICD-10-CM

## 2024-08-07 DIAGNOSIS — G47.9 TROUBLE IN SLEEPING: ICD-10-CM

## 2024-08-07 DIAGNOSIS — R19.7 DIARRHEA, UNSPECIFIED TYPE: ICD-10-CM

## 2024-08-07 DIAGNOSIS — D70.1 CHEMOTHERAPY-INDUCED NEUTROPENIA: ICD-10-CM

## 2024-08-07 DIAGNOSIS — C54.1 ENDOMETRIAL CANCER: ICD-10-CM

## 2024-08-07 DIAGNOSIS — E87.6 HYPOKALEMIA: ICD-10-CM

## 2024-08-07 DIAGNOSIS — F32.9 REACTIVE DEPRESSION: ICD-10-CM

## 2024-08-07 DIAGNOSIS — T45.1X5A CHEMOTHERAPY-INDUCED NEUTROPENIA: ICD-10-CM

## 2024-08-07 DIAGNOSIS — F41.1 ANXIETY, GENERALIZED: ICD-10-CM

## 2024-08-07 DIAGNOSIS — Z29.89 IMMUNOTHERAPY: ICD-10-CM

## 2024-08-07 DIAGNOSIS — R74.01 TRANSAMINITIS: ICD-10-CM

## 2024-08-07 DIAGNOSIS — C54.1 ENDOMETRIAL CANCER: Primary | ICD-10-CM

## 2024-08-07 DIAGNOSIS — H57.89: ICD-10-CM

## 2024-08-07 DIAGNOSIS — Z12.31 ENCOUNTER FOR SCREENING MAMMOGRAM FOR MALIGNANT NEOPLASM OF BREAST: ICD-10-CM

## 2024-08-07 DIAGNOSIS — T45.1X5A: ICD-10-CM

## 2024-08-07 DIAGNOSIS — Z78.0 POST-MENOPAUSAL: ICD-10-CM

## 2024-08-07 LAB
ALBUMIN SERPL-MCNC: 4.6 G/DL (ref 3.5–5.2)
ALBUMIN/GLOB SERPL: 1.9 G/DL
ALP SERPL-CCNC: 63 U/L (ref 39–117)
ALT SERPL W P-5'-P-CCNC: 53 U/L (ref 1–33)
ANION GAP SERPL CALCULATED.3IONS-SCNC: 12 MMOL/L (ref 5–15)
AST SERPL-CCNC: 94 U/L (ref 1–32)
BASOPHILS # BLD AUTO: 0.01 10*3/MM3 (ref 0–0.2)
BASOPHILS NFR BLD AUTO: 0.2 % (ref 0–1.5)
BILIRUB SERPL-MCNC: 0.3 MG/DL (ref 0–1.2)
BUN SERPL-MCNC: 10 MG/DL (ref 8–23)
BUN/CREAT SERPL: 10.3 (ref 7–25)
CALCIUM SPEC-SCNC: 10.3 MG/DL (ref 8.6–10.5)
CANCER AG125 SERPL QL: 43.9 U/ML (ref 0–38.1)
CHLORIDE SERPL-SCNC: 88 MMOL/L (ref 98–107)
CO2 SERPL-SCNC: 28 MMOL/L (ref 22–29)
CREAT SERPL-MCNC: 0.97 MG/DL (ref 0.57–1)
DEPRECATED RDW RBC AUTO: 47 FL (ref 37–54)
EGFRCR SERPLBLD CKD-EPI 2021: 61.8 ML/MIN/1.73
EOSINOPHIL # BLD AUTO: 0.03 10*3/MM3 (ref 0–0.4)
EOSINOPHIL NFR BLD AUTO: 0.5 % (ref 0.3–6.2)
ERYTHROCYTE [DISTWIDTH] IN BLOOD BY AUTOMATED COUNT: 13.6 % (ref 12.3–15.4)
GLOBULIN UR ELPH-MCNC: 2.4 GM/DL
GLUCOSE SERPL-MCNC: 100 MG/DL (ref 65–99)
HCT VFR BLD AUTO: 35.6 % (ref 34–46.6)
HGB BLD-MCNC: 12.5 G/DL (ref 12–15.9)
IMM GRANULOCYTES # BLD AUTO: 0 10*3/MM3 (ref 0–0.05)
IMM GRANULOCYTES NFR BLD AUTO: 0 % (ref 0–0.5)
LYMPHOCYTES # BLD AUTO: 1.96 10*3/MM3 (ref 0.7–3.1)
LYMPHOCYTES NFR BLD AUTO: 31.8 % (ref 19.6–45.3)
MCH RBC QN AUTO: 32.5 PG (ref 26.6–33)
MCHC RBC AUTO-ENTMCNC: 35.1 G/DL (ref 31.5–35.7)
MCV RBC AUTO: 92.5 FL (ref 79–97)
MONOCYTES # BLD AUTO: 0.63 10*3/MM3 (ref 0.1–0.9)
MONOCYTES NFR BLD AUTO: 10.2 % (ref 5–12)
NEUTROPHILS NFR BLD AUTO: 3.54 10*3/MM3 (ref 1.7–7)
NEUTROPHILS NFR BLD AUTO: 57.3 % (ref 42.7–76)
PLATELET # BLD AUTO: 238 10*3/MM3 (ref 140–450)
PMV BLD AUTO: 8.4 FL (ref 6–12)
POTASSIUM SERPL-SCNC: 3.9 MMOL/L (ref 3.5–5.2)
PROT SERPL-MCNC: 7 G/DL (ref 6–8.5)
RBC # BLD AUTO: 3.85 10*6/MM3 (ref 3.77–5.28)
SODIUM SERPL-SCNC: 128 MMOL/L (ref 136–145)
T4 FREE SERPL-MCNC: <0.1 NG/DL (ref 0.92–1.68)
TSH SERPL DL<=0.05 MIU/L-ACNC: 46.78 UIU/ML (ref 0.27–4.2)
WBC NRBC COR # BLD AUTO: 6.17 10*3/MM3 (ref 3.4–10.8)

## 2024-08-07 PROCEDURE — 80053 COMPREHEN METABOLIC PANEL: CPT

## 2024-08-07 PROCEDURE — 84443 ASSAY THYROID STIM HORMONE: CPT

## 2024-08-07 PROCEDURE — 85025 COMPLETE CBC W/AUTO DIFF WBC: CPT

## 2024-08-07 PROCEDURE — 84439 ASSAY OF FREE THYROXINE: CPT

## 2024-08-07 PROCEDURE — 36415 COLL VENOUS BLD VENIPUNCTURE: CPT

## 2024-08-07 PROCEDURE — 86304 IMMUNOASSAY TUMOR CA 125: CPT

## 2024-08-07 RX ORDER — ESCITALOPRAM OXALATE 10 MG/1
TABLET ORAL
Qty: 39 TABLET | Refills: 0 | Status: SHIPPED | OUTPATIENT
Start: 2024-08-07

## 2024-08-07 RX ORDER — HYDROXYZINE HYDROCHLORIDE 25 MG/1
25 TABLET, FILM COATED ORAL 3 TIMES DAILY PRN
Qty: 90 TABLET | Refills: 1 | Status: SHIPPED | OUTPATIENT
Start: 2024-08-07

## 2024-08-07 RX ORDER — LEVOTHYROXINE SODIUM 88 UG/1
88 TABLET ORAL DAILY
Qty: 30 TABLET | Refills: 0 | Status: SHIPPED | OUTPATIENT
Start: 2024-08-07

## 2024-08-07 RX ORDER — DULOXETIN HYDROCHLORIDE 60 MG/1
60 CAPSULE, DELAYED RELEASE ORAL DAILY
Qty: 30 CAPSULE | Refills: 2 | Status: SHIPPED | OUTPATIENT
Start: 2024-08-07

## 2024-08-07 RX ORDER — DULOXETIN HYDROCHLORIDE 30 MG/1
30 CAPSULE, DELAYED RELEASE ORAL DAILY
Qty: 7 CAPSULE | Refills: 0 | Status: SHIPPED | OUTPATIENT
Start: 2024-08-07 | End: 2024-08-14

## 2024-08-08 ENCOUNTER — TELEPHONE (OUTPATIENT)
Dept: GYNECOLOGIC ONCOLOGY | Facility: CLINIC | Age: 74
End: 2024-08-08
Payer: MEDICARE

## 2024-08-08 DIAGNOSIS — E87.1 HYPONATREMIA: Primary | ICD-10-CM

## 2024-08-08 NOTE — TELEPHONE ENCOUNTER
----- Message from Megan Schwartz sent at 8/8/2024  3:07 PM EDT -----  Please call Cheryl:  -Thyroid labs abnormal. I would like for her to resume medication. I [place] [facility] [place]  [place]iance is very important.  I do anticipate she will require a higher dose but for now take once daily every morning at the same time on an empty stomach.  No food, drink, or other medication within 30 minutes of taking your levothyroxine.  Currently scheduled to see me back in November, but lets move this up to 4 weeks from now.  We will plan to recheck thyroid labs and make further dose adjustments at that time.   -Sodium level low.  Please reduce your overall water intake for the next week, and recheck this lab next week.  I will go ahead and place order.  You do not have to have an appointment for lab draw.  -Liver function test are increased again.  This may be related to uncontrolled thyroid disease.  We will monitor closely and recheck this lab at the 4-week appointment.  - level did increase slightly which gets my attention.  For any changes or concerning symptoms before next appointment please call the office.

## 2024-08-08 NOTE — TELEPHONE ENCOUNTER
Patient called and notified of Providers comments and recommendations for lab results with verbalized understanding.  Will start taking thyroid med during early morning when gets up for bathroom and will lay back down to finish sleep, understands need for consistency with medication. Will lay back on water intake for sodium.

## 2024-08-30 ENCOUNTER — TELEPHONE (OUTPATIENT)
Dept: GYNECOLOGIC ONCOLOGY | Facility: CLINIC | Age: 74
End: 2024-08-30
Payer: MEDICARE

## 2024-08-30 DIAGNOSIS — E87.1 HYPONATREMIA: ICD-10-CM

## 2024-08-30 DIAGNOSIS — E87.6 HYPOKALEMIA: ICD-10-CM

## 2024-08-30 DIAGNOSIS — E03.2 HYPOTHYROIDISM DUE TO MEDICATION: ICD-10-CM

## 2024-08-30 DIAGNOSIS — C54.1 ENDOMETRIAL CANCER: Primary | ICD-10-CM

## 2024-08-30 DIAGNOSIS — R74.01 TRANSAMINITIS: ICD-10-CM

## 2024-08-30 DIAGNOSIS — Z29.89 IMMUNOTHERAPY: ICD-10-CM

## 2024-08-30 RX ORDER — LEVOTHYROXINE SODIUM 88 UG/1
88 TABLET ORAL DAILY
Qty: 30 TABLET | Refills: 0 | Status: SHIPPED | OUTPATIENT
Start: 2024-08-30

## 2024-08-30 RX ORDER — LEVOTHYROXINE SODIUM 88 UG/1
88 TABLET ORAL DAILY
Qty: 30 TABLET | Refills: 0 | Status: CANCELLED | OUTPATIENT
Start: 2024-08-30

## 2024-08-30 NOTE — TELEPHONE ENCOUNTER
Caller: Cheryl Storm    Relationship: Self    Best call back number: [phone]    Requested Prescriptions:   Requested Prescriptions     Pending Prescriptions Disp Refills    levothyroxine (SYNTHROID, LEVOTHROID) 88 MCG tablet 30 tablet 0     Sig: Take 1 tablet by mouth Daily. Administer consistently in the morning on an empty stomach, at least 30 to 60 minutes before food.  Do not administer within 4 hours of calcium- or iron-containing products.        Pharmacy where request should be sent: [facility] #[id number] - [place], [place] - [address] - [phone]  - [phone]     Last office visit with prescribing clinician: 4/26/2024   Last telemedicine visit with prescribing clinician: Visit date not found   Next office visit with prescribing clinician: Visit date not found     Additional details provided by patient: PT ALSO REQUESTING A MONTHS SUPPLY OF AMBIEN     Does the patient have less than a 3 day supply:  [x] Yes  [] No    Would you like a call back once the refill request has been completed: [] Yes [x] No    If the office needs to give you a call back, can they leave a voicemail: [] Yes [x] No

## 2024-08-30 NOTE — TELEPHONE ENCOUNTER
Called pt to advise her of what CLAUDETTE King stated  Our schedulers sent her a [employer] message with her appt details  Pt verbalized an understanding

## 2024-08-30 NOTE — TELEPHONE ENCOUNTER
Please return call to pt:    -I placed repeat lab orders in the computer earlier today. She can have them completed at any [facility] lab, no apt required.   -Please assist her with scheduling a follow up apt with me sometime next week. Would prefer she complete labs beforehand but if unable we can plan to do them at time of visit.   -I went ahead and sent 1 RF of synthroid to pharmacy so she does not run out. But we may end up adjusting dose based on repeat labs.   -Let her know we will be sure to forward records to Dr Taylor, as requested.

## 2024-08-30 NOTE — TELEPHONE ENCOUNTER
Caller: Cheryl Storm    Relationship: Self    Best call back number: [phone]    What is the best time to reach you: ANYTIME    Who are you requesting to speak with (clinical staff, provider,  specific staff member): SCHEDULING     What was the call regarding: PT IS CALLING TO REQUEST A LAB APPT SCHEDULED TO HAVE HER THYROID CHECKED  ALSO REQUESTING RECORDS FAXED OVER TO [facility]   ATTN DR MUSTAFA   PHONE [phone]

## 2024-09-03 ENCOUNTER — LAB (OUTPATIENT)
Dept: LAB | Facility: HOSPITAL | Age: 74
End: 2024-09-03
Payer: MEDICARE

## 2024-09-03 DIAGNOSIS — E87.6 HYPOKALEMIA: ICD-10-CM

## 2024-09-03 DIAGNOSIS — E87.1 HYPONATREMIA: ICD-10-CM

## 2024-09-03 DIAGNOSIS — Z29.89 IMMUNOTHERAPY: ICD-10-CM

## 2024-09-03 DIAGNOSIS — R74.01 TRANSAMINITIS: ICD-10-CM

## 2024-09-03 DIAGNOSIS — C54.1 ENDOMETRIAL CANCER: ICD-10-CM

## 2024-09-03 DIAGNOSIS — E03.2 HYPOTHYROIDISM DUE TO MEDICATION: ICD-10-CM

## 2024-09-03 LAB
ALBUMIN SERPL-MCNC: 4.8 G/DL (ref 3.5–5.2)
ALBUMIN/GLOB SERPL: 1.9 G/DL
ALP SERPL-CCNC: 66 U/L (ref 39–117)
ALT SERPL W P-5'-P-CCNC: 16 U/L (ref 1–33)
ANION GAP SERPL CALCULATED.3IONS-SCNC: 8 MMOL/L (ref 5–15)
AST SERPL-CCNC: 24 U/L (ref 1–32)
BASOPHILS # BLD AUTO: 0.03 10*3/MM3 (ref 0–0.2)
BASOPHILS NFR BLD AUTO: 0.5 % (ref 0–1.5)
BILIRUB SERPL-MCNC: 0.5 MG/DL (ref 0–1.2)
BUN SERPL-MCNC: 14 MG/DL (ref 8–23)
BUN/CREAT SERPL: 20.3 (ref 7–25)
CALCIUM SPEC-SCNC: 9.2 MG/DL (ref 8.6–10.5)
CANCER AG125 SERPL QL: 50.6 U/ML (ref 0–38.1)
CHLORIDE SERPL-SCNC: 90 MMOL/L (ref 98–107)
CO2 SERPL-SCNC: 28 MMOL/L (ref 22–29)
CREAT SERPL-MCNC: 0.69 MG/DL (ref 0.57–1)
DEPRECATED RDW RBC AUTO: 50.3 FL (ref 37–54)
EGFRCR SERPLBLD CKD-EPI 2021: 91.8 ML/MIN/1.73
EOSINOPHIL # BLD AUTO: 0.02 10*3/MM3 (ref 0–0.4)
EOSINOPHIL NFR BLD AUTO: 0.3 % (ref 0.3–6.2)
ERYTHROCYTE [DISTWIDTH] IN BLOOD BY AUTOMATED COUNT: 14.8 % (ref 12.3–15.4)
GLOBULIN UR ELPH-MCNC: 2.5 GM/DL
GLUCOSE SERPL-MCNC: 97 MG/DL (ref 65–99)
HCT VFR BLD AUTO: 34.7 % (ref 34–46.6)
HGB BLD-MCNC: 12.3 G/DL (ref 12–15.9)
IMM GRANULOCYTES # BLD AUTO: 0.01 10*3/MM3 (ref 0–0.05)
IMM GRANULOCYTES NFR BLD AUTO: 0.2 % (ref 0–0.5)
LYMPHOCYTES # BLD AUTO: 1.63 10*3/MM3 (ref 0.7–3.1)
LYMPHOCYTES NFR BLD AUTO: 26.1 % (ref 19.6–45.3)
MCH RBC QN AUTO: 32.5 PG (ref 26.6–33)
MCHC RBC AUTO-ENTMCNC: 35.4 G/DL (ref 31.5–35.7)
MCV RBC AUTO: 91.6 FL (ref 79–97)
MONOCYTES # BLD AUTO: 0.89 10*3/MM3 (ref 0.1–0.9)
MONOCYTES NFR BLD AUTO: 14.3 % (ref 5–12)
NEUTROPHILS NFR BLD AUTO: 3.66 10*3/MM3 (ref 1.7–7)
NEUTROPHILS NFR BLD AUTO: 58.6 % (ref 42.7–76)
PLATELET # BLD AUTO: 267 10*3/MM3 (ref 140–450)
PMV BLD AUTO: 8.1 FL (ref 6–12)
POTASSIUM SERPL-SCNC: 4.9 MMOL/L (ref 3.5–5.2)
PROT SERPL-MCNC: 7.3 G/DL (ref 6–8.5)
RBC # BLD AUTO: 3.79 10*6/MM3 (ref 3.77–5.28)
SODIUM SERPL-SCNC: 126 MMOL/L (ref 136–145)
TSH SERPL DL<=0.05 MIU/L-ACNC: 3.87 UIU/ML (ref 0.27–4.2)
WBC NRBC COR # BLD AUTO: 6.24 10*3/MM3 (ref 3.4–10.8)

## 2024-09-03 PROCEDURE — 85025 COMPLETE CBC W/AUTO DIFF WBC: CPT

## 2024-09-03 PROCEDURE — 36415 COLL VENOUS BLD VENIPUNCTURE: CPT

## 2024-09-03 PROCEDURE — 80053 COMPREHEN METABOLIC PANEL: CPT

## 2024-09-03 PROCEDURE — 86304 IMMUNOASSAY TUMOR CA 125: CPT

## 2024-09-03 PROCEDURE — 84443 ASSAY THYROID STIM HORMONE: CPT

## 2024-09-03 RX ORDER — ESCITALOPRAM OXALATE 10 MG/1
TABLET ORAL
Qty: 39 TABLET | Refills: 0 | OUTPATIENT
Start: 2024-09-03

## 2024-09-04 ENCOUNTER — LAB (OUTPATIENT)
Dept: LAB | Facility: HOSPITAL | Age: 74
End: 2024-09-04
Payer: MEDICARE

## 2024-09-04 ENCOUNTER — OFFICE VISIT (OUTPATIENT)
Dept: GYNECOLOGIC ONCOLOGY | Facility: CLINIC | Age: 74
End: 2024-09-04
Payer: MEDICARE

## 2024-09-04 VITALS
HEART RATE: 76 BPM | SYSTOLIC BLOOD PRESSURE: 113 MMHG | RESPIRATION RATE: 18 BRPM | OXYGEN SATURATION: 97 % | DIASTOLIC BLOOD PRESSURE: 68 MMHG | BODY MASS INDEX: 19.85 KG/M2 | TEMPERATURE: 97.2 F | WEIGHT: 116.3 LBS | HEIGHT: 64 IN

## 2024-09-04 DIAGNOSIS — E03.2 HYPOTHYROIDISM DUE TO MEDICATION: ICD-10-CM

## 2024-09-04 DIAGNOSIS — C54.1 ENDOMETRIAL CANCER: Primary | ICD-10-CM

## 2024-09-04 DIAGNOSIS — F41.1 ANXIETY, GENERALIZED: ICD-10-CM

## 2024-09-04 DIAGNOSIS — E87.1 HYPONATREMIA: ICD-10-CM

## 2024-09-04 DIAGNOSIS — F32.A DEPRESSION, UNSPECIFIED DEPRESSION TYPE: ICD-10-CM

## 2024-09-04 DIAGNOSIS — Z12.11 SCREENING FOR COLON CANCER: ICD-10-CM

## 2024-09-04 DIAGNOSIS — T45.1X5A: ICD-10-CM

## 2024-09-04 DIAGNOSIS — G47.9 TROUBLE IN SLEEPING: ICD-10-CM

## 2024-09-04 DIAGNOSIS — R74.01 TRANSAMINITIS: ICD-10-CM

## 2024-09-04 DIAGNOSIS — H57.89: ICD-10-CM

## 2024-09-04 LAB
OSMOLALITY SERPL: 269 MOSM/KG (ref 275–295)
OSMOLALITY UR: 274 MOSM/KG (ref 300–1100)
SODIUM UR-SCNC: <20 MMOL/L

## 2024-09-04 PROCEDURE — 84300 ASSAY OF URINE SODIUM: CPT

## 2024-09-04 PROCEDURE — 83930 ASSAY OF BLOOD OSMOLALITY: CPT

## 2024-09-04 PROCEDURE — 83935 ASSAY OF URINE OSMOLALITY: CPT

## 2024-09-04 RX ORDER — LEVOTHYROXINE SODIUM 88 UG/1
88 TABLET ORAL DAILY
Qty: 90 TABLET | Refills: 1 | Status: SHIPPED | OUTPATIENT
Start: 2024-09-04

## 2024-09-04 NOTE — PROGRESS NOTES
GYN ONCOLOGY CANCER SURVEILLANCE FOLLOW-UP    Cheryl Storm  [id number]  1950    Subjective   Chief Complaint: Follow up, lab & med recheck      History of present illness:    Cheryl Storm is a 74 y.o. year old female who is here today for follow up. She is known to this office for a h/o endometrial cancer, see cancer history below.     Endometrial Cancer  -Treatment discontinued in 2024 due to poor toleration/ treatment associated toxicities.   -Post treatment imaging on 24 showed no evidence of metastatic disease in the chest, abd, or pelvis.  -CA-125 initially elevated, then improved to normal range after treatment. [place] was also positive on 23. MTM/mL= 2.30.   -Repeat CA-125 the past two months are increasin/7= 43.9, yesterday= 50.6.    Diarrhea, transaminitis:  -diarrhea resolved  -no abd pain. CT abd/pelvis from 2024 showing  normal appearance of liver without focal liver lesion.   -Repeat LFTs from yesterday WNL. Alk phos, total protein, albumin, globulin, and total bili also WNL from CMP yesterday.       Hyponatremia:  -persistent, worsening. Na 128 to 126  -normal glucose, renal fx  -now off SSRI   -not on any diuretics or other potential med triggers  -denied vomiting/ diarrhea, no obvious si/sx dehydration. Denies alcohol use.   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 Continues compliantly, no omitted doses.   -Labs much improved on re-check. TSH 46.780 to 3.870.     Anxiety/ Depression, insomnia:  -Last visit cross tapered off lexapro and onto  duloxetine.  -Continues duloxetine 60mg qd  -Today she reports that depression and anxiety are both notably improved. Of note, hypothyroid also now controlled.   -She has not tried hydroxyzine yet, this was prescribed last visit PRN anxiety.   -Previously cancelled her consult apt with MANOHAR Jensen 8/26/24  -again [facility] Maintance:  -Now scheduled for overdue DEXA and mmg on 11/8/24  -Last colonoscopy 5/2013 by Dr Coronel with polyp pathology showing tubular adenoma.     Other:  -Pt has medication bottles in a zip lock bag and states they are previous chemo meds and she would like to dispose of them. Inside the bag were bottles of ondansetron, olanzapine, and Celebrex (written by PCP).         Cancer History:   Oncology/Hematology History   Endometrial cancer   8/8/2023 Initial Diagnosis    Patient to Dr. Felicia Botello with c/o postmenopausal bleeding. TVUS showed thickened endometrial stripe. EMB performed. Pathology showed serous endometrial cancer.  Referred to Gyn Oncology.   (Evaluation delayed due to social and insurance issues)     9/29/2023 Cancer Staged    Staging form: Corpus Uteri - Carcinoma And Carcinosarcoma, AJCC 8th Edition  - Clinical stage from 9/29/2023: FIGO Stage I (cT1, cN0, cM0) - Signed by Latasha Farr MD on 9/29/2023     10/12/2023 Imaging    CT chest, abdomen, pelvis:  Irregular bilateral cystic adnexal findings are present, appearing somewhat solid and abnormally enhancing on the right measuring 4.3 cm and multilocular, with a stellate enhancing solid component on the left measuring 7.5 cm. Findings are suspicious for extension of or metastatic involvement from reported primary GYN neoplasm. Correlate with findings on reported upcoming salpingo-oophorectomy. Otherwise no suspicious findings for more distant metastatic involvement in the chest, abdomen and pelvis.     10/16/2023 Surgery    Robotic-assisted total laparoscopic hysterectomy, bilateral salpingo-oophorectomy,  sentinel pelvic lymph node dissection, left ureterolysis, and omentectomy by Dr. Latasha Farr at [facility].    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 No myometrial invasion identified. Lymphvascular invasion present, low. Left ovary involved by serous carcinoma (100% serous at the ovary). Left pelvic sentinel node negative. Right pelvic positive for isolated tumor cells.   pT3a, pN0(i+), cM0    Molecular testing:  p53 amplified in both components  ER negative in dedifferentiated component, positive in serous (30%, 2+)  CT focally positive (5%, 2+)  Her2 negative in didifferentiated component, strongly positive (3+) in the serous compenent  PD-L1 <1  MSI intact     10/23/2023 Cancer Staged    Staging form: Corpus Uteri - Carcinoma And Carcinosarcoma, AJCC 8th Edition  - Pathologic stage from 10/23/2023: FIGO Stage IIIA (pT3a, pN0(i+), cM0) - Signed by Kathleen Fung APRN on 11/14/2023     11/3/2023 Genetic Testing    Patient has genetic testing done for personal history of endometrial cancer and family history of colorectal and pancreatic cancer.    Genetic testing via [employer] CancerNext 36-gene panel with [employer].  Ms. Storm's genetic test result was NEGATIVE for pathogenic mutations in all 36 - cancer risk genes analyzed.      11/17/2023 - 4/5/2024 Chemotherapy    OP ENDOMETRIAL Dostarlimab-gxly / CARBOplatin AUC=5 / PACLitaxel  - dostarlimab held at cycle #2 due to transaminitis  - cycle #3 delayed 1 week due to neutropenia  Dostarlimab not continued as a maintenance drug due to GI and ocular toxicity.     12/7/2023 Genetic Testing    Genetic testing NEGATIVE for pathogenic mutations in BRCA 1/2 and 34 other genes via CancerNext panel.      2/22/2024 -  Chemotherapy    OP SUPPORTIVE HYDRATION + ANTIEMETICS     4/22/2024 Imaging    CT chest, abdomen, and pelvis    Interval surgical resection of previously  "seen adnexal mass and uterus. No residual pelvic mass. No evidence of metastatic disease in the chest, abdomen, or pelvis.      8/7/2024 Survivorship    Survivorship Care Plan completed and discussed with patient.  Copy of Survivorship Care Plan provided to patient and primary care provider.                         The current medication list and allergy list were reviewed and reconciled.     Past Medical History, Past Surgical History, Social History, Family History have been reviewed and are without significant changes except as mentioned.      Review of Systems        Objective   Physical Exam  Vitals and nursing note reviewed.   Constitutional:       General: She is not in acute distress.     Appearance: Normal appearance.   Eyes:      General: No scleral icterus.        Right eye: No discharge.         Left eye: No discharge.      Comments: Bilateral upper and lower lids appear swollen and red.    Neck:      Thyroid: No thyromegaly.      Vascular: No JVD.   Pulmonary:      Effort: Pulmonary effort is normal. No respiratory distress.   Musculoskeletal:      Cervical back: Neck supple.   Skin:     General: Skin is warm and dry.      Capillary Refill: Capillary refill takes less than 2 seconds.   Neurological:      Mental Status: She is alert and oriented to person, place, and time.   Psychiatric:         Attention and Perception: Attention and perception normal.         Mood and Affect: Mood and affect normal.         Speech: Speech normal.         Behavior: Behavior normal. Behavior is cooperative.         Thought Content: Thought content normal.         Cognition and Memory: Cognition and memory normal.         Judgment: Judgment normal.       Vital Signs: /68   Pulse 76   Temp 97.2 °F (36.2 °C) (Temporal)   Resp 18   Ht 162.6 cm (64\")   Wt 52.8 kg (116 lb 4.8 oz)   LMP  (LMP Unknown)   SpO2 97%   BMI 19.96 kg/m²   Vitals:    09/04/24 1340   PainSc:   4   PainLoc: Eye  Comment: bilateral         "                             ECOG score: 1             Result Review :     Latest Reference Range & Units 09/03/24 11:36   Sodium 136 - 145 mmol/L 126 (L)   Potassium 3.5 - 5.2 mmol/L 4.9   Chloride 98 - 107 mmol/L 90 (L)   CO2 22.0 - 29.0 mmol/L 28.0   Anion Gap 5.0 - 15.0 mmol/L 8.0   BUN 8 - 23 mg/dL 14   Creatinine 0.57 - 1.00 mg/dL 0.69   BUN/Creatinine Ratio 7.0 - 25.0  20.3   eGFR >60.0 mL/min/1.73 91.8   Glucose 65 - 99 mg/dL 97   Calcium 8.6 - 10.5 mg/dL 9.2   Alkaline Phosphatase 39 - 117 U/L 66   Total Protein 6.0 - 8.5 g/dL 7.3   Albumin 3.5 - 5.2 g/dL 4.8   Globulin gm/dL 2.5   A/G Ratio g/dL 1.9   AST (SGOT) 1 - 32 U/L 24   ALT (SGPT) 1 - 33 U/L 16   Total Bilirubin 0.0 - 1.2 mg/dL 0.5   TSH Baseline 0.270 - 4.200 uIU/mL 3.870   WBC 3.40 - 10.80 10*3/mm3 6.24   RBC 3.77 - 5.28 10*6/mm3 3.79   Hemoglobin 12.0 - 15.9 g/dL 12.3   Hematocrit 34.0 - 46.6 % 34.7   Platelets 140 - 450 10*3/mm3 267   RDW 12.3 - 15.4 % 14.8   MCV 79.0 - 97.0 fL 91.6   MCH 26.6 - 33.0 pg 32.5   MCHC 31.5 - 35.7 g/dL 35.4   MPV 6.0 - 12.0 fL 8.1   RDW-SD 37.0 - 54.0 fl 50.3   Neutrophil Rel % 42.7 - 76.0 % 58.6   Lymphocyte Rel % 19.6 - 45.3 % 26.1   Monocyte Rel % 5.0 - 12.0 % 14.3 (H)   Eosinophil Rel % 0.3 - 6.2 % 0.3   Basophil Rel % 0.0 - 1.5 % 0.5   Immature Granulocyte Rel % 0.0 - 0.5 % 0.2   Neutrophils Absolute 1.70 - 7.00 10*3/mm3 3.66   Lymphocytes Absolute 0.70 - 3.10 10*3/mm3 1.63   Monocytes Absolute 0.10 - 0.90 10*3/mm3 0.89   Eosinophils Absolute 0.00 - 0.40 10*3/mm3 0.02   Basophils Absolute 0.00 - 0.20 10*3/mm3 0.03   Immature Grans, Absolute 0.00 - 0.05 10*3/mm3 0.01    0.0 - 38.1 U/mL 50.6 (H)   (L): Data is abnormally low  (H): Data is abnormally high     Last imaging study was 4/22/24.   Tumor marker:     Date Value Ref Range Status   09/03/2024 50.6 (H) 0.0 - 38.1 U/mL Final   08/07/2024 43.9 (H) 0.0 - 38.1 U/mL Final   04/22/2024 32.5 0.0 - 38.1 U/mL Final   04/04/2024 60.0 (H) 0.0 - 38.1 U/mL  Final       PHQ-9 Total Score:      Procedure Note:            Assessment and Plan:        Diagnoses and all orders for this visit:    1. Endometrial cancer (Primary)  -treatment completed in 4/2024, discontinued due to severe chemo/immunotherapy related toxicities   -CT C/A/P 4-22-24 without disease progression and CA-125 improved to 32.5  -Due to time constraints and more than 30 minutes spent face to face in room with pt, unable to complete pelvic exam today.   -Discussed that I am concerned for steadily increasing tumor marker off treatment. Repeat imaging ordered. Will f/u in ~4 weeks to review CTs. OK to push apt out to next day if CTs get scheduled after our 4w f/u.  -Meds taken to checkout desk and instructed pt to dispose of at [facility] drop off. I did suggest she keep zofran to have on hand PRN N/V. Also counseled that celebrex was not chemo or treatment associated med but rather an NSAID used for pain/ inflammation. Reminded if using to avoid other NSAIDs and risk of bleeding & kidney damage with use of this med.   -     CT Chest With Contrast; Future  -     CT Abdomen Pelvis With Contrast; Future    2. Hypothyroidism due to medication  -Very happy to see improved lab and symptoms from hypothyroidism  -continue current dose of levothyroxine (88mcg qd). RF sent. Again advised of importance with med compliance including no omitted doses, -med taken on empty stomach qAM at least 30 min outside intake of other med/ food.   -repeat TSH, FT4 on return   -     levothyroxine (SYNTHROID, LEVOTHROID) 88 MCG tablet; Take 1 tablet by mouth Daily. Administer consistently in the morning on an empty stomach, at least 30 to 60 minutes before food.  Do not administer within 4 hours of calcium- or iron-containing products.  Dispense: 90 tablet; Refill: 1    3. Hyponatremia  -presumed hypotonic hyponatremia  -TSH WNL  -recommended water restriction  -w/u initiated  -     Sodium, Urine, Random - Urine, Clean  Catch; Future  -     Osmolality, Urine - Urine, Clean Catch; Future  -     Osmolality, Serum; Future    4. Screening for colon cancer  -offered to refer back to Dr Coronel. Prefers a different GI provider. Referral placed for Dr Irizarry.   -     Ambulatory Referral For Screening Colonoscopy    5. Trouble in sleeping  6. Anxiety, generalized  7. Depression, unspecified depression type  -Again, advised that I will not be prescribing any benzodiazepine for anxiety or sedative-hypnotic for sleep such as Ambien. I do not recommend she continue either as risk outweigh benefit in my opinion and many alternative safer options, susan with her age.   -Mood likely improved with control of thyroid ds   -Happy to hear symptoms are improved on new maintenance med. Continue duloxetine 60mg once daily.   -encouraged her to try hydroxyzine when she is at home with no plans/ apts to see how it will affect her, cautioned that it may cause drowsiness and therefore if anxiety at bedtime this would be good option.  -Given complex med history and control of depression, anxiety, and sleep I do still recommend she re-schedule with Camila and will defer med mgmt recommendations to her once she establishes. Pt to r/s at checkout.     8. Immunosuppressive-induced eye inflammation  -following with ophthalmology. Dr Collier is glaucoma specialist. Dr Hudson Swanson is ophthalmologist.   -I will ask  staff to fax records from last two office visits including note and lab results to [facility] as requested by pt.   -med management per specialist     9. Transaminitis  -resolved, repeat AST (24) and ALT (16)  -previous LFT elevation last month likely related to uncontrolled hypothyroid.    Pain assessment was performed today as a part of patient’s care.  For patients with pain related to surgery, gynecologic malignancy or cancer treatment, the plan is as noted in the assessment/plan.  For patients with pain not related to these issues, they  are to seek any further needed care from a more appropriate provider, such as PCP.    Today I have spent a total of 50 minutes caring for Cheryl Storm.  This includes time spent preparing for the visit, reviewing tests, performing a medically appropriate examination and/or evaluation , counseling and educating the patient/family/caregiver, ordering medications, tests, or procedures and documenting information in the medical record.        Follow-up:    Return to clinic in 4 weeks.   -review CT chest, abd, pelvis  -repeat TSH, bmp, ca125. Lab orders placed and dated for anticipated collection of 10-13-24  -pelvic exam     Electronically signed by CLAUDETTE Greenberg on 09/04/2024    ADDENDUM 9-5-24: I called and spoke with pt regarding urine sodium, and urine/serum osmolality results from yesterday. Will plan to set her up for infusion apt sometime in the next ~1 week for 1L NS over 1 hour. Supportive plan signed. Will recheck Na and other labs as planned during f/u apt next month.  During our phone conversation Cheryl mentioned that she spoke with her therapist earlier today who suggested she reach out to a  to see if any resources would be available to her or covered by Medicare such as assistance around the house and transportation to medical appointments given recent visual decline. Referral placed to oncology SW.

## 2024-09-06 DIAGNOSIS — F41.1 ANXIETY, GENERALIZED: ICD-10-CM

## 2024-09-06 DIAGNOSIS — F32.9 REACTIVE DEPRESSION: ICD-10-CM

## 2024-09-06 RX ORDER — DULOXETIN HYDROCHLORIDE 60 MG/1
60 CAPSULE, DELAYED RELEASE ORAL DAILY
Qty: 90 CAPSULE | Refills: 3 | Status: SHIPPED | OUTPATIENT
Start: 2024-09-06

## 2024-09-09 ENCOUNTER — DOCUMENTATION (OUTPATIENT)
Dept: OTHER | Facility: HOSPITAL | Age: 74
End: 2024-09-09
Payer: MEDICARE

## 2024-09-09 NOTE — PROGRESS NOTES
Pt returned call and asked about resources for transportation and in home help with sorting through bills, grocery shopping and light house keeping. SW asked if pt was interested in a [employer] referral, to which she denied due to long wait or transport times. ANTONY explained she might have resources through insurance, but explained it is not a guarantee. ANTONY will meet with pt in infusion to get insurance member number and call about benefits with pt.   SW then explained there are home assistance companies, but explained pricing for this and that it is private pay. Pt explained she does have two teenage boys that she pays for assistance that are cheaper, and thanked ANTONY for the information. ANTONY encouraged pt to reach out should other needs arise in the meantime and will meet with pt 9/13. Pt thanked ANTONY and was agreeable to plan.

## 2024-09-09 NOTE — PROGRESS NOTES
SW contacted pt to provide support and assist with psychosocial needs. Pt was unavailable, therefore SW left voicemail explaining nature of the call and provided contact information should needs arise. SW will be available ongoing.    Rossi Root, MSW,CSW

## 2024-09-13 ENCOUNTER — HOSPITAL ENCOUNTER (OUTPATIENT)
Dept: ONCOLOGY | Facility: HOSPITAL | Age: 74
Discharge: HOME | End: 2024-09-13
Payer: MEDICARE

## 2024-09-13 VITALS
SYSTOLIC BLOOD PRESSURE: 136 MMHG | WEIGHT: 116 LBS | HEIGHT: 64 IN | TEMPERATURE: 98.2 F | RESPIRATION RATE: 16 BRPM | HEART RATE: 90 BPM | DIASTOLIC BLOOD PRESSURE: 72 MMHG | BODY MASS INDEX: 19.81 KG/M2

## 2024-09-13 DIAGNOSIS — C54.1 ENDOMETRIAL CANCER: Primary | ICD-10-CM

## 2024-09-13 PROCEDURE — 96360 HYDRATION IV INFUSION INIT: CPT

## 2024-09-13 PROCEDURE — 25810000003 SODIUM CHLORIDE 0.9 % SOLUTION: Performed by: OBSTETRICS & GYNECOLOGY

## 2024-09-13 RX ADMIN — SODIUM CHLORIDE 1000 ML: 9 INJECTION, SOLUTION INTRAVENOUS at 14:26

## 2024-09-19 ENCOUNTER — HOSPITAL ENCOUNTER (OUTPATIENT)
Dept: CT IMAGING | Facility: HOSPITAL | Age: 74
Discharge: HOME | End: 2024-09-19
Admitting: NURSE PRACTITIONER
Payer: MEDICARE

## 2024-09-19 DIAGNOSIS — C54.1 ENDOMETRIAL CANCER: ICD-10-CM

## 2024-09-19 PROCEDURE — 74177 CT ABD & PELVIS W/CONTRAST: CPT

## 2024-09-19 PROCEDURE — 71260 CT THORAX DX C+: CPT

## 2024-09-19 PROCEDURE — 25510000001 IOPAMIDOL 61 % SOLUTION: Performed by: NURSE PRACTITIONER

## 2024-09-19 RX ORDER — IOPAMIDOL 612 MG/ML
85 INJECTION, SOLUTION INTRAVASCULAR
Status: COMPLETED | OUTPATIENT
Start: 2024-09-19 | End: 2024-09-19

## 2024-09-19 RX ADMIN — IOPAMIDOL 85 ML: 612 INJECTION, SOLUTION INTRAVENOUS at 14:41

## 2024-09-20 ENCOUNTER — TELEPHONE (OUTPATIENT)
Dept: GYNECOLOGIC ONCOLOGY | Facility: CLINIC | Age: 74
End: 2024-09-20
Payer: MEDICARE

## 2024-10-17 ENCOUNTER — TELEPHONE (OUTPATIENT)
Dept: GYNECOLOGIC ONCOLOGY | Facility: CLINIC | Age: 74
End: 2024-10-17
Payer: MEDICARE

## 2024-10-17 NOTE — TELEPHONE ENCOUNTER
Patient called and asked about lab orders for Monday. She wanted her sodium, thyroid, and  ordered. RN confirmed labs. Patient also wanted to check about moving her appointment on Monday due to a transportation issue.  not available so a message was sent and the patient was told to expect a call tomorrow.

## 2024-10-21 ENCOUNTER — OFFICE VISIT (OUTPATIENT)
Dept: GYNECOLOGIC ONCOLOGY | Facility: CLINIC | Age: 74
End: 2024-10-21
Payer: MEDICARE

## 2024-10-21 ENCOUNTER — LAB (OUTPATIENT)
Dept: LAB | Facility: HOSPITAL | Age: 74
End: 2024-10-21
Payer: MEDICARE

## 2024-10-21 VITALS
RESPIRATION RATE: 17 BRPM | TEMPERATURE: 97.5 F | HEIGHT: 64 IN | BODY MASS INDEX: 18.97 KG/M2 | HEART RATE: 80 BPM | WEIGHT: 111.1 LBS | DIASTOLIC BLOOD PRESSURE: 76 MMHG | OXYGEN SATURATION: 99 % | SYSTOLIC BLOOD PRESSURE: 142 MMHG

## 2024-10-21 DIAGNOSIS — H57.89: ICD-10-CM

## 2024-10-21 DIAGNOSIS — H04.123 DRY EYES: ICD-10-CM

## 2024-10-21 DIAGNOSIS — C54.1 ENDOMETRIAL CANCER: ICD-10-CM

## 2024-10-21 DIAGNOSIS — Z85.42 HISTORY OF ENDOMETRIAL CANCER: Primary | ICD-10-CM

## 2024-10-21 DIAGNOSIS — Z13.220 SCREENING FOR LIPID DISORDERS: ICD-10-CM

## 2024-10-21 DIAGNOSIS — E03.2 HYPOTHYROIDISM DUE TO MEDICATION: ICD-10-CM

## 2024-10-21 DIAGNOSIS — F32.A DEPRESSION, UNSPECIFIED DEPRESSION TYPE: ICD-10-CM

## 2024-10-21 DIAGNOSIS — E87.1 HYPONATREMIA: ICD-10-CM

## 2024-10-21 DIAGNOSIS — G47.9 TROUBLE IN SLEEPING: ICD-10-CM

## 2024-10-21 DIAGNOSIS — F41.1 ANXIETY, GENERALIZED: ICD-10-CM

## 2024-10-21 DIAGNOSIS — T45.1X5A: ICD-10-CM

## 2024-10-21 LAB
ANION GAP SERPL CALCULATED.3IONS-SCNC: 13 MMOL/L (ref 5–15)
BUN SERPL-MCNC: 18 MG/DL (ref 8–23)
BUN/CREAT SERPL: 36.7 (ref 7–25)
CALCIUM SPEC-SCNC: 9 MG/DL (ref 8.6–10.5)
CHLORIDE SERPL-SCNC: 96 MMOL/L (ref 98–107)
CHOLEST SERPL-MCNC: 221 MG/DL (ref 0–200)
CO2 SERPL-SCNC: 24 MMOL/L (ref 22–29)
CREAT SERPL-MCNC: 0.49 MG/DL (ref 0.57–1)
EGFRCR SERPLBLD CKD-EPI 2021: 99.7 ML/MIN/1.73
GLUCOSE SERPL-MCNC: 104 MG/DL (ref 65–99)
HDLC SERPL-MCNC: 79 MG/DL (ref 40–60)
LDLC SERPL CALC-MCNC: 121 MG/DL (ref 0–100)
LDLC/HDLC SERPL: 1.49 {RATIO}
POTASSIUM SERPL-SCNC: 4.2 MMOL/L (ref 3.5–5.2)
SODIUM SERPL-SCNC: 133 MMOL/L (ref 136–145)
T4 FREE SERPL-MCNC: 1.19 NG/DL (ref 0.92–1.68)
TRIGL SERPL-MCNC: 120 MG/DL (ref 0–150)
TSH SERPL DL<=0.05 MIU/L-ACNC: 4.65 UIU/ML (ref 0.27–4.2)
VLDLC SERPL-MCNC: 21 MG/DL (ref 5–40)

## 2024-10-21 PROCEDURE — 84439 ASSAY OF FREE THYROXINE: CPT

## 2024-10-21 PROCEDURE — 86038 ANTINUCLEAR ANTIBODIES: CPT

## 2024-10-21 PROCEDURE — 84443 ASSAY THYROID STIM HORMONE: CPT

## 2024-10-21 PROCEDURE — 80048 BASIC METABOLIC PNL TOTAL CA: CPT

## 2024-10-21 PROCEDURE — 36415 COLL VENOUS BLD VENIPUNCTURE: CPT

## 2024-10-21 PROCEDURE — 86304 IMMUNOASSAY TUMOR CA 125: CPT

## 2024-10-21 PROCEDURE — 80061 LIPID PANEL: CPT

## 2024-10-21 NOTE — PROGRESS NOTES
GYN ONCOLOGY CANCER SURVEILLANCE FOLLOW-UP    Cheryl Storm  [id number]  1950    Subjective   Chief Complaint: Follow up      History of present illness:   Cheryl Storm is a 74 y.o. year old female who is here today for ongoing surveillance of Endometrial Cancer, see Cancer History. She is also due to f/u on several other recent concerns, as outlined below.     Endometrial Cancer  -Completed CT chest, abdomen, and pelvis on 9/19/2024 showing no evidence of metastatic disease.   -CA-125 initially elevated, then improved to normal range after treatment. Signatara was also positive on 9-9-23. MTM/mL= 2.30. Recently her  level continues to steadily increase over the past 6 months from 32 --> 43 --> 50.6.  Due to repeat today.  -Overall, she continues to feel much improved. Still dealing with ongoing ophthalmic irritation which prohibits her from driving among other things that make independence difficult. She is still following closely with all of her eye specialists and notes that they would like to check labs for sjogern's syndrome. Pt req additional lab order be placed for when she has others collection after visit today.   -She denies vaginal bleeding, pelvic pain, changes in bowel or bladder function, new or concerning lesions, and breast problems.     Hyponatremia  -Repeat Na due today, pending lab collection.     Hypothyroid  -She continues levothyroxine 88 mcg once daily, with good toleration, and denies any omitted doses.  Symptoms are much improved.  -Her last TSH on 9/3/2024 was 3.870, due to repeat now    CV  -Emma inquired about a specific detail on the CT chest report stating calcified atherosclerotic disease was noted in the coronary arteries.  -Reports that a previous provider recommended she take a statin and she did for a while but then d/c'd. No lipid panel on file for review.   -No prev dx of HTN, but SBPs the last two visits have been >130. No personal or family hx of MI or CVA.  Former smoker.   -She requests we check a lipid panel as well. Not currently fasting.     Health Maintenance:  -Last colonoscopy by Dr Tailb Coronel 5/2013 with polyp pathology showing tubular adenoma. Rrevious referral placed at pt request and scheduled for 10/31 with Dr Coronel, cancelled.   -Last bilateral screening mmg 8-11-16, BR0. Left stable mmg. Right required additional imaging. A right diagnostic with u/s was completed on 8-31-16, BI-RADS 3 with recommendation to repeat a diagnostic right in 6 months.  -No previous DEXA bone density scan on file for review     Cancer History:   Oncology/Hematology History   Endometrial cancer   8/8/2023 Initial Diagnosis    Patient to Dr. Felicia Botello with c/o postmenopausal bleeding. TVUS showed thickened endometrial stripe. EMB performed. Pathology showed serous endometrial cancer.  Referred to Gyn Oncology.   (Evaluation delayed due to social and insurance issues)     9/29/2023 Cancer Staged    Staging form: Corpus Uteri - Carcinoma And Carcinosarcoma, AJCC 8th Edition  - Clinical stage from 9/29/2023: FIGO Stage I (cT1, cN0, cM0) - Signed by Latasha Farr MD on 9/29/2023     10/12/2023 Imaging    CT chest, abdomen, pelvis:  Irregular bilateral cystic adnexal findings are present, appearing somewhat solid and abnormally enhancing on the right measuring 4.3 cm and multilocular, with a stellate enhancing solid component on the left measuring 7.5 cm. Findings are suspicious for extension of or metastatic involvement from reported primary GYN neoplasm. Correlate with findings on reported upcoming salpingo-oophorectomy. Otherwise no suspicious findings for more distant metastatic involvement in the chest, abdomen and pelvis.     10/16/2023 Surgery    Robotic-assisted total laparoscopic hysterectomy, bilateral salpingo-oophorectomy, sentinel pelvic lymph node dissection, left ureterolysis, and omentectomy by Dr. Latasha Farr at [facility].    Surgical pathology showed  high-grade serous carcinoma with dedifferentiation (70% dedifferentiated) arising in an endometrial polyp. No myometrial invasion identified. Lymphvascular invasion present, low. Left ovary involved by serous carcinoma (100% serous at the ovary). Left pelvic sentinel node negative. Right pelvic positive for isolated tumor cells.   pT3a, pN0(i+), cM0    Molecular testing:  p53 amplified in both components  ER negative in dedifferentiated component, positive in serous (30%, 2+)  FL focally positive (5%, 2+)  Her2 negative in didifferentiated component, strongly positive (3+) in the serous compenent  PD-L1 <1  MSI intact     10/23/2023 Cancer Staged    Staging form: Corpus Uteri - Carcinoma And Carcinosarcoma, AJCC 8th Edition  - Pathologic stage from 10/23/2023: FIGO Stage IIIA (pT3a, pN0(i+), cM0) - Signed by Kathleen Fung APRN on 11/14/2023     11/3/2023 Genetic Testing    Patient has genetic testing done for personal history of endometrial cancer and family history of colorectal and pancreatic cancer.    Genetic testing via [employer] CancerNext 36-gene panel with [employer].  Ms. Storm's genetic test result was NEGATIVE for pathogenic mutations in all 36 - cancer risk genes analyzed.      11/17/2023 - 4/5/2024 Chemotherapy    OP ENDOMETRIAL Dostarlimab-gxly / CARBOplatin AUC=5 / PACLitaxel  - dostarlimab held at cycle #2 due to transaminitis  - cycle #3 delayed 1 week due to neutropenia  Dostarlimab not continued as a maintenance drug due to GI and ocular toxicity.     12/7/2023 Genetic Testing    Genetic testing NEGATIVE for pathogenic mutations in BRCA 1/2 and 34 other genes via CancerNext panel.      2/22/2024 -  Chemotherapy    OP SUPPORTIVE HYDRATION + ANTIEMETICS     4/22/2024 Imaging    CT chest, abdomen, and pelvis    Interval surgical resection of previously seen adnexal mass and uterus. No residual pelvic mass. No evidence of metastatic disease in the chest, abdomen, or pelvis.      8/7/2024  "Survivorship    Survivorship Care Plan completed and discussed with patient.  Copy of Survivorship Care Plan provided to patient and primary care provider.                   9/19/2024 Imaging    CT C/A/P    Impression:  1.No evidence of metastatic disease in the chest, abdomen, or pelvis     9/19/2024 Imaging    CT chest abdomen and pelvis  No evidence of metastatic disease            The current medication list and allergy list were reviewed and reconciled.     Past Medical History, Past Surgical History, Social History, Family History have been reviewed and are without significant changes except as mentioned.      Objective   Physical Exam  Vitals and nursing note reviewed.   Constitutional:       General: She is not in acute distress.     Appearance: Normal appearance. She is normal weight. She is not ill-appearing, toxic-appearing or diaphoretic.   HENT:      Head: Normocephalic and atraumatic.      Mouth/Throat:      Mouth: Mucous membranes are moist.      Pharynx: Oropharynx is clear.   Eyes:      Comments: Sunglasses on    Neck:      Thyroid: No thyromegaly.      Vascular: No JVD.   Pulmonary:      Effort: Pulmonary effort is normal. No respiratory distress.   Abdominal:      Palpations: Abdomen is soft.      Tenderness: There is no abdominal tenderness.   Musculoskeletal:      Cervical back: Neck supple.      Right lower leg: No edema.      Left lower leg: No edema.   Skin:     General: Skin is warm and dry.      Capillary Refill: Capillary refill takes less than 2 seconds.   Neurological:      Mental Status: She is alert and oriented to person, place, and time.      Motor: No weakness.      Gait: Gait normal.   Psychiatric:         Mood and Affect: Mood normal.         Behavior: Behavior normal.         Thought Content: Thought content normal.         Judgment: Judgment normal.       Vital Signs: /76   Pulse 80   Temp 97.5 °F (36.4 °C) (Temporal)   Resp 17   Ht 162.6 cm (64.02\")   Wt 50.4 kg (111 " lb 1.6 oz)   LMP  (LMP Unknown)   SpO2 99%   BMI 19.06 kg/m²   Vitals:    10/21/24 1401   PainSc: 0-No pain                                     ECOG score: 0             Result Review :  Last imaging study was 9-19-24.   Tumor marker:     Date Value Ref Range Status   10/21/2024 44.4 (H) 0.0 - 38.1 U/mL Final   09/03/2024 50.6 (H) 0.0 - 38.1 U/mL Final   08/07/2024 43.9 (H) 0.0 - 38.1 U/mL Final   04/22/2024 32.5 0.0 - 38.1 U/mL Final      Latest Reference Range & Units 09/03/24 11:36   Sodium 136 - 145 mmol/L 126 (L)   Potassium 3.5 - 5.2 mmol/L 4.9   Chloride 98 - 107 mmol/L 90 (L)   CO2 22.0 - 29.0 mmol/L 28.0   Anion Gap 5.0 - 15.0 mmol/L 8.0   BUN 8 - 23 mg/dL 14   Creatinine 0.57 - 1.00 mg/dL 0.69   BUN/Creatinine Ratio 7.0 - 25.0  20.3   eGFR >60.0 mL/min/1.73 91.8   Glucose 65 - 99 mg/dL 97   Calcium 8.6 - 10.5 mg/dL 9.2   Alkaline Phosphatase 39 - 117 U/L 66   Total Protein 6.0 - 8.5 g/dL 7.3   Albumin 3.5 - 5.2 g/dL 4.8   Globulin gm/dL 2.5   A/G Ratio g/dL 1.9   AST (SGOT) 1 - 32 U/L 24   ALT (SGPT) 1 - 33 U/L 16   Total Bilirubin 0.0 - 1.2 mg/dL 0.5   TSH Baseline 0.270 - 4.200 uIU/mL 3.870   WBC 3.40 - 10.80 10*3/mm3 6.24   RBC 3.77 - 5.28 10*6/mm3 3.79   Hemoglobin 12.0 - 15.9 g/dL 12.3   Hematocrit 34.0 - 46.6 % 34.7   Platelets 140 - 450 10*3/mm3 267   RDW 12.3 - 15.4 % 14.8   MCV 79.0 - 97.0 fL 91.6   MCH 26.6 - 33.0 pg 32.5   MCHC 31.5 - 35.7 g/dL 35.4   MPV 6.0 - 12.0 fL 8.1   RDW-SD 37.0 - 54.0 fl 50.3   Neutrophil Rel % 42.7 - 76.0 % 58.6   Lymphocyte Rel % 19.6 - 45.3 % 26.1   Monocyte Rel % 5.0 - 12.0 % 14.3 (H)   Eosinophil Rel % 0.3 - 6.2 % 0.3   Basophil Rel % 0.0 - 1.5 % 0.5   Immature Granulocyte Rel % 0.0 - 0.5 % 0.2   Neutrophils Absolute 1.70 - 7.00 10*3/mm3 3.66   Lymphocytes Absolute 0.70 - 3.10 10*3/mm3 1.63   Monocytes Absolute 0.10 - 0.90 10*3/mm3 0.89   Eosinophils Absolute 0.00 - 0.40 10*3/mm3 0.02   Basophils Absolute 0.00 - 0.20 10*3/mm3 0.03   Immature Grans,  Absolute 0.00 - 0.05 10*3/mm3 0.01    0.0 - 38.1 U/mL 50.6 (H)   (L): Data is abnormally low  (H): Data is abnormally high   Latest Reference Range & Units 09/04/24 14:50   Osmolality 275 - 295 mOsm/kg 269 (L)   Osmolality, URINE 300 - 1,100 mOsm/kg 274 (L)   Sodium, Urine mmol/L <20   (L): Data is abnormally low  PHQ-9 Total Score:      Procedure Note:            Assessment and Plan:       Diagnoses and all orders for this visit:    1. History of stage III endometrial cancer due to adnexal involvement (Primary)  -treatment completed in 4/2024, discontinued due to severe chemo/immunotherapy related toxicities.    -CT C/A/P 4-22-24 without disease progression and CA-125 initially improved to 32.5. Has increased since stopping treatment  -Imaging repeated due to steadily increasing tumor marker.  CT C/A/P on 9/19/2024 showing no evidence of metastatic disease.   -close f/u in 3-4 months, need repeat pelvic exam. She is understanding to contact the office for any gynecologic concerns before that time.    2. Dry eyes  3.  Immunosuppressive-induced eye inflammation  -follow up with eye specialists as scheduled. Dr Collier is glaucoma specialist. Dr Hudson Swanson is ophthalmologist.   -Per pt, eye specialist requested labs to rule out Sojourn's syndrome.  I feel this is reasonable given ongoing symptoms. orders placed.  -     DEMETRIUS With / DsDNA, RNP, Sjogrens A / B, Zavaleta; Future    4. Hypothyroidism due to medication  -Continue levothyroxine as prescribed (88mcg qd)  -Repeat TSH with reflex FT4    5. Hyponatremia  -Repeat BMP ordered    6. Anxiety, generalized  7. Depression, unspecified depression type  8. Trouble in sleeping  -Since last visit she has missed another appointment with Camila Hart on 9/23.  -Mood and difficulty sleeping overall continues to be much improved from a few months ago.  Since last visit patient per pharmacy contacted our office to request a 90-day refill of duloxetine as it is maintaining  stable control of symptoms.    -Continue duloxetine 60 mg daily and hydroxyzine as needed  -I would still prefer that she consult with Camila or other external PMHNP.  For now happy to continue treatment as is given stable control.    9. Screening for lipid disorders  -Lipid panel today per pt request. Of note, she is not fasting. Thyroid disease much better controlled now-- this could affect levels.   -Recommended she keep BP/heart rate log  -     Lipid Panel; Future      Pain assessment was performed today as a part of patient’s care.  For patients with pain related to surgery, gynecologic malignancy or cancer treatment, the plan is as noted in the assessment/plan.  For patients with pain not related to these issues, they are to seek any further needed care from a more appropriate provider, such as PCP.    Follow-up:     Return to clinic in ~3-4 months, pelvic exam      Electronically signed by CLAUDETTE Greenberg on 10/21/2024

## 2024-10-22 LAB — CANCER AG125 SERPL QL: 44.4 U/ML (ref 0–38.1)

## 2024-10-23 LAB — ANA SER QL: NEGATIVE

## 2024-10-27 ENCOUNTER — TELEPHONE (OUTPATIENT)
Dept: GYNECOLOGIC ONCOLOGY | Facility: CLINIC | Age: 74
End: 2024-10-27
Payer: MEDICARE

## 2024-10-27 NOTE — TELEPHONE ENCOUNTER
Please call Cheryl re: lab results from 10/21:    -CA-125 level is still above normal range.However, it is decreased compared to last month and overall remains stable since early August.     -TSH did flag above normal limits which she will see but it is only slightly outside of normal range. Free T4 WNL.  I do not recommend making any dose adjustment for this. Continue levothyroxine as prescribed, 88mcg qd. We will plan to repeat thyroid lab on return.     -Sodium still low but improved from last month. Heading the right direction now. Will continue monitoring.     -Lipid panel. Your total and LDL cholesterol levels were high. LDL is bad cholesterol, goal <100. I do recommend that you consider re-staring your statin medication. If interested, I will send Rx for rosuvastatin (Crestor) to pharmacy.  Purpose is to reduce cardiovascular risk and lower chances of having a heart attack or stroke down the line.  Your HDL cholesterol, or good cholesterol was appropriately elevated.  A value over 60 is protective of the heart and yours was 79.  Triglyceride level, or fat in the blood, within normal limits.  This is one I was worried would be abnormal since non fasting state when labs were collected. Perfect. Stay active.  Exercise as tolerated. Diet should be rich fresh fruits and vegetables, whole grains, and fish. Limit processed foods, red meat, and alcohol.  We will continue to monitor your blood pressure as well.  Preferably the top number be less than 130 and bottom number less than 80.  If your numbers continue to be consistently above goal, we can have a discussion about a blood pressure medication which will also improve cardiovascular risk.    -Lastly, I placed orders to check an autoimmune panel which included Sjogren's antibodies.  However due to the type of lab I chose it will first run a test called DEMETRIUS or antinuclear antibody.  If this lab does not flagged positive, it will not reflex to check the remainder of  the panel. Hers was negative. I did call and speak with the lab on Wednesday last week because it was the Sjögren's syndrome that we were specifically interested in.  They were unable to complete testing. With that being said, autoimmune disorders are very difficult to diagnose. One reason being you can have an autoimmune condition and still have a negative DEMETRIUS.  I agree with her eye specialist that she may potentially have Sjogren's syndrome, and this is worth investigating or ruling out.  Due to complexity of this specialty and testing I do recommend that she see a rheumatologist so the appropriate testing can be performed. When I previously worked at [facility] internal medicine there was and still is a rheumatologist who sees patients at that office every other Friday who I worked with and highly recommend. His name is Dr Ley. Ask if she is interested in seeing him to get testing for Sjogren and also any other potential autoimmune disorder contributing to ongoing symptoms.  If so I will place referral order.

## 2024-10-28 ENCOUNTER — TELEPHONE (OUTPATIENT)
Dept: GYNECOLOGIC ONCOLOGY | Facility: CLINIC | Age: 74
End: 2024-10-28
Payer: MEDICARE

## 2024-10-28 DIAGNOSIS — M35.0C SJOGREN SYNDROME WITH DENTAL INVOLVEMENT: ICD-10-CM

## 2024-10-28 DIAGNOSIS — Z85.42 HISTORY OF ENDOMETRIAL CANCER: Primary | ICD-10-CM

## 2024-10-28 NOTE — TELEPHONE ENCOUNTER
RN called patient and reported lab results. RN reviewed them with the patient and allowed opportunity for questions. RN also talked to patient about possible referral to rheumatology. Patient will be referred to rule out Sjogren's syndrome causing her dry mouth.   Patient asked to move her appointment up to January to discuss results of labs and to reevaluate. RN transferred call to  to make that adjustment.

## 2024-11-18 ENCOUNTER — HOSPITAL ENCOUNTER (OUTPATIENT)
Facility: HOSPITAL | Age: 74
Discharge: HOME | End: 2024-11-18
Admitting: NURSE PRACTITIONER
Payer: MEDICARE

## 2024-11-18 DIAGNOSIS — Z12.31 ENCOUNTER FOR SCREENING MAMMOGRAM FOR MALIGNANT NEOPLASM OF BREAST: ICD-10-CM

## 2024-11-18 PROCEDURE — 77067 SCR MAMMO BI INCL CAD: CPT

## 2024-11-18 PROCEDURE — 77063 BREAST TOMOSYNTHESIS BI: CPT

## 2025-01-09 ENCOUNTER — TELEPHONE (OUTPATIENT)
Dept: GYNECOLOGIC ONCOLOGY | Facility: CLINIC | Age: 75
End: 2025-01-09
Payer: MEDICARE

## 2025-01-09 DIAGNOSIS — E87.1 HYPONATREMIA: ICD-10-CM

## 2025-01-09 DIAGNOSIS — Z85.42 HISTORY OF ENDOMETRIAL CANCER: Primary | ICD-10-CM

## 2025-01-09 DIAGNOSIS — M35.0C SJOGREN SYNDROME WITH DENTAL INVOLVEMENT: ICD-10-CM

## 2025-01-09 DIAGNOSIS — E03.2 HYPOTHYROIDISM DUE TO MEDICATION: ICD-10-CM

## 2025-01-09 DIAGNOSIS — H04.123 DRY EYES: ICD-10-CM

## 2025-01-09 NOTE — TELEPHONE ENCOUNTER
RN accepted pt transfer call.  Pt requesting labs drawn prior to her appointment tomorrow.  Advised pt RN would speak with provider about labs needed and get orders placed.  Pt will be here at 0800 for labs.

## 2025-01-21 ENCOUNTER — OFFICE VISIT (OUTPATIENT)
Dept: GYNECOLOGIC ONCOLOGY | Facility: CLINIC | Age: 75
End: 2025-01-21
Payer: MEDICARE

## 2025-01-21 ENCOUNTER — LAB (OUTPATIENT)
Dept: LAB | Facility: HOSPITAL | Age: 75
End: 2025-01-21
Payer: MEDICARE

## 2025-01-21 VITALS
OXYGEN SATURATION: 98 % | BODY MASS INDEX: 19.31 KG/M2 | HEART RATE: 75 BPM | HEIGHT: 64 IN | WEIGHT: 113.1 LBS | SYSTOLIC BLOOD PRESSURE: 142 MMHG | TEMPERATURE: 97.3 F | RESPIRATION RATE: 17 BRPM | DIASTOLIC BLOOD PRESSURE: 79 MMHG

## 2025-01-21 DIAGNOSIS — E78.00 PURE HYPERCHOLESTEROLEMIA: ICD-10-CM

## 2025-01-21 DIAGNOSIS — Z00.00 HEALTHCARE MAINTENANCE: ICD-10-CM

## 2025-01-21 DIAGNOSIS — T45.1X5A: ICD-10-CM

## 2025-01-21 DIAGNOSIS — H04.123 DRY EYES: ICD-10-CM

## 2025-01-21 DIAGNOSIS — E03.2 HYPOTHYROIDISM DUE TO MEDICATION: ICD-10-CM

## 2025-01-21 DIAGNOSIS — H57.89: ICD-10-CM

## 2025-01-21 DIAGNOSIS — Z85.42 HISTORY OF ENDOMETRIAL CANCER: ICD-10-CM

## 2025-01-21 DIAGNOSIS — R03.0 ELEVATED BLOOD PRESSURE READING: ICD-10-CM

## 2025-01-21 DIAGNOSIS — E87.1 HYPONATREMIA: ICD-10-CM

## 2025-01-21 DIAGNOSIS — Z85.42 HISTORY OF ENDOMETRIAL CANCER: Primary | ICD-10-CM

## 2025-01-21 LAB
ALBUMIN SERPL-MCNC: 4.4 G/DL (ref 3.5–5.2)
ALBUMIN/GLOB SERPL: 1.7 G/DL
ALP SERPL-CCNC: 82 U/L (ref 39–117)
ALT SERPL W P-5'-P-CCNC: 17 U/L (ref 1–33)
ANION GAP SERPL CALCULATED.3IONS-SCNC: 9 MMOL/L (ref 5–15)
AST SERPL-CCNC: 20 U/L (ref 1–32)
BILIRUB SERPL-MCNC: 0.3 MG/DL (ref 0–1.2)
BUN SERPL-MCNC: 15 MG/DL (ref 8–23)
BUN/CREAT SERPL: 23.8 (ref 7–25)
CALCIUM SPEC-SCNC: 10 MG/DL (ref 8.6–10.5)
CANCER AG125 SERPL QL: 25.1 U/ML (ref 0–38.1)
CHLORIDE SERPL-SCNC: 97 MMOL/L (ref 98–107)
CHOLEST SERPL-MCNC: 204 MG/DL (ref 0–200)
CO2 SERPL-SCNC: 28 MMOL/L (ref 22–29)
CREAT SERPL-MCNC: 0.63 MG/DL (ref 0.57–1)
EGFRCR SERPLBLD CKD-EPI 2021: 93.8 ML/MIN/1.73
GLOBULIN UR ELPH-MCNC: 2.6 GM/DL
GLUCOSE SERPL-MCNC: 96 MG/DL (ref 65–99)
HDLC SERPL-MCNC: 90 MG/DL (ref 40–60)
LDLC SERPL CALC-MCNC: 102 MG/DL (ref 0–100)
LDLC/HDLC SERPL: 1.12 {RATIO}
POTASSIUM SERPL-SCNC: 4.8 MMOL/L (ref 3.5–5.2)
PROT SERPL-MCNC: 7 G/DL (ref 6–8.5)
SODIUM SERPL-SCNC: 134 MMOL/L (ref 136–145)
TRIGL SERPL-MCNC: 66 MG/DL (ref 0–150)
TSH SERPL DL<=0.05 MIU/L-ACNC: 2.44 UIU/ML (ref 0.27–4.2)
VLDLC SERPL-MCNC: 12 MG/DL (ref 5–40)

## 2025-01-21 PROCEDURE — 36415 COLL VENOUS BLD VENIPUNCTURE: CPT

## 2025-01-21 PROCEDURE — 86235 NUCLEAR ANTIGEN ANTIBODY: CPT

## 2025-01-21 PROCEDURE — 80061 LIPID PANEL: CPT

## 2025-01-21 PROCEDURE — 86304 IMMUNOASSAY TUMOR CA 125: CPT

## 2025-01-21 PROCEDURE — 84443 ASSAY THYROID STIM HORMONE: CPT

## 2025-01-21 PROCEDURE — 80053 COMPREHEN METABOLIC PANEL: CPT

## 2025-01-21 RX ORDER — LEVOTHYROXINE SODIUM 88 UG/1
88 TABLET ORAL
Qty: 90 TABLET | Refills: 1 | Status: SHIPPED | OUTPATIENT
Start: 2025-01-21

## 2025-01-21 RX ORDER — LEVOTHYROXINE SODIUM 88 UG/1
88 TABLET ORAL
COMMUNITY
End: 2025-01-21 | Stop reason: SDUPTHER

## 2025-01-21 NOTE — PROGRESS NOTES
GYN ONCOLOGY FOLLOW-UP    Cheryl Storm  [id number]  1950    Subjective   Chief Complaint: Follow up, h/o endometrial cancer     History of present illness:   Cheryl Storm is a 74 y.o. year old female who is here today for follow-up for the concerns outlined below. She is by herself for visit today. Drove herself to apt. Driving only short distances, otherwise still uncomfortable driving due to vision.  She had a very good holiday since last visit.  Daughter from [place] now living in [place].  They hosted a [place] party which will be a new annual tradition.    History of Endometrial Cancer  Dr Farr performed surgery in 10/2023.  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  No myometrial invasion identified.  Lymphvascular invasion present, low.  Left ovary involved by serous carcinoma (100% serous at the left ovary) left pelvic sentinel node negative.  Right pelvic node positive for isolated tumor cells.  Adjuvant chemotherapy + immunotherapy with carboplatin/paclitaxel/dostarlimab was started in 11/2023 for dedifferentiated/serous adenocarcinoma of the endometrium and stopped in 4/2024.  She completed 6 cycles of chemotherapy with carboplatin and paclitaxel. Treatment was complicated by transaminitis, diarrhea, and eye irritation. Dostarlimab was held with cycle #2 due to elevated AST and ALT >3x ULN but < 10x. Dostarlimab also held with cycles 5 & 6. Cycle #3 delayed 1 week due to neutropenia. Cycle 5 delayed due to neutropenia. She experienced significant diarrhea causing electrolyte derangements. Maintenance treatment with dostarlimab was previously planned but ultimately Dr. Farr recommended no further treatment due to ongoing severe chemo/immunotherapy related toxicities. Now now 9 months out since last treatment.    -genetic testing prev completed ([facility]) 11/2023 and negative for  pathogenic mutations in all 36 cancer risk genes analyzed.  -Completed CT chest, abdomen, and pelvis on 9/19/2024 showing no evidence of metastatic disease.   -CA-125 initially elevated, then improved to normal range after treatment. Fredrick was also positive on 9-9-23. MTM/mL= 2.30. Recently her  level continues to steadily increase over the past 6 months from 32 --> 43 --> 50.6.  Most recently stable at 44.4 on 10-21-24. Due to repeat today, collected.  -Ajay positive on 9-2--23, 2.30  -Overall, she continues to feel much improved. Still dealing with ongoing ophthalmic irritation which prohibits her from driving among other things that make independence difficult. She is still following closely with all of her eye specialists, last visit yesterday. Continues med mgmt/ drops per them.   -Per pt last visit her eye specialists req labs to check for sjogern's syndrome. DAMI with reflex to panel on abnormal was checked but dami negative.   -She denies vaginal bleeding, pelvic pain, changes in bowel or bladder function, new or concerning lesions, and breast problems.      Hyponatremia  -Still low but improved. Last sodium= 133 on 10-21-24. Repeat CMP collected today.  -serum osmolality= low @269 on 9-4-24  -urine osmolality= low @274 on 9-4-24  -urine sodium= WNL <20 on 9-4-24  -Significant relevant history: normal glucose, renal fx. Now off SSRI. Not on any diuretics or other potential med triggers. Denies vomiting/ diarrhea, no obvious si/sx dehydration. Denies alcohol use. No known pmh of CNS ds, pulmonary disease, HIV infection, heart failure, hepatic failure, or plasma cell dyscrasia.      Hypothyroid  -She continues levothyroxine 88 mcg once daily, with good toleration, and denies any omitted doses.  Symptoms are much improved.  -Her last TSH on 10/21/2024 was 4.650, slightly above upper limits of normal but still markedly improved compared to 46.780 in 8/2024. FT4 on 10/21 was WNL @1.19. Due to repeat  today     CV  -Last visit pt inquired about incidental finding on imaging of chest regarding calcified atherosclerotic disease was noted in the coronary arteries.  -At her req we checked a lipid panel (not fasting) and I recommended she keep a log of BP/HR readings for PCP as well as counseled on lifestyle/diet recommendations. No personal or family hx of MI or CVA. Former smoker.   -Offered statin (crestor) rx.   -Today she reports that in 10/2024 following results of abnormal lipid panel, she started simvastatin (from old Rx) Unsure dose. Reports compliance with this old Rx since that time.   -repeat lipid panel and LFTs collected today.     Ophthalmic irritation  -saw eye dr yesterday. Eye irritation is only complaint.  -prev recommended referral to rheum given complexity of workup of autoimmune conditions (see telephone note 10/28). She declined. Orders for Harvey's labs only placed today, collected.  -she is now scheduled to see Dr Berry Flannery ([facility]) on 4-15-25 for consultation.    Health Maintenance:  -PCP is Dr Choi but she tells me he only sees oncology pts now. She does believe he has APRN working with him in that office who could take over as her PCP.   -Last colonoscopy by Dr Talib Coronel 5/2013 with polyp pathology showing tubular adenoma. Previous referral placed at pt request and scheduled for 10/31 with Dr Coronel, cancelled. Last visit pt req different GI specialist. Referred to Dr Irizarry. Pending.   -Last bilateral screening mmg 11-, BIRADS1.   -No previous DEXA bone density scan on file for review. Scheduled 2-6-25      Also see note from 9-4-24 for additional details.     Oncology History:    Oncology/Hematology History   Endometrial cancer   8/8/2023 Initial Diagnosis    Patient to Dr. Felicia Botello with c/o postmenopausal bleeding. TVUS showed thickened endometrial stripe. EMB performed. Pathology showed serous endometrial cancer.  Referred to Gyn Oncology.   (Evaluation delayed due to  social and insurance issues)     9/29/2023 Cancer Staged    Staging form: Corpus Uteri - Carcinoma And Carcinosarcoma, AJCC 8th Edition  - Clinical stage from 9/29/2023: FIGO Stage I (cT1, cN0, cM0) - Signed by Latasha Farr MD on 9/29/2023     10/12/2023 Imaging    CT chest, abdomen, pelvis:  Irregular bilateral cystic adnexal findings are present, appearing somewhat solid and abnormally enhancing on the right measuring 4.3 cm and multilocular, with a stellate enhancing solid component on the left measuring 7.5 cm. Findings are suspicious for extension of or metastatic involvement from reported primary GYN neoplasm. Correlate with findings on reported upcoming salpingo-oophorectomy. Otherwise no suspicious findings for more distant metastatic involvement in the chest, abdomen and pelvis.     10/16/2023 Surgery    Robotic-assisted total laparoscopic hysterectomy, bilateral salpingo-oophorectomy, sentinel pelvic lymph node dissection, left ureterolysis, [facility]    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 No myometrial invasion identified. Lymphvascular invasion present, low. Left ovary involved by serous carcinoma (100% serous at the ovary). Left pelvic sentinel node negative. Right pelvic positive for isolated tumor cells.   pT3a, pN0(i+), cM0    Molecular testing:  p53 amplified in both components  ER negative in dedifferentiated component, positive in serous (30%, 2+)  GA focally positive (5%, 2+)  Her2 negative in didifferentiated component, strongly positive (3+) in the serous compenent  PD-L1 <1  MSI intact     10/23/2023 Cancer Staged    Staging form: Corpus Uteri - Carcinoma And Carcinosarcoma, AJCC 8th Edition  - Pathologic stage from 10/23/2023: FIGO Stage IIIA (pT3a, pN0(i+), cM0) - Signed by Kathleen Fung APRN on 11/14/2023     11/3/2023 Genetic Testing     Patient has genetic testing done for personal history of endometrial cancer and family history of colorectal and pancreatic cancer.    Genetic testing via [employer] CancerNext 36-gene panel with [employer].  Ms. Storm's genetic test result was NEGATIVE for pathogenic mutations in all 36 - cancer risk genes analyzed.      11/17/2023 - 4/5/2024 Chemotherapy    OP ENDOMETRIAL Dostarlimab-gxly / CARBOplatin AUC=5 / PACLitaxel  - dostarlimab held at cycle #2 due to transaminitis  - cycle #3 delayed 1 week due to neutropenia  Dostarlimab not continued as a maintenance drug due to GI and ocular toxicity.     12/7/2023 Genetic Testing    Genetic testing NEGATIVE for pathogenic mutations in BRCA 1/2 and 34 other genes via CancerNext panel.      2/22/2024 -  Chemotherapy    OP SUPPORTIVE HYDRATION + ANTIEMETICS     4/22/2024 Imaging    CT chest, abdomen, and pelvis    Interval surgical resection of previously seen adnexal mass and uterus. No residual pelvic mass. No evidence of metastatic disease in the chest, abdomen, or pelvis.      8/7/2024 Survivorship    Survivorship Care Plan completed and discussed with patient.  Copy of Survivorship Care Plan provided to patient and primary care provider.                   9/19/2024 Imaging    CT C/A/P    Impression:  1.No evidence of metastatic disease in the chest, abdomen, or pelvis     9/19/2024 Imaging    CT chest abdomen and pelvis  No evidence of metastatic disease            The current medication list and allergy list were reviewed and reconciled.     Past Medical History, Past Surgical History, Social History, Family History have been reviewed and are without significant changes except as mentioned.      Review of Systems   Constitutional: Negative.    Eyes:         +c/o eye irritation and dryness    Respiratory: Negative.     Cardiovascular: Negative.    Gastrointestinal: Negative.    Genitourinary: Negative.    Musculoskeletal: Negative.    Skin: Negative.   "  Psychiatric/Behavioral: Negative.         Objective   Physical Exam  Vital Signs: /79   Pulse 75   Temp 97.3 °F (36.3 °C) (Temporal)   Resp 17   Ht 162.6 cm (64.02\")   Wt 51.3 kg (113 lb 1.6 oz)   LMP  (LMP Unknown)   SpO2 98%   BMI 19.40 kg/m²   Vitals:    01/21/25 1027   PainSc: 0-No pain           General Appearance:  alert, cooperative, no apparent distress, appears stated age, and normal weight   Neurologic/Psych: A&O x 3, gait steady, appropriate affect   HEENT:  Normocephalic, without obvious abnormality, mucous membranes moist   Breasts:  deferred   Abdomen:   Soft, non-tender, non-distended, and no organomegaly   Lymph nodes: No cervical, supraclavicular, inguinal or axillary adenopathy noted   Extremities: Normal, atraumatic; no clubbing, cyanosis, or edema    Pelvic: External Genitalia  without lesions or skin changes  Vagina  is pink, moist, without lesions.  and shortened vaginal vault.  Vaginal Cuff  Female Vaginal Cuff: smooth, intact, and without visible lesions  Uterus  surgically absent and no palpable masses  Ovaries  surgically absent bilaterally and without palpable masses or fullness  Rectovaginal  Female rectovaginal: deferred     ECOG score: 0               Result Review :      Last imaging study was 9-19-24.     Tumor marker:     Date Value Ref Range Status   01/21/2025 25.1 0.0 - 38.1 U/mL Final   10/21/2024 44.4 (H) 0.0 - 38.1 U/mL Final   09/03/2024 50.6 (H) 0.0 - 38.1 U/mL Final   08/07/2024 43.9 (H) 0.0 - 38.1 U/mL Final       Procedure Notes:              Assessment and Plan:     This is a 73yo lady with h/o stage III endometrial cancer due to adnexal involvement who presents today for surveillance.     Diagnoses and all orders for this visit:    1. History of endometrial cancer (Primary)  -CA-125 today again improved from prior and now in normal range at 25.1. This will be a good marker in the setting of serous endometrial cancer with prev elevation. Repeat on " return  -Repeat imaging PRN for symptoms or exam findings suspicious for recurrence  -Reviewed surveillance recommendations of physical exam including pelvic and surveillance.  -She is understanding to call with any changes in pelvic symptoms or general GYN concerns at any time between regularly scheduled visits.    -     ; Future    2. Hypothyroidism due to medication  -TSH today WNL at 2.440.   -Continue levothyroxine dose of 88mcg qd.  6-month supply of medication refill sent to preferred pharmacy.  -I recommend repeat labs checked in 6-months. This can be taken over by new PCP  -     TSH Rfx On Abnormal To Free T4; Future  -     levothyroxine (SYNTHROID, LEVOTHROID) 88 MCG tablet; Take 1 tablet by mouth Every Morning.  Dispense: 90 tablet; Refill: 1    3. Dry eyes  4. Immunosuppressive-induced eye inflammation  -f/u with all eye specialists as scheduled. Continue med mgmt per their recommendation.   -Sjogren's lab in process. She is aware this may take a couple days to result.   -I prev discussed her seeing a specific provider I know about w/u and referral. She does not yet have an official rheum dx. Upon final results of these labs I will update referral communication accordingly.     -     Sjogren's Antibody, Anti-SS-A / -SS-B; Future    5. Pure hypercholesterolemia  6. Elevated BP reading  -Lipid panel today still in process  -I asked patient to send via OneTouch message when she gets home confirming simvastatin is the medication and what dose she has been taking.  -Repeat CMP today again showing normal liver function.  This is great LFTs remain stable considering she restarted statin therapy ~3-4 months ago.   -This can be taken over by new PCP. Again counseled and reminded pt to begin keeping log of bp and hr readings. Take this log as well as her home auto cuff to first apt.   -     Comprehensive Metabolic Panel; Future  -     Lipid Panel; Future    7. Hyponatremia  -Na today again improved and almost  up to normal range= 134.  -monitor and further eval by PCP moving forward     8. Healthcare maintenance  -Discussion with patient today about visits here will be focused around her cancer and for safety reasons will defer unrelated concerns to PCP or specialists, as appropriate.  She is not routinely following with Dr. Choi but does feel that another provider in his practice will be able to take over as her primary care provider.  I asked Cheryl to let me know if this is not the case and I will be happy to refer her to internal medicine and find someone at [facility] who she can establish with.  -Proceed with DEXA next month as scheduled.  -Mammogram due 11/2025  -I will touch base with our referral coordinator regarding GI/colonoscopy referral.  Will touch base with patient with update.    Pain assessment was performed today as a part of patient’s care.  For patients with pain related to surgery, gynecologic malignancy or cancer treatment, the plan is as noted in the assessment/plan.  For patients with pain not related to these issues, they are to seek any further needed care from a more appropriate provider, such as PCP.      Follow-up:     Return to clinic in 4 months for ongoing cancer surveillance.  -Pt wants to have alfreda and CA-125 drawn beforehand.  -pelvic exam    Electronically signed by CLAUDETTE Greenberg on 01/21/2025

## 2025-01-22 DIAGNOSIS — Z85.42 HISTORY OF ENDOMETRIAL CANCER: Primary | ICD-10-CM

## 2025-01-22 LAB
ENA SS-A AB SER-ACNC: <0.2 AI (ref 0–0.9)
ENA SS-B AB SER-ACNC: <0.2 AI (ref 0–0.9)

## 2025-01-23 ENCOUNTER — TELEPHONE (OUTPATIENT)
Dept: GYNECOLOGIC ONCOLOGY | Facility: CLINIC | Age: 75
End: 2025-01-23
Payer: MEDICARE

## 2025-01-23 NOTE — TELEPHONE ENCOUNTER
Patient notified of Providers comments and recommendations for Lab results with verbalized understanding and offers thank you.

## 2025-01-23 NOTE — TELEPHONE ENCOUNTER
----- Message from Megan Schwartz sent at 1/22/2025  6:20 PM EST -----  These labs are negative. Still recommend f/u with rheum as scheduled.

## 2025-01-30 DIAGNOSIS — Z12.11 SCREENING FOR COLON CANCER: Primary | ICD-10-CM

## 2025-04-16 DIAGNOSIS — N95.2 VAGINAL ATROPHY: Primary | ICD-10-CM

## 2025-05-19 ENCOUNTER — LAB (OUTPATIENT)
Dept: LAB | Facility: HOSPITAL | Age: 75
End: 2025-05-19
Payer: MEDICARE

## 2025-05-19 DIAGNOSIS — Z85.42 HISTORY OF ENDOMETRIAL CANCER: ICD-10-CM

## 2025-05-19 LAB — CANCER AG125 SERPL QL: 22.6 U/ML (ref 0–38.1)

## 2025-05-19 PROCEDURE — 86304 IMMUNOASSAY TUMOR CA 125: CPT

## 2025-05-19 PROCEDURE — 36415 COLL VENOUS BLD VENIPUNCTURE: CPT

## 2025-07-02 ENCOUNTER — HOSPITAL ENCOUNTER (OUTPATIENT)
Dept: BONE DENSITY | Facility: HOSPITAL | Age: 75
Discharge: HOME | End: 2025-07-02
Admitting: NURSE PRACTITIONER
Payer: MEDICARE

## 2025-07-02 DIAGNOSIS — Z78.0 POST-MENOPAUSAL: ICD-10-CM

## 2025-07-02 PROCEDURE — 77080 DXA BONE DENSITY AXIAL: CPT

## 2025-08-12 DIAGNOSIS — E03.2 HYPOTHYROIDISM DUE TO MEDICATION: Primary | ICD-10-CM

## (undated) DEVICE — MANIP UTER RUMI TP 5.1MM 3.75CM

## (undated) DEVICE — ENDOPATH PNEUMONEEDLE INSUFFLATION NEEDLES WITH LUER LOCK CONNECTORS 150MM: Brand: ENDOPATH

## (undated) DEVICE — SCRB SURG BACTOSHIELD CHG 4PCT 4OZ

## (undated) DEVICE — ADHS SKIN PREMIERPRO EXOFIN TOPICAL HI/VISC .5ML

## (undated) DEVICE — BOWL UTIL STRL 32OZ

## (undated) DEVICE — UNDERGLV SURG BIOGEL INDICAT PF 61/2 GRN

## (undated) DEVICE — COLUMN DRAPE

## (undated) DEVICE — MANIP UTER RUMI 2 KOH EFFICIENT 2.5CM BL

## (undated) DEVICE — TIP COVER ACCESSORY

## (undated) DEVICE — ANTIBACTERIAL UNDYED BRAIDED (POLYGLACTIN 910), SYNTHETIC ABSORBABLE SUTURE: Brand: COATED VICRYL

## (undated) DEVICE — BLANKT WARM UPPR/BDY ARM/OUT 57X196CM

## (undated) DEVICE — SYR LUERLOK 50ML

## (undated) DEVICE — ARM DRAPE

## (undated) DEVICE — APPL CHLORAPREP TINTED 26ML TEAL

## (undated) DEVICE — PAD ARMBRD SURG CONVOL 7.5X20X2IN

## (undated) DEVICE — BNDR ABD PREMIUM/UNIV 10IN 27TO48IN

## (undated) DEVICE — TRENDELENBURG WINGPAD POSITIONING KIT DELUXE - WITHOUT BODY STRAP: Brand: SOULE MEDICAL

## (undated) DEVICE — PATIENT RETURN ELECTRODE, SINGLE-USE, CONTACT QUALITY MONITORING, ADULT, WITH 9FT CORD, FOR PATIENTS WEIGING OVER 33LBS. (15KG): Brand: MEGADYNE

## (undated) DEVICE — MANIP UTER RUMI TP 6.7MMX8CM BLU

## (undated) DEVICE — SHEET, DRAPE, SPLIT, STERILE: Brand: MEDLINE

## (undated) DEVICE — SUT MNCRYL PLS ANTIB UD 3/0 PS2 27IN

## (undated) DEVICE — PK MAJ GYN DAVINCI 10

## (undated) DEVICE — GLV SURG SENSICARE PI MIC PF SZ6 LF STRL

## (undated) DEVICE — OCCL COLPO PNEUMO  STRL

## (undated) DEVICE — ANTIBACTERIAL UNDYED BRAIDED (POLYGLACTIN 910), SYNTHETIC ABSORBABLE SURGICAL SUTURE: Brand: COATED VICRYL

## (undated) DEVICE — NDL BLNT 18G 1 1/2IN

## (undated) DEVICE — BLADELESS OBTURATOR: Brand: WECK VISTA

## (undated) DEVICE — ST TBG CONN PNEUMOCLEAR EVAC SMOKE HEAT/HUMID

## (undated) DEVICE — TISSUE RETRIEVAL SYSTEM: Brand: INZII RETRIEVAL SYSTEM

## (undated) DEVICE — CANNULA SEAL